# Patient Record
Sex: FEMALE | Race: WHITE | NOT HISPANIC OR LATINO | URBAN - METROPOLITAN AREA
[De-identification: names, ages, dates, MRNs, and addresses within clinical notes are randomized per-mention and may not be internally consistent; named-entity substitution may affect disease eponyms.]

---

## 2017-12-10 ENCOUNTER — EMERGENCY (EMERGENCY)
Facility: HOSPITAL | Age: 72
LOS: 1 days | Discharge: HOME | End: 2017-12-10
Attending: EMERGENCY MEDICINE | Admitting: EMERGENCY MEDICINE
Payer: MEDICARE

## 2017-12-10 VITALS
DIASTOLIC BLOOD PRESSURE: 51 MMHG | TEMPERATURE: 99 F | OXYGEN SATURATION: 100 % | RESPIRATION RATE: 14 BRPM | HEART RATE: 55 BPM | SYSTOLIC BLOOD PRESSURE: 109 MMHG

## 2017-12-10 VITALS
OXYGEN SATURATION: 100 % | RESPIRATION RATE: 16 BRPM | TEMPERATURE: 98 F | HEART RATE: 70 BPM | DIASTOLIC BLOOD PRESSURE: 77 MMHG | SYSTOLIC BLOOD PRESSURE: 128 MMHG

## 2017-12-10 LAB
HCT VFR BLD CALC: 36.4 % — SIGNIFICANT CHANGE UP (ref 34.5–45)
HGB BLD-MCNC: 12.3 G/DL — SIGNIFICANT CHANGE UP (ref 11.5–15.5)
MCHC RBC-ENTMCNC: 29.9 PG — SIGNIFICANT CHANGE UP (ref 27–34)
MCHC RBC-ENTMCNC: 33.8 % — SIGNIFICANT CHANGE UP (ref 32–36)
MCV RBC AUTO: 88.3 FL — SIGNIFICANT CHANGE UP (ref 80–100)
NRBC # FLD: 0 — SIGNIFICANT CHANGE UP
PLATELET # BLD AUTO: 166 K/UL — SIGNIFICANT CHANGE UP (ref 150–400)
PMV BLD: 11.3 FL — SIGNIFICANT CHANGE UP (ref 7–13)
RBC # BLD: 4.12 M/UL — SIGNIFICANT CHANGE UP (ref 3.8–5.2)
RBC # FLD: 14 % — SIGNIFICANT CHANGE UP (ref 10.3–14.5)
WBC # BLD: 8.5 K/UL — SIGNIFICANT CHANGE UP (ref 3.8–10.5)
WBC # FLD AUTO: 8.5 K/UL — SIGNIFICANT CHANGE UP (ref 3.8–10.5)

## 2017-12-10 PROCEDURE — 12002 RPR S/N/AX/GEN/TRNK2.6-7.5CM: CPT

## 2017-12-10 PROCEDURE — 70450 CT HEAD/BRAIN W/O DYE: CPT | Mod: 26

## 2017-12-10 PROCEDURE — 99284 EMERGENCY DEPT VISIT MOD MDM: CPT | Mod: 25,GC

## 2017-12-10 RX ORDER — ACETAMINOPHEN 500 MG
650 TABLET ORAL ONCE
Qty: 0 | Refills: 0 | Status: COMPLETED | OUTPATIENT
Start: 2017-12-10 | End: 2017-12-10

## 2017-12-10 RX ORDER — TETANUS TOXOID, REDUCED DIPHTHERIA TOXOID AND ACELLULAR PERTUSSIS VACCINE, ADSORBED 5; 2.5; 8; 8; 2.5 [IU]/.5ML; [IU]/.5ML; UG/.5ML; UG/.5ML; UG/.5ML
0.5 SUSPENSION INTRAMUSCULAR ONCE
Qty: 0 | Refills: 0 | Status: COMPLETED | OUTPATIENT
Start: 2017-12-10 | End: 2017-12-10

## 2017-12-10 RX ADMIN — TETANUS TOXOID, REDUCED DIPHTHERIA TOXOID AND ACELLULAR PERTUSSIS VACCINE, ADSORBED 0.5 MILLILITER(S): 5; 2.5; 8; 8; 2.5 SUSPENSION INTRAMUSCULAR at 05:34

## 2017-12-10 RX ADMIN — Medication 650 MILLIGRAM(S): at 04:45

## 2017-12-10 NOTE — ED PROVIDER NOTE - OBJECTIVE STATEMENT
71 YOF pmh HTN presents to ED s/p head injury pt woke up after her house alarm went off 71 YOF pmh HTN presents to ED s/p head injury pt woke up after her house alarm went off pt jumped out of bed and hit her head on the side table causing a laceration, no LOC, no headache, no vision changes. Pt is on aspirin daily 81mg. Pt states she continued to bleed after the laceration was able to sleep but woke up in a puddle of blood that soaked through her clothes. Pt applied pressure to head bleeding did not stop. Pt denies any lightheadedness or SOB. Pt denies any confusion.

## 2017-12-10 NOTE — ED PROVIDER NOTE - PMH
Acid Reflux    Dyslipidemia    HTN (Hypertension)    Lumbar Spinal Stenosis    Obesity    Ovarian Cyst

## 2017-12-10 NOTE — ED PROVIDER NOTE - PROGRESS NOTE DETAILS
Laceration repaired, no longer bleeding. Pt feels better, denies any headache. CTH is negative, Hgb normal will d/c home.

## 2017-12-10 NOTE — ED ADULT TRIAGE NOTE - CHIEF COMPLAINT QUOTE
pt got out of bed and fell, hit her head on the night stand around 1am. pt has a laceration to the left side of her head. dried blood noted, no active bleeding at this time. pt states she got startled when the alarm went off. (-)LOC. denies any pain, states "its throbbing". pt on takes 1 baby aspirin daily. PMH- HTN, HLD, GERD. pt got out of bed and fell, hit her head on the night stand around 1am. pt has a laceration to the left side of her head. pt states wound was continuously bleeding. dried blood noted, no active bleeding at this time. pt states she got startled when the alarm went off. (-)LOC. denies any pain, states "its throbbing". pt on takes 1 baby aspirin daily. PMH- HTN, HLD, GERD.

## 2017-12-10 NOTE — ED PROVIDER NOTE - MEDICAL DECISION MAKING DETAILS
Scalp laceration with head injury. Pt on aspirin, will get CTH for ICH. Cbc to check hemoglobin due to about of blood described by patient. Pt is not symptomatic low concern for massive hemorrhage. Will repair laceration, no hematoma under the bleed, laceration was superficial linear well alined.

## 2017-12-10 NOTE — ED ADULT NURSE NOTE - OBJECTIVE STATEMENT
Received on stretcher in 26 with  at bedside. Awake, A&Ox3, ambulatory without assistance, presents with blood in hair and open lac on left side of head approx. 3 cm in length, respirations even and unlabored on room air. 72 YO female h/o HTN c/o fall. States she awoke in the middle of night to home alarm sounding, fell into table and hit her head. Denies dizziness/lightheadedness before fall, LOC, HA, changes in vision. Lac is not actively bleeding. Comfort and safety measures provided. Call bell within reach.

## 2017-12-10 NOTE — ED ADULT NURSE NOTE - CHIEF COMPLAINT QUOTE
pt got out of bed and fell, hit her head on the night stand around 1am. pt has a laceration to the left side of her head. pt states wound was continuously bleeding. dried blood noted, no active bleeding at this time. pt states she got startled when the alarm went off. (-)LOC. denies any pain, states "its throbbing". pt on takes 1 baby aspirin daily. PMH- HTN, HLD, GERD.

## 2017-12-10 NOTE — ED PROVIDER NOTE - NS ED ROS FT
CONSTITUTIONAL: No fevers, no chills  Eyes: no visual changes  Ears: no ear drainage, no ear pain  Nose: no nasal congestion  Mouth/Throat: no sore throat  Cardiovascular: No Chest pain  Respiratory: No SOB  Gastrointestinal: No n/v/d, no abd pain  Genitourinary: no dysuria, no hematuria  SKIN: no rashes, +laceration to scalp.   NEURO: no headache  PSYCHIATRIC: no known mental health issues.

## 2018-01-19 ENCOUNTER — APPOINTMENT (OUTPATIENT)
Dept: ORTHOPEDIC SURGERY | Facility: CLINIC | Age: 73
End: 2018-01-19

## 2018-02-16 ENCOUNTER — APPOINTMENT (OUTPATIENT)
Dept: ORTHOPEDIC SURGERY | Facility: CLINIC | Age: 73
End: 2018-02-16
Payer: MEDICARE

## 2018-02-16 DIAGNOSIS — M48.061 SPINAL STENOSIS, LUMBAR REGION WITHOUT NEUROGENIC CLAUDICATION: ICD-10-CM

## 2018-02-16 PROCEDURE — 72170 X-RAY EXAM OF PELVIS: CPT | Mod: 59

## 2018-02-16 PROCEDURE — 72110 X-RAY EXAM L-2 SPINE 4/>VWS: CPT

## 2018-02-16 PROCEDURE — 99204 OFFICE O/P NEW MOD 45 MIN: CPT

## 2018-02-16 NOTE — PHYSICAL EXAM
[Poor Appearance] : well-appearing [Acute Distress] : not in acute distress [Obese] : not obese [Abl Mood] : in a normal mood [Abl Affect] : with normal affect [Poor Coordination] : normal coordination [Disorientation] : oriented x 3 [SLR] : negative straight leg raise [Plantar Reflex Right Only] : absent on the right [Plantar Reflex Left Only] : absent on the left [DTR Reflexes Clonus Of Right Ankle (___ Beats)] : absent on the right [DTR Reflexes Clonus Of Left Ankle (___ Beats)] : absent on the left [FreeTextEntry2] : The pt is awake, alert and oriented to self, place and time, is comfortable and in no acute distress. Inspection of neck, back and lower extremities bilaterally reveals no rashes or ecchymotic lesions.  There is no obvious abnormal spinal curvature in the sagittal and coronal planes. There is no tenderness over the cervical, thoracic or lumbar spine, or the paraspinal or upper and lower extremities musculature. There is no sacroiliac tenderness. No greater trochanteric tenderness bilaterally. No atrophy or abnormal movements noted in the upper or lower extremities. There is no swelling noted in the upper or lower extremities bilaterally. No cervical lymphadenopathy noted anteriorly. No joint laxity noted in the upper and lower extremity joints bilaterally.\par  Hip range of motion is degrees internal rotation 30° external rotation without pain. Full range of motion of the shoulders bilaterally with no significant pain\par Negative straight leg raise to 45° in the sitting position bilaterally. There is no groin pain with hip internal rotation and a negative RJ test bilaterally.  [de-identified] : ext 30 degrees with pain, flexion to her mid tibia with less pain [de-identified] : seen with her  [de-identified] : AP pelvis demonstrates mild pubic symphysis degeneration. No obvious acute fractures. Mild sacroiliac degeneration right more than left.\par \par 4 views lumbar spine demonstrate 16° right-sided scoliosis from L1-L4. Asymmetric degeneration along the concavity 4 and 45. On the lateral projection severe degeneration seen at L4-5 L5-S1 L3-4 and L2-3. Diffuse osteopenia noted. No obvious acute fractures. Superior endplate  compression is noted at L2 suspicious of a acute compression fracture. No dynamic instability between flexion and extension

## 2018-02-16 NOTE — DISCUSSION/SUMMARY
[Medication Risks Reviewed] : Medication risks reviewed [de-identified] : The patient has significant osteopenia on x-rays today. I recommended that she proceed with evaluation of osteoporosis and a referral to endocrinologist was provided for evaluation and management of osteoporosis if any. Also recommended that we obtain an MRI lumbar spine to better assess the likely acute compression deformity of L2 vertebral body. I also recommended use of a lumbar corset which was prescribed for her today. I will see her back after the MRI has been performed to discuss further treatment options. Prescribe her tramadol for symptomatic relief. Recommended use of Tylenol as well\par \par [place]  (Presript[place] Monitoring Program) registry reviewed for this patient

## 2018-02-16 NOTE — CONSULT LETTER
[Dear  ___] : Dear  [unfilled], [I had the pleasure of evaluating your patient, [unfilled].] : I had the pleasure of evaluating your patient, [unfilled]. [FreeTextEntry2] : Kavin Meli [FreeTextEntry1] : Thank you for this referral. I have enclosed my note for your review. Please feel free to contact my office if you have additional questions regarding this patient.\par \par Regards,\par Erich Campoverde MD, FACS, FAAOS\par \par  of Orthopaedic Surgery\par [employer] [facility] [employer]\par Spinal Reconstruction Surgery\par Minimally Invasive Spinal Surgery\par [facility]

## 2018-02-16 NOTE — HISTORY OF PRESENT ILLNESS
[Pain] : pain [Worsening] : worsening [___ wks] : [unfilled] week(s) ago [6] : currently ~his/her~ pain is 6 out of 10 [Bending] : worsened by bending [Lifting] : worsened by lifting [Prolonged Sitting] : worsened by prolonged sitting [Walking] : worsened by walking [NSAIDs] : relieved by nonsteroidal anti-inflammatory drugs [All Other ROS Normal] : All other review of systems are negative except as noted [All Hx] : past medical, family, and social [All] : medication and allergy [FreeTextEntry1] : Severe low back pain and right buttock pain for a week [FreeTextEntry2] : The patient today presents for evaluation of severe low back and right buttock pain for a week. She was moving a sewing machine downstairs weeks ago when she started noticing severe low back pain into right buttock. [de-identified] : laying down

## 2018-02-17 ENCOUNTER — OUTPATIENT (OUTPATIENT)
Dept: OUTPATIENT SERVICES | Facility: HOSPITAL | Age: 73
LOS: 1 days | End: 2018-02-17
Payer: MEDICARE

## 2018-02-17 ENCOUNTER — APPOINTMENT (OUTPATIENT)
Dept: MRI IMAGING | Facility: CLINIC | Age: 73
End: 2018-02-17
Payer: MEDICARE

## 2018-02-17 DIAGNOSIS — M80.00XA AGE-RELATED OSTEOPOROSIS WITH CURRENT PATHOLOGICAL FRACTURE, UNSPECIFIED SITE, INITIAL ENCOUNTER FOR FRACTURE: ICD-10-CM

## 2018-02-17 PROCEDURE — 72148 MRI LUMBAR SPINE W/O DYE: CPT

## 2018-02-17 PROCEDURE — 72148 MRI LUMBAR SPINE W/O DYE: CPT | Mod: 26

## 2018-03-05 ENCOUNTER — APPOINTMENT (OUTPATIENT)
Dept: ORTHOPEDIC SURGERY | Facility: CLINIC | Age: 73
End: 2018-03-05
Payer: MEDICARE

## 2018-03-05 VITALS
BODY MASS INDEX: 25.4 KG/M2 | SYSTOLIC BLOOD PRESSURE: 128 MMHG | HEIGHT: 58 IN | HEART RATE: 70 BPM | WEIGHT: 121 LBS | DIASTOLIC BLOOD PRESSURE: 77 MMHG

## 2018-03-05 PROCEDURE — 72100 X-RAY EXAM L-S SPINE 2/3 VWS: CPT

## 2018-03-05 PROCEDURE — 99214 OFFICE O/P EST MOD 30 MIN: CPT

## 2018-04-18 ENCOUNTER — APPOINTMENT (OUTPATIENT)
Dept: ORTHOPEDIC SURGERY | Facility: CLINIC | Age: 73
End: 2018-04-18
Payer: MEDICARE

## 2018-04-18 VITALS
BODY MASS INDEX: 25.4 KG/M2 | HEIGHT: 58 IN | DIASTOLIC BLOOD PRESSURE: 78 MMHG | WEIGHT: 121 LBS | HEART RATE: 73 BPM | SYSTOLIC BLOOD PRESSURE: 123 MMHG

## 2018-04-18 DIAGNOSIS — M84.48XA PATHOLOGICAL FRACTURE, OTHER SITE, INITIAL ENCOUNTER FOR FRACTURE: ICD-10-CM

## 2018-04-18 PROCEDURE — 72100 X-RAY EXAM L-S SPINE 2/3 VWS: CPT

## 2018-04-18 PROCEDURE — 99214 OFFICE O/P EST MOD 30 MIN: CPT

## 2018-05-23 ENCOUNTER — APPOINTMENT (OUTPATIENT)
Dept: ORTHOPEDIC SURGERY | Facility: CLINIC | Age: 73
End: 2018-05-23
Payer: MEDICARE

## 2018-05-23 VITALS
DIASTOLIC BLOOD PRESSURE: 76 MMHG | BODY MASS INDEX: 25.4 KG/M2 | WEIGHT: 121 LBS | SYSTOLIC BLOOD PRESSURE: 125 MMHG | HEART RATE: 67 BPM | HEIGHT: 58 IN

## 2018-05-23 PROCEDURE — 72100 X-RAY EXAM L-S SPINE 2/3 VWS: CPT

## 2018-05-23 PROCEDURE — 99214 OFFICE O/P EST MOD 30 MIN: CPT

## 2018-05-24 ENCOUNTER — APPOINTMENT (OUTPATIENT)
Dept: OTOLARYNGOLOGY | Facility: CLINIC | Age: 73
End: 2018-05-24
Payer: MEDICARE

## 2018-05-24 VITALS
WEIGHT: 121 LBS | HEART RATE: 62 BPM | DIASTOLIC BLOOD PRESSURE: 63 MMHG | BODY MASS INDEX: 25.4 KG/M2 | HEIGHT: 58 IN | SYSTOLIC BLOOD PRESSURE: 107 MMHG

## 2018-05-24 DIAGNOSIS — J34.89 OTHER SPECIFIED DISORDERS OF NOSE AND NASAL SINUSES: ICD-10-CM

## 2018-05-24 DIAGNOSIS — R09.81 NASAL CONGESTION: ICD-10-CM

## 2018-05-24 PROCEDURE — 99204 OFFICE O/P NEW MOD 45 MIN: CPT | Mod: 25

## 2018-05-24 PROCEDURE — 31231 NASAL ENDOSCOPY DX: CPT

## 2018-05-25 ENCOUNTER — RX RENEWAL (OUTPATIENT)
Age: 73
End: 2018-05-25

## 2018-06-10 ENCOUNTER — TRANSCRIPTION ENCOUNTER (OUTPATIENT)
Age: 73
End: 2018-06-10

## 2018-07-03 ENCOUNTER — RECORD ABSTRACTING (OUTPATIENT)
Age: 73
End: 2018-07-03

## 2018-07-03 DIAGNOSIS — J84.112 IDIOPATHIC PULMONARY FIBROSIS: ICD-10-CM

## 2018-07-03 DIAGNOSIS — I73.00 RAYNAUD'S SYNDROME W/OUT GANGRENE: ICD-10-CM

## 2018-07-07 ENCOUNTER — RECORD ABSTRACTING (OUTPATIENT)
Age: 73
End: 2018-07-07

## 2018-07-23 ENCOUNTER — APPOINTMENT (OUTPATIENT)
Dept: PULMONOLOGY | Facility: CLINIC | Age: 73
End: 2018-07-23
Payer: MEDICARE

## 2018-07-23 VITALS
OXYGEN SATURATION: 100 % | HEIGHT: 58 IN | SYSTOLIC BLOOD PRESSURE: 95 MMHG | DIASTOLIC BLOOD PRESSURE: 61 MMHG | WEIGHT: 121 LBS | HEART RATE: 70 BPM | BODY MASS INDEX: 25.4 KG/M2 | TEMPERATURE: 98.5 F | RESPIRATION RATE: 14 BRPM

## 2018-07-23 DIAGNOSIS — Z87.09 PERSONAL HISTORY OF OTHER DISEASES OF THE RESPIRATORY SYSTEM: ICD-10-CM

## 2018-07-23 PROCEDURE — 94060 EVALUATION OF WHEEZING: CPT

## 2018-07-23 PROCEDURE — 99213 OFFICE O/P EST LOW 20 MIN: CPT | Mod: 25

## 2018-07-23 PROCEDURE — 95012 NITRIC OXIDE EXP GAS DETER: CPT

## 2018-07-23 RX ORDER — MUPIROCIN 20 MG/G
2 OINTMENT TOPICAL TWICE DAILY
Qty: 1 | Refills: 5 | Status: DISCONTINUED | COMMUNITY
Start: 2018-05-25 | End: 2018-07-23

## 2018-07-23 RX ORDER — METHYLPREDNISOLONE 4 MG/1
4 TABLET ORAL
Qty: 21 | Refills: 0 | Status: DISCONTINUED | COMMUNITY
Start: 2017-10-05 | End: 2018-07-23

## 2018-07-23 RX ORDER — MOXIFLOXACIN HYDROCHLORIDE TABLETS, 400 MG 400 MG/1
400 TABLET, FILM COATED ORAL
Qty: 7 | Refills: 0 | Status: DISCONTINUED | COMMUNITY
Start: 2017-10-05 | End: 2018-07-23

## 2018-07-23 RX ORDER — DENOSUMAB 60 MG/ML
60 INJECTION SUBCUTANEOUS
Refills: 0 | Status: ACTIVE | COMMUNITY
Start: 2018-07-23

## 2018-07-23 RX ORDER — FLUTICASONE PROPIONATE 50 UG/1
50 SPRAY, METERED NASAL
Qty: 16 | Refills: 0 | Status: DISCONTINUED | COMMUNITY
Start: 2017-09-18 | End: 2018-07-23

## 2018-07-23 RX ORDER — TRAMADOL HYDROCHLORIDE 50 MG/1
50 TABLET, COATED ORAL
Qty: 50 | Refills: 0 | Status: DISCONTINUED | COMMUNITY
Start: 2018-02-16 | End: 2018-07-23

## 2018-07-23 RX ORDER — PROMETHAZINEHYDROCHLORIDE AND PHENYLEPHRINE HYDROCHLORIDE 6.25; 5 MG/5ML; MG/5ML
6.25-5 SYRUP ORAL
Qty: 300 | Refills: 0 | Status: DISCONTINUED | COMMUNITY
Start: 2017-08-29 | End: 2018-07-23

## 2018-10-29 ENCOUNTER — APPOINTMENT (OUTPATIENT)
Dept: PULMONOLOGY | Facility: CLINIC | Age: 73
End: 2018-10-29
Payer: MEDICARE

## 2018-10-29 VITALS
RESPIRATION RATE: 16 BRPM | BODY MASS INDEX: 25.61 KG/M2 | WEIGHT: 122 LBS | TEMPERATURE: 97.7 F | DIASTOLIC BLOOD PRESSURE: 74 MMHG | SYSTOLIC BLOOD PRESSURE: 128 MMHG | HEART RATE: 63 BPM | OXYGEN SATURATION: 100 % | HEIGHT: 58 IN

## 2018-10-29 DIAGNOSIS — M80.00XA AGE-RELATED OSTEOPOROSIS WITH CURRENT PATHOLOGICAL FRACTURE, UNSPECIFIED SITE, INITIAL ENCOUNTER FOR FRACTURE: ICD-10-CM

## 2018-10-29 PROCEDURE — 99213 OFFICE O/P EST LOW 20 MIN: CPT | Mod: 25

## 2018-10-29 PROCEDURE — 94060 EVALUATION OF WHEEZING: CPT

## 2018-10-29 RX ORDER — MOMETASONE 50 UG/1
50 SPRAY, METERED NASAL DAILY
Qty: 1 | Refills: 0 | Status: DISCONTINUED | COMMUNITY
Start: 2018-07-23 | End: 2018-10-29

## 2018-10-29 RX ORDER — PROMETHAZINEHYDROCHLORIDE AND PHENYLEPHRINE HYDROCHLORIDE 6.25; 5 MG/5ML; MG/5ML
6.25-5 SYRUP ORAL
Qty: 300 | Refills: 0 | Status: DISCONTINUED | COMMUNITY
Start: 2018-07-23 | End: 2018-10-29

## 2018-10-29 RX ORDER — BUDESONIDE AND FORMOTEROL FUMARATE DIHYDRATE 160; 4.5 UG/1; UG/1
160-4.5 AEROSOL RESPIRATORY (INHALATION)
Qty: 10 | Refills: 0 | Status: DISCONTINUED | COMMUNITY
Start: 2018-01-22 | End: 2018-10-29

## 2019-05-17 ENCOUNTER — APPOINTMENT (OUTPATIENT)
Dept: PULMONOLOGY | Facility: CLINIC | Age: 74
End: 2019-05-17
Payer: MEDICARE

## 2019-05-17 VITALS — DIASTOLIC BLOOD PRESSURE: 73 MMHG | OXYGEN SATURATION: 100 % | SYSTOLIC BLOOD PRESSURE: 120 MMHG | HEART RATE: 71 BPM

## 2019-05-17 PROCEDURE — 95012 NITRIC OXIDE EXP GAS DETER: CPT

## 2019-05-17 PROCEDURE — 94060 EVALUATION OF WHEEZING: CPT

## 2019-05-17 PROCEDURE — 99213 OFFICE O/P EST LOW 20 MIN: CPT | Mod: 25

## 2019-05-17 NOTE — PHYSICAL EXAM
[General Appearance - Well Developed] : well developed [Well Groomed] : well groomed [Normal Appearance] : normal appearance [General Appearance - In No Acute Distress] : no acute distress [General Appearance - Well Nourished] : well nourished [No Deformities] : no deformities [Normal Conjunctiva] : the conjunctiva exhibited no abnormalities [Eyelids - No Xanthelasma] : the eyelids demonstrated no xanthelasmas [Normal Oropharynx] : normal oropharynx [Neck Appearance] : the appearance of the neck was normal [Neck Cervical Mass (___cm)] : no neck mass was observed [Jugular Venous Distention Increased] : there was no jugular-venous distention [Thyroid Diffuse Enlargement] : the thyroid was not enlarged [Thyroid Nodule] : there were no palpable thyroid nodules [Heart Rate And Rhythm] : heart rate and rhythm were normal [Heart Sounds] : normal S1 and S2 [Murmurs] : no murmurs present [Respiration, Rhythm And Depth] : normal respiratory rhythm and effort [Exaggerated Use Of Accessory Muscles For Inspiration] : no accessory muscle use [Auscultation Breath Sounds / Voice Sounds] : lungs were clear to auscultation bilaterally [Abdomen Soft] : soft [Abdomen Tenderness] : non-tender [Abdomen Mass (___ Cm)] : no abdominal mass palpated [Abnormal Walk] : normal gait [Gait - Sufficient For Exercise Testing] : the gait was sufficient for exercise testing [Nail Clubbing] : no clubbing of the fingernails [Cyanosis, Localized] : no localized cyanosis [Petechial Hemorrhages (___cm)] : no petechial hemorrhages [Skin Color & Pigmentation] : normal skin color and pigmentation [] : no rash [No Venous Stasis] : no venous stasis [Skin Lesions] : no skin lesions [No Skin Ulcers] : no skin ulcer [No Xanthoma] : no  xanthoma was observed [Deep Tendon Reflexes (DTR)] : deep tendon reflexes were 2+ and symmetric [Sensation] : the sensory exam was normal to light touch and pinprick [No Focal Deficits] : no focal deficits [Oriented To Time, Place, And Person] : oriented to person, place, and time [Affect] : the affect was normal [Impaired Insight] : insight and judgment were intact

## 2019-05-18 NOTE — HISTORY OF PRESENT ILLNESS
[FreeTextEntry1] : Here for f/u.  Doing well. Using inhalers prn. No respiratory symptoms at this time. \par Slight URI\par

## 2019-07-03 ENCOUNTER — APPOINTMENT (OUTPATIENT)
Dept: ORTHOPEDIC SURGERY | Facility: CLINIC | Age: 74
End: 2019-07-03
Payer: MEDICARE

## 2019-07-03 PROCEDURE — 99214 OFFICE O/P EST MOD 30 MIN: CPT | Mod: 25

## 2019-07-03 PROCEDURE — 72170 X-RAY EXAM OF PELVIS: CPT | Mod: 59

## 2019-07-03 PROCEDURE — 72110 X-RAY EXAM L-2 SPINE 4/>VWS: CPT

## 2019-07-03 PROCEDURE — 96372 THER/PROPH/DIAG INJ SC/IM: CPT

## 2019-07-03 NOTE — HISTORY OF PRESENT ILLNESS
[Rest] : relieved by rest [Worsening] : worsening [8] : a current pain level of 8/10 [___ wks] : [unfilled] week(s) ago [Walking] : worsened by walking [de-identified] : Patient is here today for evaluation on her left low back buttock hip down left leg pain going on for the past 2 weeks no known injury and not medically treated for this issue. [de-identified] : sitting to standing to walking sleeping [de-identified] : sitting

## 2019-07-03 NOTE — DISCUSSION/SUMMARY
[Medication Risks Reviewed] : Medication risks reviewed [de-identified] : The patient has acute episode of left leg pain. Recommended a course of physical therapy for her. Prescribed her a course of oral steroids and gabapentin. For her pain today offered her an injection of Toradol and she wanted to proceed. Under sterile conditions 30 mg Toradol administered intramuscularly by the LPN without incident. Recommended she follow up with the gastroenterologist Dr. Hunt regarding the radiographic findings of metallic densities within her stool.\par \par The patient was educated regarding their condition, treatment options as well as prescribed course of treatment. \par Risks and benefits as well as alternatives to the proposed treatment were also provided to the patient \par They were given the opportunity to have all their questions answered to their satisfaction.\par \par Vital signs were reviewed with the patient and the patient was instructed to followup with their primary care provider for further management.\par \par Healthy lifestyle recommendations were also made including a tobacco free lifestyle, proper diet, and weight control.

## 2019-07-03 NOTE — PHYSICAL EXAM
[Stooped] : stooped [Painful] : is painful [Limited] : is limited [LE] : Sensory: Intact in bilateral lower extremities [0] : left patella 0 [1+] : left ankle jerk 1+ [DP] : dorsalis pedis 2+ and symmetric bilaterally [PT] : posterior tibial 2+ and symmetric bilaterally [Poor Appearance] : well-appearing [Acute Distress] : not in acute distress [Obese] : not obese [Poor Coordination] : normal coordination [Abl Mood] : in a normal mood [Abl Affect] : with normal affect [Disorientation] : oriented x 3 [Plantar Reflex Right Only] : absent on the right [SLR] : negative straight leg raise [Plantar Reflex Left Only] : absent on the left [DTR Reflexes Clonus Of Right Ankle (___ Beats)] : absent on the right [DTR Reflexes Clonus Of Left Ankle (___ Beats)] : absent on the left [FreeTextEntry2] : The pt is awake, alert and oriented to self, place and time, is comfortable and in no acute distress. Inspection of neck, back and lower extremities bilaterally reveals no rashes or ecchymotic lesions.  There is no obvious abnormal spinal curvature in the sagittal and coronal planes. There is no tenderness over the cervical, thoracic or lumbar spine, or the paraspinal or upper and lower extremities musculature. There is no sacroiliac tenderness. No greater trochanteric tenderness bilaterally. No atrophy or abnormal movements noted in the upper or lower extremities. There is no swelling noted in the upper or lower extremities bilaterally. No cervical lymphadenopathy noted anteriorly. No joint laxity noted in the upper and lower extremity joints bilaterally.\par  Hip range of motion is degrees internal rotation 30° external rotation without pain. Full range of motion of the shoulders bilaterally with no significant pain\par Negative straight leg raise to 45° in the sitting position bilaterally. There is no groin pain with hip internal rotation and a negative RJ test bilaterally.  [de-identified] : seen with her  [de-identified] : ext 30 degrees with pain, flexion to her mid tibia with less pain [de-identified] : AP pelvis demonstrates mild pubic symphysis degeneration. No obvious acute fractures. Mild sacroiliac degeneration right more than left.\par \par 4 views lumbar spine demonstrate 16° right-sided scoliosis from L1-L4. Asymmetric degeneration along the concavity 4 and 45. On the lateral projection severe degeneration seen at L4-5 L5-S1 L3-4 and L2-3. Diffuse osteopenia noted. No obvious acute fractures. Superior endplate  compression is noted at L2 suspicious of a acute compression fracture. No dynamic instability between flexion and extension.\par \par Interval appearance of metallic densities in right lower abdomen not seen previously. These are fairly small and appeared to be within the stool seen inside the colon

## 2019-07-03 NOTE — REASON FOR VISIT
[Back Pain] : back pain [Follow-Up Visit] : a follow-up visit for [Spouse] : spouse [Radiculopathy] : radiculopathy

## 2019-07-17 ENCOUNTER — APPOINTMENT (OUTPATIENT)
Dept: ORTHOPEDIC SURGERY | Facility: CLINIC | Age: 74
End: 2019-07-17
Payer: MEDICARE

## 2019-07-17 VITALS
HEIGHT: 58 IN | SYSTOLIC BLOOD PRESSURE: 110 MMHG | WEIGHT: 121 LBS | BODY MASS INDEX: 25.4 KG/M2 | HEART RATE: 65 BPM | DIASTOLIC BLOOD PRESSURE: 75 MMHG

## 2019-07-17 DIAGNOSIS — M80.00XS AGE-RELATED OSTEOPOROSIS WITH CURRENT PATHOLOGICAL FRACTURE, UNSPECIFIED SITE, SEQUELA: ICD-10-CM

## 2019-07-17 PROCEDURE — 99214 OFFICE O/P EST MOD 30 MIN: CPT

## 2019-07-17 NOTE — PHYSICAL EXAM
[Stooped] : stooped [Limited] : is limited [Painful] : is painful [LE] : Sensory: Intact in bilateral lower extremities [0] : left patella 0 [1+] : left ankle jerk 1+ [DP] : dorsalis pedis 2+ and symmetric bilaterally [PT] : posterior tibial 2+ and symmetric bilaterally [Poor Appearance] : well-appearing [Acute Distress] : not in acute distress [Obese] : not obese [Abl Mood] : in a normal mood [Abl Affect] : with normal affect [Poor Coordination] : normal coordination [Disorientation] : oriented x 3 [Antalgic] : antalgic [SLR] : negative straight leg raise [Plantar Reflex Right Only] : absent on the right [Plantar Reflex Left Only] : absent on the left [DTR Reflexes Clonus Of Right Ankle (___ Beats)] : absent on the right [DTR Reflexes Clonus Of Left Ankle (___ Beats)] : absent on the left [FreeTextEntry2] : The pt is awake, alert and oriented to self, place and time, is comfortable and in no acute distress. Inspection of neck, back and lower extremities bilaterally reveals no rashes or ecchymotic lesions.  There is no obvious abnormal spinal curvature in the sagittal and coronal planes. There is no tenderness over the cervical, thoracic or lumbar spine, or the paraspinal or upper and lower extremities musculature. There is no sacroiliac tenderness. No greater trochanteric tenderness bilaterally. No atrophy or abnormal movements noted in the upper or lower extremities. There is no swelling noted in the upper or lower extremities bilaterally. No cervical lymphadenopathy noted anteriorly. No joint laxity noted in the upper and lower extremity joints bilaterally.\par  Hip range of motion is degrees internal rotation 30° external rotation without pain. Full range of motion of the shoulders bilaterally with no significant pain\par Negative straight leg raise to 45° in the sitting position bilaterally. There is no groin pain with hip internal rotation and a negative RJ test bilaterally.  [de-identified] : ext 30 degrees with pain, flexion to her mid tibia with less pain

## 2019-07-17 NOTE — HISTORY OF PRESENT ILLNESS
[Stable] : stable [2] : a current pain level of 2/10 [Daily] : ~He/She~ states the symptoms seem to be occuring daily [Walking] : worsened by walking [de-identified] : Patient is here today for re evaluation on her low back left buttock into left hip area. Patient took the Medrol dose pack which did initially took the acute pain away but still limping due to the left buttock pain.  Patient thinks that the gabapentin is keeping her up at night.\par Overall 40-50% better.  [de-identified] : lying down [de-identified] : water therapy

## 2019-07-17 NOTE — DISCUSSION/SUMMARY
[Medication Risks Reviewed] : Medication risks reviewed [de-identified] : Increased gabapentin to 200 mg qhs. Start diclofenac twice a day. Start PT, check MRI lumbar spine Because of continued left buttock pain and antalgic gait. She is symptomatic lumbar radiculopathy. Recommended the above treatment plans in followup in 2 weeks as previously scheduled. She understands that if her symptoms persist lumbar epidural steroid injections can be considered. I recommended use of a lumbar corset on an as-needed basis as well as use of a cane.\par \par The patient was educated regarding their condition, treatment options as well as prescribed course of treatment. \par Risks and benefits as well as alternatives to the proposed treatment were also provided to the patient \par They were given the opportunity to have all their questions answered to their satisfaction.\par \par Vital signs were reviewed with the patient and the patient was instructed to followup with their primary care provider for further management.\par \par Healthy lifestyle recommendations were also made including a tobacco free lifestyle, proper diet, and weight control.

## 2019-07-20 ENCOUNTER — APPOINTMENT (OUTPATIENT)
Dept: MRI IMAGING | Facility: IMAGING CENTER | Age: 74
End: 2019-07-20

## 2019-07-29 ENCOUNTER — OUTPATIENT (OUTPATIENT)
Dept: OUTPATIENT SERVICES | Facility: HOSPITAL | Age: 74
LOS: 1 days | End: 2019-07-29
Payer: MEDICARE

## 2019-07-29 ENCOUNTER — APPOINTMENT (OUTPATIENT)
Dept: MRI IMAGING | Facility: IMAGING CENTER | Age: 74
End: 2019-07-29
Payer: MEDICARE

## 2019-07-29 DIAGNOSIS — M54.16 RADICULOPATHY, LUMBAR REGION: ICD-10-CM

## 2019-07-29 DIAGNOSIS — M41.50 OTHER SECONDARY SCOLIOSIS, SITE UNSPECIFIED: ICD-10-CM

## 2019-07-29 DIAGNOSIS — M80.00XS AGE-RELATED OSTEOPOROSIS WITH CURRENT PATHOLOGICAL FRACTURE, UNSPECIFIED SITE, SEQUELA: ICD-10-CM

## 2019-07-29 PROCEDURE — 72148 MRI LUMBAR SPINE W/O DYE: CPT | Mod: 26

## 2019-07-29 PROCEDURE — 72148 MRI LUMBAR SPINE W/O DYE: CPT

## 2019-08-05 ENCOUNTER — APPOINTMENT (OUTPATIENT)
Dept: ORTHOPEDIC SURGERY | Facility: CLINIC | Age: 74
End: 2019-08-05
Payer: MEDICARE

## 2019-08-05 ENCOUNTER — RESULT REVIEW (OUTPATIENT)
Age: 74
End: 2019-08-05

## 2019-08-05 PROCEDURE — 99214 OFFICE O/P EST MOD 30 MIN: CPT

## 2019-08-05 PROCEDURE — 72100 X-RAY EXAM L-S SPINE 2/3 VWS: CPT

## 2019-09-23 ENCOUNTER — APPOINTMENT (OUTPATIENT)
Dept: ORTHOPEDIC SURGERY | Facility: CLINIC | Age: 74
End: 2019-09-23
Payer: MEDICARE

## 2019-09-23 VITALS — DIASTOLIC BLOOD PRESSURE: 78 MMHG | HEIGHT: 58 IN | HEART RATE: 69 BPM | SYSTOLIC BLOOD PRESSURE: 125 MMHG

## 2019-09-23 DIAGNOSIS — M43.10 SPONDYLOLISTHESIS, SITE UNSPECIFIED: ICD-10-CM

## 2019-09-23 DIAGNOSIS — M41.50 OTHER SECONDARY SCOLIOSIS, SITE UNSPECIFIED: ICD-10-CM

## 2019-09-23 DIAGNOSIS — M54.5 LOW BACK PAIN: ICD-10-CM

## 2019-09-23 DIAGNOSIS — M54.16 RADICULOPATHY, LUMBAR REGION: ICD-10-CM

## 2019-09-23 PROCEDURE — 99214 OFFICE O/P EST MOD 30 MIN: CPT

## 2019-09-23 NOTE — DISCUSSION/SUMMARY
[Medication Risks Reviewed] : Medication risks reviewed [de-identified] : The patient ambulates with an antalgic gait and does have knee osteoarthritis. At this time she will think about an epidural steroid injection and will contact the office if she would like to proceed. She has found gabapentin and baclofen methyl full her condition and reports chronic left leg is not resolved with gabapentin especially at night. She understands that a left-sided L2 and L3 transforaminal epidural injection may be considered given the progressive stenosis at the L1-2 level. She has moderate stenosis at L2-3 and L3-4 levels with severe stenosis L1 to and surgical decompression may also be an option the future. She understands that she may need to consider additional levels of injection based on her response to the L2 and L3 epidural steroid injection.She will call to schedule injection\par \par The patient was educated regarding their condition, treatment options as well as prescribed course of treatment. \par Risks and benefits as well as alternatives to the proposed treatment were also provided to the patient \par They were given the opportunity to have all their questions answered to their satisfaction.\par \par Vital signs were reviewed with the patient and the patient was instructed to followup with their primary care provider for further management.\par \par Healthy lifestyle recommendations were also made including a tobacco free lifestyle, proper diet, and weight control.

## 2019-09-23 NOTE — PHYSICAL EXAM
[Stooped] : stooped [Antalgic] : antalgic [Limited] : is limited [Painful] : is painful [LE] : Sensory: Intact in bilateral lower extremities [0] : left patella 0 [1+] : left ankle jerk 1+ [DP] : dorsalis pedis 2+ and symmetric bilaterally [PT] : posterior tibial 2+ and symmetric bilaterally [Poor Appearance] : well-appearing [Acute Distress] : not in acute distress [Obese] : not obese [Abl Mood] : in a normal mood [Abl Affect] : with normal affect [Poor Coordination] : normal coordination [Disorientation] : oriented x 3 [SLR] : negative straight leg raise [Plantar Reflex Right Only] : absent on the right [Plantar Reflex Left Only] : absent on the left [DTR Reflexes Clonus Of Left Ankle (___ Beats)] : absent on the left [DTR Reflexes Clonus Of Right Ankle (___ Beats)] : absent on the right [de-identified] : ext 30 degrees with pain, flexion to her mid tibia with less pain [FreeTextEntry2] : The pt is awake, alert and oriented to self, place and time, is comfortable and in no acute distress. Inspection of neck, back and lower extremities bilaterally reveals no rashes or ecchymotic lesions.  There is no obvious abnormal spinal curvature in the sagittal and coronal planes. There is no tenderness over the cervical, thoracic or lumbar spine, or the paraspinal or upper and lower extremities musculature. There is no sacroiliac tenderness. No greater trochanteric tenderness bilaterally. No atrophy or abnormal movements noted in the upper or lower extremities. There is no swelling noted in the upper or lower extremities bilaterally. No cervical lymphadenopathy noted anteriorly. No joint laxity noted in the upper and lower extremity joints bilaterally.\par  Hip range of motion is degrees internal rotation 30° external rotation without pain. Full range of motion of the shoulders bilaterally with no significant pain\par Negative straight leg raise to 45° in the sitting position bilaterally. There is no groin pain with hip internal rotation and a negative RJ test bilaterally.

## 2019-09-23 NOTE — REASON FOR VISIT
[Follow-Up Visit] : a follow-up visit for [Back Pain] : back pain [Spinal Stenosis] : spinal stenosis [Radiculopathy] : radiculopathy

## 2019-09-23 NOTE — HISTORY OF PRESENT ILLNESS
[Stable] : stable [Bending] : worsened by bending [6] : a current pain level of 6/10 [Lifting] : worsened by lifting [Prolonged Sitting] : worsened by prolonged sitting [Prolonged Standing] : worsened by prolonged standing [Walking] : worsened by walking [Rest] : relieved by rest [de-identified] : Patient is here today to discuss whether or not to get lumbar epidural injection. Patient states just came back from vacation and states left leg symptoms only at this time.\par Diclofenac and gabapentin prn.

## 2019-11-17 ENCOUNTER — FORM ENCOUNTER (OUTPATIENT)
Age: 74
End: 2019-11-17

## 2019-11-18 ENCOUNTER — APPOINTMENT (OUTPATIENT)
Dept: PULMONOLOGY | Facility: CLINIC | Age: 74
End: 2019-11-18
Payer: MEDICARE

## 2019-11-18 VITALS
WEIGHT: 121 LBS | HEIGHT: 58 IN | TEMPERATURE: 98.8 F | OXYGEN SATURATION: 98 % | HEART RATE: 76 BPM | SYSTOLIC BLOOD PRESSURE: 123 MMHG | DIASTOLIC BLOOD PRESSURE: 70 MMHG | BODY MASS INDEX: 25.4 KG/M2 | RESPIRATION RATE: 16 BRPM

## 2019-11-18 PROCEDURE — 99213 OFFICE O/P EST LOW 20 MIN: CPT | Mod: 25

## 2019-11-18 PROCEDURE — 94060 EVALUATION OF WHEEZING: CPT

## 2019-11-18 NOTE — PHYSICAL EXAM
[General Appearance - Well Developed] : well developed [Well Groomed] : well groomed [Normal Appearance] : normal appearance [General Appearance - Well Nourished] : well nourished [No Deformities] : no deformities [General Appearance - In No Acute Distress] : no acute distress [Eyelids - No Xanthelasma] : the eyelids demonstrated no xanthelasmas [Normal Conjunctiva] : the conjunctiva exhibited no abnormalities [Normal Oropharynx] : normal oropharynx [Neck Cervical Mass (___cm)] : no neck mass was observed [Neck Appearance] : the appearance of the neck was normal [Thyroid Diffuse Enlargement] : the thyroid was not enlarged [Jugular Venous Distention Increased] : there was no jugular-venous distention [Thyroid Nodule] : there were no palpable thyroid nodules [Heart Rate And Rhythm] : heart rate and rhythm were normal [Heart Sounds] : normal S1 and S2 [Murmurs] : no murmurs present [Respiration, Rhythm And Depth] : normal respiratory rhythm and effort [Exaggerated Use Of Accessory Muscles For Inspiration] : no accessory muscle use [Auscultation Breath Sounds / Voice Sounds] : lungs were clear to auscultation bilaterally [Abdomen Soft] : soft [Abdomen Tenderness] : non-tender [Abdomen Mass (___ Cm)] : no abdominal mass palpated [Abnormal Walk] : normal gait [Gait - Sufficient For Exercise Testing] : the gait was sufficient for exercise testing [Nail Clubbing] : no clubbing of the fingernails [Cyanosis, Localized] : no localized cyanosis [Petechial Hemorrhages (___cm)] : no petechial hemorrhages [Skin Color & Pigmentation] : normal skin color and pigmentation [] : no rash [No Venous Stasis] : no venous stasis [Skin Lesions] : no skin lesions [No Skin Ulcers] : no skin ulcer [No Xanthoma] : no  xanthoma was observed [Deep Tendon Reflexes (DTR)] : deep tendon reflexes were 2+ and symmetric [No Focal Deficits] : no focal deficits [Sensation] : the sensory exam was normal to light touch and pinprick [Oriented To Time, Place, And Person] : oriented to person, place, and time [Impaired Insight] : insight and judgment were intact [Affect] : the affect was normal

## 2019-11-18 NOTE — ASSESSMENT
[FreeTextEntry1] : Stable from asthma perspective\par Continue PRN  inhalers\par F/u CT re lung cancer in 2020

## 2019-11-28 ENCOUNTER — TRANSCRIPTION ENCOUNTER (OUTPATIENT)
Age: 74
End: 2019-11-28

## 2020-05-18 ENCOUNTER — APPOINTMENT (OUTPATIENT)
Dept: PULMONOLOGY | Facility: CLINIC | Age: 75
End: 2020-05-18
Payer: MEDICARE

## 2020-05-18 PROCEDURE — 99213 OFFICE O/P EST LOW 20 MIN: CPT | Mod: 95

## 2020-05-18 NOTE — HISTORY OF PRESENT ILLNESS
[Never] : never [TextBox_4] : Some increased SOB walking with mask\par abnormal CT in April\par no cough\par

## 2020-05-18 NOTE — REASON FOR VISIT
[Follow-Up] : a follow-up visit [Abnormal CXR/ Chest CT] : an abnormal CXR/ chest CT [Asthma] : asthma [Medical Office: ([facility])___] : at the medical office located in  [Home] : at home, [unfilled] , at the time of the visit. [Patient] : the patient [Self] : self

## 2020-06-29 ENCOUNTER — APPOINTMENT (OUTPATIENT)
Dept: MAMMOGRAPHY | Facility: IMAGING CENTER | Age: 75
End: 2020-06-29

## 2020-07-21 ENCOUNTER — APPOINTMENT (OUTPATIENT)
Dept: PULMONOLOGY | Facility: CLINIC | Age: 75
End: 2020-07-21
Payer: MEDICARE

## 2020-07-21 VITALS — TEMPERATURE: 98.1 F | RESPIRATION RATE: 16 BRPM | DIASTOLIC BLOOD PRESSURE: 71 MMHG | SYSTOLIC BLOOD PRESSURE: 109 MMHG

## 2020-07-21 VITALS — OXYGEN SATURATION: 97 % | HEART RATE: 78 BPM

## 2020-07-21 DIAGNOSIS — Z20.828 CONTACT WITH AND (SUSPECTED) EXPOSURE TO OTHER VIRAL COMMUNICABLE DISEASES: ICD-10-CM

## 2020-07-21 PROCEDURE — 99213 OFFICE O/P EST LOW 20 MIN: CPT | Mod: CS

## 2020-07-21 NOTE — PHYSICAL EXAM
[No Acute Distress] : no acute distress [Normal Oropharynx] : normal oropharynx [Normal Appearance] : normal appearance [No Neck Mass] : no neck mass [Normal Rate/Rhythm] : normal rate/rhythm [Normal S1, S2] : normal s1, s2 [No Murmurs] : no murmurs [Clear to Auscultation Bilaterally] : clear to auscultation bilaterally [No Resp Distress] : no resp distress [No Abnormalities] : no abnormalities [Benign] : benign [No Clubbing] : no clubbing [Normal Gait] : normal gait [No Cyanosis] : no cyanosis [No Edema] : no edema [FROM] : FROM [Normal Color/ Pigmentation] : normal color/ pigmentation [No Focal Deficits] : no focal deficits [Normal Affect] : normal affect [Oriented x3] : oriented x3

## 2020-07-21 NOTE — ASSESSMENT
[FreeTextEntry1] : Findings on follow-up chest CT appear nonmalignant in nature.  Previously noted 6 mm nodule in right upper lobe no longer evident.  Has findings on CT suggestive of mucous plugging recommend returning for PFTs.

## 2020-07-21 NOTE — HISTORY OF PRESENT ILLNESS
[Never] : never [TextBox_4] : Follow-up for lung cancer.  Went for interval follow-up chest CT.\par No interval respiratory complaints maintaining COVID precautions

## 2020-07-21 NOTE — REASON FOR VISIT
[Follow-Up] : a follow-up visit [Lung Cancer] : lung cancer [Abnormal CXR/ Chest CT] : an abnormal CXR/ chest CT [Asthma] : asthma

## 2020-07-30 ENCOUNTER — APPOINTMENT (OUTPATIENT)
Dept: DISASTER EMERGENCY | Facility: CLINIC | Age: 75
End: 2020-07-30

## 2020-07-30 LAB — SARS-COV-2 N GENE NPH QL NAA+PROBE: NOT DETECTED

## 2020-08-04 ENCOUNTER — APPOINTMENT (OUTPATIENT)
Dept: PULMONOLOGY | Facility: CLINIC | Age: 75
End: 2020-08-04
Payer: MEDICARE

## 2020-08-04 ENCOUNTER — NON-APPOINTMENT (OUTPATIENT)
Age: 75
End: 2020-08-04

## 2020-08-04 VITALS
DIASTOLIC BLOOD PRESSURE: 53 MMHG | BODY MASS INDEX: 27.08 KG/M2 | HEART RATE: 74 BPM | SYSTOLIC BLOOD PRESSURE: 87 MMHG | HEIGHT: 58 IN | WEIGHT: 129 LBS | OXYGEN SATURATION: 100 % | RESPIRATION RATE: 12 BRPM

## 2020-08-04 LAB
ALBUMIN SERPL ELPH-MCNC: 4.3 G/DL
ALP BLD-CCNC: 96 U/L
ALT SERPL-CCNC: 18 U/L
ANION GAP SERPL CALC-SCNC: 11 MMOL/L
APTT BLD: 33.6 SEC
AST SERPL-CCNC: 20 U/L
BASOPHILS # BLD AUTO: 0.04 K/UL
BASOPHILS NFR BLD AUTO: 0.5 %
BILIRUB SERPL-MCNC: 0.9 MG/DL
BUN SERPL-MCNC: 24 MG/DL
CALCIUM SERPL-MCNC: 9.2 MG/DL
CHLORIDE SERPL-SCNC: 104 MMOL/L
CO2 SERPL-SCNC: 26 MMOL/L
CREAT SERPL-MCNC: 0.74 MG/DL
EOSINOPHIL # BLD AUTO: 0.17 K/UL
EOSINOPHIL NFR BLD AUTO: 2.3 %
GLUCOSE SERPL-MCNC: 78 MG/DL
HCT VFR BLD CALC: 39.8 %
HGB BLD-MCNC: 12.8 G/DL
IMM GRANULOCYTES NFR BLD AUTO: 0.4 %
INR PPP: 1 RATIO
LYMPHOCYTES # BLD AUTO: 2.18 K/UL
LYMPHOCYTES NFR BLD AUTO: 29 %
MAN DIFF?: NORMAL
MCHC RBC-ENTMCNC: 29.2 PG
MCHC RBC-ENTMCNC: 32.2 GM/DL
MCV RBC AUTO: 90.9 FL
MONOCYTES # BLD AUTO: 0.58 K/UL
MONOCYTES NFR BLD AUTO: 7.7 %
NEUTROPHILS # BLD AUTO: 4.53 K/UL
NEUTROPHILS NFR BLD AUTO: 60.1 %
PLATELET # BLD AUTO: 168 K/UL
POTASSIUM SERPL-SCNC: 4.8 MMOL/L
PROT SERPL-MCNC: 6.8 G/DL
PT BLD: 11.8 SEC
RBC # BLD: 4.38 M/UL
RBC # FLD: 13.2 %
SODIUM SERPL-SCNC: 141 MMOL/L
WBC # FLD AUTO: 7.53 K/UL

## 2020-08-04 PROCEDURE — 99214 OFFICE O/P EST MOD 30 MIN: CPT | Mod: 25

## 2020-08-04 PROCEDURE — 94729 DIFFUSING CAPACITY: CPT

## 2020-08-04 PROCEDURE — 94060 EVALUATION OF WHEEZING: CPT

## 2020-08-04 PROCEDURE — 93000 ELECTROCARDIOGRAM COMPLETE: CPT

## 2020-08-04 PROCEDURE — 36415 COLL VENOUS BLD VENIPUNCTURE: CPT

## 2020-08-04 PROCEDURE — 94727 GAS DIL/WSHOT DETER LNG VOL: CPT

## 2020-08-05 NOTE — HISTORY OF PRESENT ILLNESS
[Never] : never [TextBox_4] : Follow-up for lung cancer. Went for interval follow-up chest CT.\par No interval respiratory complaints maintaining COVID precautions. \par

## 2020-08-05 NOTE — ASSESSMENT
[FreeTextEntry1] : History of lung cancer status post resection.  New finding in right lower lobe possible endobronchial lesion versus mucoid impaction.  MRI did not demonstrate any conclusive findings.  Will discuss with surgeon regarding observation versus bronchoscopy

## 2020-08-05 NOTE — PHYSICAL EXAM
[No Acute Distress] : no acute distress [Normal Oropharynx] : normal oropharynx [Normal Rate/Rhythm] : normal rate/rhythm [Normal S1, S2] : normal s1, s2 [Normal Appearance] : normal appearance [No Neck Mass] : no neck mass [No Murmurs] : no murmurs [No Resp Distress] : no resp distress [No Abnormalities] : no abnormalities [Clear to Auscultation Bilaterally] : clear to auscultation bilaterally [Benign] : benign [No Cyanosis] : no cyanosis [No Clubbing] : no clubbing [Normal Gait] : normal gait [FROM] : FROM [Normal Color/ Pigmentation] : normal color/ pigmentation [No Edema] : no edema [Normal Affect] : normal affect [Oriented x3] : oriented x3 [No Focal Deficits] : no focal deficits

## 2020-08-12 ENCOUNTER — APPOINTMENT (OUTPATIENT)
Dept: GASTROENTEROLOGY | Facility: CLINIC | Age: 75
End: 2020-08-12
Payer: MEDICARE

## 2020-08-12 VITALS
RESPIRATION RATE: 16 BRPM | BODY MASS INDEX: 28.34 KG/M2 | OXYGEN SATURATION: 98 % | TEMPERATURE: 96.6 F | SYSTOLIC BLOOD PRESSURE: 110 MMHG | DIASTOLIC BLOOD PRESSURE: 60 MMHG | WEIGHT: 135 LBS | HEART RATE: 70 BPM | HEIGHT: 58 IN

## 2020-08-12 PROCEDURE — 99204 OFFICE O/P NEW MOD 45 MIN: CPT

## 2020-08-12 NOTE — PHYSICAL EXAM
[General Appearance - Alert] : alert [General Appearance - In No Acute Distress] : in no acute distress [Sclera] : the sclera and conjunctiva were normal [PERRL With Normal Accommodation] : pupils were equal in size, round, and reactive to light [Both Tympanic Membranes Were Examined] : both tympanic membranes were normal [Outer Ear] : the ears and nose were normal in appearance [] : no respiratory distress [Respiration, Rhythm And Depth] : normal respiratory rhythm and effort [Apical Impulse] : the apical impulse was normal [Heart Rate And Rhythm] : heart rate was normal and rhythm regular [Bowel Sounds] : normal bowel sounds [Abdomen Soft] : soft [Abdomen Tenderness] : non-tender [Cervical Lymph Nodes Enlarged Posterior Bilaterally] : posterior cervical [Cervical Lymph Nodes Enlarged Anterior Bilaterally] : anterior cervical [Abnormal Walk] : normal gait [Skin Color & Pigmentation] : normal skin color and pigmentation [Musculoskeletal - Swelling] : no joint swelling seen

## 2020-08-12 NOTE — ASSESSMENT
[FreeTextEntry1] : 74 year old female with Johnson's and LGD. \par \par I discussed the proposed EGD with ablation therapy with the patient and daughter (Ela.  I discussed the risks (bleeding, infection, perforation, pancreatitis, and anesthesia risks), benefits, and alternatives  with the patient. The patient agrees to proceed. \par \par Thank you for involving me in their care.\par \par Fercho Castro MD\par Director of Endoscopy\par [facility]\par [facility]\par Phone: [phone]\par Fax [phone]\par

## 2020-08-12 NOTE — HISTORY OF PRESENT ILLNESS
[de-identified] : 74 year old female with a history of early lung cancer diagnosed years ago (2014)  s/p resection undergoing w/u for a recently fund small nodule. No heart problems. Patients diagnosed with Johnson's 2 years ago recently found to have BE with LGD. Patient referred for ablation therapy. \par \par Patient with GERD controlled with omeprazole 20 mg PO daily. \par \par PMH: lung cancer, GERD, high cholesterol\par PSH: lung surgery, knee surgery\par \par Allg: NKDA\par \par Family History: Father with CAD, mother with ovarian cancer. No esophageal cancer history.

## 2020-08-12 NOTE — CONSULT LETTER
[Courtesy Letter:] : I had the pleasure of seeing your patient, [unfilled], in my office today. [Dear  ___] : Dear  [unfilled], [Consult Closing:] : Thank you very much for allowing me to participate in the care of this patient.  If you have any questions, please do not hesitate to contact me. [Please see my note below.] : Please see my note below. [Sincerely,] : Sincerely, [FreeTextEntry3] : --\par Fercho Castro MD, DIANA\par Director of Endoscopy\par [facility]\par Associate Professor of Medicine\par [facility] [employer] [facility]\par  [FreeTextEntry2] : Deuce Hunt MD\par [address]\par [address] [place] [address]\par

## 2020-08-16 RX ORDER — ZOSTER VACCINE RECOMBINANT, ADJUVANTED 50 MCG/0.5
50 KIT INTRAMUSCULAR
Qty: 1 | Refills: 1 | Status: COMPLETED | COMMUNITY
Start: 2018-10-29 | End: 2020-08-16

## 2020-08-16 RX ORDER — AMLODIPINE BESYLATE AND BENAZEPRIL HYDROCHLORIDE 10; 20 MG/1; MG/1
10-20 CAPSULE ORAL
Refills: 0 | Status: COMPLETED | COMMUNITY
End: 2020-08-16

## 2020-08-16 RX ORDER — DICLOFENAC SODIUM 50 MG/1
50 TABLET, DELAYED RELEASE ORAL
Qty: 30 | Refills: 2 | Status: COMPLETED | COMMUNITY
Start: 2019-07-17 | End: 2020-08-16

## 2020-08-16 RX ORDER — GABAPENTIN 100 MG/1
100 CAPSULE ORAL
Qty: 30 | Refills: 2 | Status: COMPLETED | COMMUNITY
Start: 2019-07-03 | End: 2020-08-16

## 2020-08-16 RX ORDER — FLUTICASONE PROPIONATE 50 UG/1
50 SPRAY, METERED NASAL
Qty: 1 | Refills: 3 | Status: COMPLETED | COMMUNITY
Start: 2019-05-17 | End: 2020-08-16

## 2020-08-17 RX ORDER — OMEPRAZOLE 40 MG/1
40 CAPSULE, DELAYED RELEASE ORAL
Refills: 0 | Status: DISCONTINUED | COMMUNITY
End: 2020-08-17

## 2020-08-17 RX ORDER — OMEPRAZOLE 40 MG/1
40 CAPSULE, DELAYED RELEASE ORAL
Qty: 30 | Refills: 3 | Status: DISCONTINUED | COMMUNITY
Start: 2018-01-19 | End: 2020-08-17

## 2020-08-24 ENCOUNTER — APPOINTMENT (OUTPATIENT)
Dept: DISASTER EMERGENCY | Facility: CLINIC | Age: 75
End: 2020-08-24

## 2020-08-24 LAB — SARS-COV-2 N GENE NPH QL NAA+PROBE: NOT DETECTED

## 2020-08-27 ENCOUNTER — RESULT REVIEW (OUTPATIENT)
Age: 75
End: 2020-08-27

## 2020-08-27 ENCOUNTER — OUTPATIENT (OUTPATIENT)
Dept: OUTPATIENT SERVICES | Facility: HOSPITAL | Age: 75
LOS: 1 days | End: 2020-08-27
Payer: MEDICARE

## 2020-08-27 VITALS
WEIGHT: 130.07 LBS | DIASTOLIC BLOOD PRESSURE: 62 MMHG | HEIGHT: 60 IN | HEART RATE: 61 BPM | OXYGEN SATURATION: 99 % | SYSTOLIC BLOOD PRESSURE: 111 MMHG

## 2020-08-27 VITALS
HEART RATE: 57 BPM | SYSTOLIC BLOOD PRESSURE: 130 MMHG | RESPIRATION RATE: 14 BRPM | OXYGEN SATURATION: 100 % | DIASTOLIC BLOOD PRESSURE: 56 MMHG

## 2020-08-27 DIAGNOSIS — K91.89 OTHER POSTPROCEDURAL COMPLICATIONS AND DISORDERS OF DIGESTIVE SYSTEM: ICD-10-CM

## 2020-08-27 LAB
GRAM STN FLD: SIGNIFICANT CHANGE UP
NIGHT BLUE STAIN TISS: SIGNIFICANT CHANGE UP
SPECIMEN SOURCE: SIGNIFICANT CHANGE UP
SPECIMEN SOURCE: SIGNIFICANT CHANGE UP

## 2020-08-27 PROCEDURE — 87116 MYCOBACTERIA CULTURE: CPT

## 2020-08-27 PROCEDURE — 88305 TISSUE EXAM BY PATHOLOGIST: CPT | Mod: 26

## 2020-08-27 PROCEDURE — 87015 SPECIMEN INFECT AGNT CONCNTJ: CPT

## 2020-08-27 PROCEDURE — 31624 DX BRONCHOSCOPE/LAVAGE: CPT | Mod: 59

## 2020-08-27 PROCEDURE — 88305 TISSUE EXAM BY PATHOLOGIST: CPT

## 2020-08-27 PROCEDURE — 88112 CYTOPATH CELL ENHANCE TECH: CPT

## 2020-08-27 PROCEDURE — 88112 CYTOPATH CELL ENHANCE TECH: CPT | Mod: 26

## 2020-08-27 PROCEDURE — 87070 CULTURE OTHR SPECIMN AEROBIC: CPT

## 2020-08-27 PROCEDURE — 87102 FUNGUS ISOLATION CULTURE: CPT

## 2020-08-27 PROCEDURE — 87206 SMEAR FLUORESCENT/ACID STAI: CPT

## 2020-08-27 PROCEDURE — 31624 DX BRONCHOSCOPE/LAVAGE: CPT

## 2020-08-27 RX ORDER — SODIUM CHLORIDE 9 MG/ML
1000 INJECTION INTRAMUSCULAR; INTRAVENOUS; SUBCUTANEOUS
Refills: 0 | Status: DISCONTINUED | OUTPATIENT
Start: 2020-08-27 | End: 2020-09-11

## 2020-08-27 RX ORDER — DENOSUMAB 60 MG/ML
0 INJECTION SUBCUTANEOUS
Qty: 0 | Refills: 0 | DISCHARGE

## 2020-08-27 RX ADMIN — SODIUM CHLORIDE 30 MILLILITER(S): 9 INJECTION INTRAMUSCULAR; INTRAVENOUS; SUBCUTANEOUS at 08:21

## 2020-08-27 NOTE — ASU PREOP CHECKLIST - ALLERGY BAND ON
Radiology Pre-Procedure Note     Patient: Pretty Huber Date: 2018   : 1993 Attending: Anil Bryan MD   24 year old female      Chief Complaint:  Gtube malfunction      Pre-Op Diagnosis:  Gtube malfunction    Patient Active Problem List    Diagnosis Date Noted   • Stereotypic movement disorder, with self-injurious behavior, associated with known neurodevelopmental disorder, severe 2017     Priority: Low   • Severe intellectual disability 2017     Priority: Low   • Bronchomalacia, congenital      Priority: Low     with hypoxic encephalopathy     • Coarctation of aorta 2015     Priority: Low     repaired     • Von Willebrand disease (CMS/HCC) 2013     Priority: Low     Past Medical History:   Diagnosis Date   • Anxiety    • Bronchomalacia, congenital     with hypoxic encephalopathy   • Coarctation of aorta     repaired   • Deafness    • Developmental delay    • Gastric ulcer    • Von Willebrand disease (CMS/HCC)       Past Surgical History:   Procedure Laterality Date   • AORTA SURGERY  2006    repair of coarctation with placement of Dacron interposition graft   • ESOPHAGOGASTRODUODENOSCOPY  2016    with peg tube change   • EXPLORATORY OF ABDOMEN     • EYE SURGERY Right    • TYMPANOSTOMY TUBE PLACEMENT        Social History   Substance Use Topics   • Smoking status: Passive Smoke Exposure - Never Smoker   • Smokeless tobacco: Never Used   • Alcohol use No      Family History   Problem Relation Age of Onset   • Bleeding Disorder Brother    • Anxiety disorder Father      previously on fluoxetine   • Depression Father    • Heart disease Father         (Not in a hospital admission)  Current Outpatient Prescriptions   Medication   • risperiDONE (RISPERDAL) 1 MG/ML oral solution   • esomeprazole (NEXIUM) 40 MG capsule   • medroxyPROGESTERone (DEPO-PROVERA) 150 MG/ML injectable suspension   • FLUoxetine (PROZAC) 20 MG/5ML solution   • polyethylene glycol  (MIRALAX) powder   • labetalol (NORMODYNE) 100 MG tablet   • amLODIPine (NORVASC) 2.5 MG tablet   • ferrous sulfate 220 (44 Fe) MG/5ML liquid   • cholecalciferol (VITAMIN D3) 1000 UNITS tablet   • aminocaproic acid (AMICAR) 25 % solution   • Cholecalciferol 400 UNT/0.03ML Liquid   • loratadine (CLARITIN) 5 MG/5ML syrup   • guanFACINE (TENEX) 1 MG tablet   • chlorhexidine gluconate (PERIDEX) 0.12 % solution   • acetaminophen (TYLENOL) 500 MG tablet   • Oral Hygiene Products (TOOTHETTE SWABS UNTREATED) SWAB   • albuterol (VENTOLIN) (2.5 MG/3ML) 0.083% nebulizer solution     No current facility-administered medications for this encounter.      Current Medications Reviewed: Yes  ALLERGIES:   Allergen Reactions   • Augmentin [Amoclan] DIARRHEA     Severe     • Aspirin      Blood coagulation disorder with prolonged bleeding time.   • Nsaids      Blood coagulation disorder with prolonged bleeding time.      Current Allergies Reviewed:Yes    Review of Systems:  Unable to obtain due to clinical status    Pregnancy Status: not possible   Last menstrual period: No LMP recorded. Patient has had an injection.    Laboratory Results:  Lab Results   Component Value Date    SODIUM 145 08/09/2016    POTASSIUM 3.7 08/09/2016    BUN 6 (L) 08/09/2016    CREATININE 0.61 08/09/2016    WBC 4.1 (L) 07/07/2017    HCT 32.0 (L) 07/07/2017    HGB 9.6 (L) 07/07/2017     07/07/2017    INR 1.1 08/06/2016    BILIRUBIN 0.4 08/06/2016    GLUCOSE 96 08/09/2016       Lab Results Reviewed: Yes    PHYSICAL ASSESSMENT  Vital signs in last 24 hours:  Temp:  [98.3 °F (36.8 °C)] 98.3 °F (36.8 °C)  Resp:  [18] 18  BP: (118)/(62) 118/62    Cardiac:regular rate and rhythm  HEENT: unable to cooperate with assessment  Respiratory: unlabored, ORA, CTA  Neurological: alert, EM x4   Vascular:  warm and acyanotic    ASA score: ASA 4: Incapacitating disease that is a constant threat to life    History of Sedation: No complications    Sleep Apnea: No      Plan  for sedation was discussed with the patient: minimal sedation    Planned Procedure:  Gastrostomy tube exchange, possible placement    The procedure, risks, benefits, and alternatives were discussed with the patient's guardian, who gives consent to proceed: Yes    A/P (DPI):  Pretty Huber is a 24 year old developmentally delayed female with a gastrostomy tube malfunction, plan for a gastrostomy tube exchange, possible placement.        SABINA Harman                        Time: 3:58 PM     no known allergies

## 2020-08-27 NOTE — PRE PROCEDURE NOTE - PRE PROCEDURE EVALUATION
Attending Physician:                      Procedure: Bronchoscopy    Indication for Procedure: lung cancer f/u  ________________________________________________________  PAST MEDICAL & SURGICAL HISTORY:  Obesity  Ovarian Cyst  Acid Reflux  Lumbar Spinal Stenosis  Dyslipidemia  HTN (Hypertension)  Benign Cyst of Breast: left  S/P Arthroscopy of Right Knee    ALLERGIES:  No Known Allergies    HOME MEDICATIONS:  amlodipine-benazepril 5 mg-10 mg oral capsule: 1 cap(s) orally once a day  Aspirin Enteric Coated 81 mg oral delayed release tablet: 1 tab(s) orally once a day  Calcium 600+D oral tablet: 1 tab(s) orally 1 times a day  Lipitor 10 mg oral tablet: 1 tab(s) orally once a day  omeprazole 20 mg oral delayed release capsule: 1 cap(s) orally once a day  Prolia 60 mg/mL subcutaneous solution:   Vitamin B-12 1000 mcg oral tablet: 1 tab(s) orally once a day  Vitamin D3 1000 intl units (25 mcg) oral capsule:     AICD/PPM:   [ ] no    PERTINENT LAB DATA:    Reviewed-all normal                  PHYSICAL EXAMINATION:    Height (cm): 154.94  Weight (kg): 59  BMI (kg/m2): 24.6  BSA (m2): 1.57T(C): 36.5  HR: 61  BP: 111/62  RR: 14  SpO2: 100%    Constitutional: NAD  HEENT: PERRLA, EOMI,    Neck:  No JVD  Respiratory: CTAB/L  Cardiovascular: S1 and S2  Gastrointestinal: BS+, soft, NT/ND  Extremities: No peripheral edema  Neurological: A/O x 3, no focal deficits  Psychiatric: Normal mood, normal affect  Skin: No rashes    ASA Class: I [ ]  II 2  III [ ]  IV [ ]    COMMENTS:    The patient is a suitable candidate for the planned procedure unless box checked [ ]  No, explain:

## 2020-08-27 NOTE — PRE-ANESTHESIA EVALUATION ADULT - NSANTHOSAYNRD_GEN_A_CORE
No. NATALY screening performed.  STOP BANG Legend: 0-2 = LOW Risk; 3-4 = INTERMEDIATE Risk; 5-8 = HIGH Risk

## 2020-08-27 NOTE — PACU DISCHARGE NOTE - COMMENTS
upon discharge, pt fell on buttocks while changing back into street clothes. Pt. felt slightly dizzy at that time.  Vital signs were stable, pain is minimal, pt able to walk to bathroom unassisted. Pt denies numbness or weakness. I think imaging not indicated at this time given minimal sx's. Pt instructed to call Dr. Valdovinos's office or return to ER if pt develops numbness or weakness or if pain increases. Tylenol for pain.

## 2020-08-27 NOTE — ASU DISCHARGE PLAN (ADULT/PEDIATRIC) - CARE PROVIDER_API CALL
Jean Valdovinos  CRITICAL CARE MEDICINE  [address] [facility] [address] [facility] [address]  [place] [address]  Phone: [phone]  Fax: [phone]  Follow Up Time:

## 2020-08-28 LAB — NON-GYNECOLOGICAL CYTOLOGY STUDY: SIGNIFICANT CHANGE UP

## 2020-08-29 LAB
CULTURE RESULTS: SIGNIFICANT CHANGE UP
SPECIMEN SOURCE: SIGNIFICANT CHANGE UP

## 2020-09-25 ENCOUNTER — APPOINTMENT (OUTPATIENT)
Dept: PULMONOLOGY | Facility: CLINIC | Age: 75
End: 2020-09-25
Payer: MEDICARE

## 2020-09-25 ENCOUNTER — MED ADMIN CHARGE (OUTPATIENT)
Age: 75
End: 2020-09-25

## 2020-09-25 VITALS
DIASTOLIC BLOOD PRESSURE: 80 MMHG | OXYGEN SATURATION: 100 % | TEMPERATURE: 97.3 F | HEART RATE: 70 BPM | SYSTOLIC BLOOD PRESSURE: 115 MMHG

## 2020-09-25 DIAGNOSIS — Z23 ENCOUNTER FOR IMMUNIZATION: ICD-10-CM

## 2020-09-25 PROCEDURE — G0008: CPT

## 2020-09-25 PROCEDURE — 90662 IIV NO PRSV INCREASED AG IM: CPT

## 2020-09-25 PROCEDURE — 99213 OFFICE O/P EST LOW 20 MIN: CPT | Mod: 25

## 2020-09-26 LAB
CULTURE RESULTS: SIGNIFICANT CHANGE UP
SPECIMEN SOURCE: SIGNIFICANT CHANGE UP

## 2020-09-29 NOTE — HISTORY OF PRESENT ILLNESS
[Never] : never [TextBox_4] : Follow-up for lung cancer. Went for interval follow-up chest CT. and Bronchoscopy \par No interval respiratory complaints maintaining COVID precautions. \par

## 2020-09-29 NOTE — ASSESSMENT
[FreeTextEntry1] : History of lung cancer status post resection.  New finding in right lower lobe possible endobronchial lesion versus mucoid impaction.  MRI did not demonstrate any conclusive findings.  bronchoscopy neg for Malignant cells. \par f/u imaging per Thoracic surgery\par \par Appears to be in in stable condition for upcoming endoscopy from pulmonary perspective

## 2020-10-01 PROBLEM — Z23 ENCOUNTER FOR IMMUNIZATION: Status: ACTIVE | Noted: 2020-09-25

## 2020-10-14 DIAGNOSIS — Z01.818 ENCOUNTER FOR OTHER PREPROCEDURAL EXAMINATION: ICD-10-CM

## 2020-10-16 ENCOUNTER — APPOINTMENT (OUTPATIENT)
Dept: DISASTER EMERGENCY | Facility: CLINIC | Age: 75
End: 2020-10-16

## 2020-10-17 LAB
CULTURE RESULTS: SIGNIFICANT CHANGE UP
SARS-COV-2 N GENE NPH QL NAA+PROBE: NOT DETECTED
SPECIMEN SOURCE: SIGNIFICANT CHANGE UP

## 2020-10-18 RX ORDER — SODIUM CHLORIDE 9 MG/ML
1000 INJECTION INTRAMUSCULAR; INTRAVENOUS; SUBCUTANEOUS
Refills: 0 | Status: DISCONTINUED | OUTPATIENT
Start: 2020-10-19 | End: 2020-11-02

## 2020-10-18 NOTE — PRE PROCEDURE NOTE - PRE PROCEDURE EVALUATION
Attending Physician:    Gloria                        Procedure: EGD    Indication for Procedure: BE ablation  ________________________________________________________  PAST MEDICAL & SURGICAL HISTORY:  Obesity    Ovarian Cyst    Acid Reflux    Lumbar Spinal Stenosis    Dyslipidemia    HTN (Hypertension)    Benign Cyst of Breast  left    S/P Arthroscopy of Right Knee      ALLERGIES:  No Known Allergies    HOME MEDICATIONS:  amlodipine-benazepril 5 mg-10 mg oral capsule: 1 cap(s) orally once a day  Aspirin Enteric Coated 81 mg oral delayed release tablet: 1 tab(s) orally once a day  Calcium 600+D oral tablet: 1 tab(s) orally 1 times a day  Lipitor 10 mg oral tablet: 1 tab(s) orally once a day  omeprazole 20 mg oral delayed release capsule: 1 cap(s) orally once a day  Vitamin B-12 1000 mcg oral tablet: 1 tab(s) orally once a day  Vitamin D3 1000 intl units (25 mcg) oral capsule:     AICD/PPM: [ ] yes   [ ] no    PERTINENT LAB DATA:                      PHYSICAL EXAMINATION:    T(C): --  HR: --  BP: --  RR: --  SpO2: --    Constitutional: NAD  HEENT: PERRLA, EOMI,    Neck:  No JVD  Respiratory: CTAB/L  Cardiovascular: S1 and S2  Gastrointestinal: BS+, soft, NT/ND  Extremities: No peripheral edema  Neurological: A/O x 3, no focal deficits  Psychiatric: Normal mood, normal affect  Skin: No rashes    ASA Class: I [ ]  II [ ]  III [ ]  IV [ ]    COMMENTS:    The patient is a suitable candidate for the planned procedure unless box checked [ ]  No, explain:

## 2020-10-19 ENCOUNTER — APPOINTMENT (OUTPATIENT)
Dept: GASTROENTEROLOGY | Facility: HOSPITAL | Age: 75
End: 2020-10-19
Payer: MEDICARE

## 2020-10-19 ENCOUNTER — RESULT REVIEW (OUTPATIENT)
Age: 75
End: 2020-10-19

## 2020-10-19 ENCOUNTER — OUTPATIENT (OUTPATIENT)
Dept: OUTPATIENT SERVICES | Facility: HOSPITAL | Age: 75
LOS: 1 days | End: 2020-10-19
Payer: MEDICARE

## 2020-10-19 VITALS
HEART RATE: 55 BPM | SYSTOLIC BLOOD PRESSURE: 11 MMHG | DIASTOLIC BLOOD PRESSURE: 70 MMHG | RESPIRATION RATE: 15 BRPM | OXYGEN SATURATION: 99 %

## 2020-10-19 VITALS
OXYGEN SATURATION: 100 % | RESPIRATION RATE: 19 BRPM | HEIGHT: 61 IN | TEMPERATURE: 97 F | SYSTOLIC BLOOD PRESSURE: 130 MMHG | HEART RATE: 64 BPM | WEIGHT: 132.06 LBS | DIASTOLIC BLOOD PRESSURE: 61 MMHG

## 2020-10-19 DIAGNOSIS — K22.710 BARRETT'S ESOPHAGUS WITH LOW GRADE DYSPLASIA: ICD-10-CM

## 2020-10-19 PROCEDURE — 88305 TISSUE EXAM BY PATHOLOGIST: CPT | Mod: 26

## 2020-10-19 PROCEDURE — 88305 TISSUE EXAM BY PATHOLOGIST: CPT

## 2020-10-19 PROCEDURE — 88312 SPECIAL STAINS GROUP 1: CPT

## 2020-10-19 PROCEDURE — 43239 EGD BIOPSY SINGLE/MULTIPLE: CPT

## 2020-10-19 PROCEDURE — 88312 SPECIAL STAINS GROUP 1: CPT | Mod: 26

## 2020-10-19 RX ADMIN — SODIUM CHLORIDE 30 MILLILITER(S): 9 INJECTION INTRAMUSCULAR; INTRAVENOUS; SUBCUTANEOUS at 16:36

## 2020-10-19 NOTE — ASU PATIENT PROFILE, ADULT - PMH
Acid Reflux    Dyslipidemia    HTN (Hypertension)    Lumbar Spinal Stenosis    Lung nodules    Obesity    Ovarian Cyst

## 2020-10-27 PROBLEM — R91.8 OTHER NONSPECIFIC ABNORMAL FINDING OF LUNG FIELD: Chronic | Status: ACTIVE | Noted: 2020-10-19

## 2020-11-20 ENCOUNTER — APPOINTMENT (OUTPATIENT)
Dept: DISASTER EMERGENCY | Facility: CLINIC | Age: 75
End: 2020-11-20

## 2020-11-21 LAB — SARS-COV-2 N GENE NPH QL NAA+PROBE: NOT DETECTED

## 2020-11-21 RX ORDER — SODIUM CHLORIDE 9 MG/ML
1000 INJECTION INTRAMUSCULAR; INTRAVENOUS; SUBCUTANEOUS
Refills: 0 | Status: DISCONTINUED | OUTPATIENT
Start: 2020-11-23 | End: 2020-12-08

## 2020-11-21 NOTE — PRE PROCEDURE NOTE - PRE PROCEDURE EVALUATION
Attending Physician:   Gloria                         Procedure: EGD    Indication for Procedure: Barretts  ________________________________________________________  PAST MEDICAL & SURGICAL HISTORY:  Lung nodules    Obesity    Ovarian Cyst    Acid Reflux    Lumbar Spinal Stenosis    Dyslipidemia    HTN (Hypertension)    Benign Cyst of Breast  left    S/P Arthroscopy of Right Knee      ALLERGIES:  No Known Allergies    HOME MEDICATIONS:  amlodipine-benazepril 5 mg-10 mg oral capsule: 1 cap(s) orally once a day  Aspirin Enteric Coated 81 mg oral delayed release tablet: 1 tab(s) orally once a day  Calcium 600+D oral tablet: 1 tab(s) orally 1 times a day  Lipitor 10 mg oral tablet: 1 tab(s) orally once a day  omeprazole 20 mg oral delayed release capsule: 1 cap(s) orally once a day  Vitamin B-12 1000 mcg oral tablet: 1 tab(s) orally once a day  Vitamin D3 1000 intl units (25 mcg) oral capsule:     AICD/PPM: [ ] yes   [ ] no    PERTINENT LAB DATA:                      PHYSICAL EXAMINATION:    T(C): --  HR: --  BP: --  RR: --  SpO2: --    Constitutional: NAD  HEENT: PERRLA, EOMI,    Neck:  No JVD  Respiratory: CTAB/L  Cardiovascular: S1 and S2  Gastrointestinal: BS+, soft, NT/ND  Extremities: No peripheral edema  Neurological: A/O x 3, no focal deficits  Psychiatric: Normal mood, normal affect  Skin: No rashes    ASA Class: I [ ]  II [ ]  III [ ]  IV [ ]    COMMENTS:    The patient is a suitable candidate for the planned procedure unless box checked [ ]  No, explain:

## 2020-11-23 ENCOUNTER — OUTPATIENT (OUTPATIENT)
Dept: OUTPATIENT SERVICES | Facility: HOSPITAL | Age: 75
LOS: 1 days | End: 2020-11-23
Payer: MEDICARE

## 2020-11-23 ENCOUNTER — APPOINTMENT (OUTPATIENT)
Dept: GASTROENTEROLOGY | Facility: HOSPITAL | Age: 75
End: 2020-11-23

## 2020-11-23 VITALS
SYSTOLIC BLOOD PRESSURE: 125 MMHG | TEMPERATURE: 98 F | RESPIRATION RATE: 16 BRPM | HEART RATE: 68 BPM | DIASTOLIC BLOOD PRESSURE: 65 MMHG | OXYGEN SATURATION: 99 % | WEIGHT: 130.07 LBS | HEIGHT: 60 IN

## 2020-11-23 VITALS
OXYGEN SATURATION: 99 % | HEART RATE: 64 BPM | RESPIRATION RATE: 16 BRPM | SYSTOLIC BLOOD PRESSURE: 140 MMHG | DIASTOLIC BLOOD PRESSURE: 69 MMHG

## 2020-11-23 DIAGNOSIS — K22.710 BARRETT'S ESOPHAGUS WITH LOW GRADE DYSPLASIA: ICD-10-CM

## 2020-11-23 PROCEDURE — C1886: CPT

## 2020-11-23 PROCEDURE — 43270 EGD LESION ABLATION: CPT | Mod: GC

## 2020-11-23 PROCEDURE — 43270 EGD LESION ABLATION: CPT

## 2020-11-23 RX ORDER — SUCRALFATE 1 G
1 TABLET ORAL ONCE
Refills: 0 | Status: DISCONTINUED | OUTPATIENT
Start: 2020-11-23 | End: 2020-12-08

## 2020-11-23 RX ORDER — DIPHENHYDRAMINE HYDROCHLORIDE AND LIDOCAINE HYDROCHLORIDE AND ALUMINUM HYDROXIDE AND MAGNESIUM HYDRO
5 KIT ONCE
Refills: 0 | Status: DISCONTINUED | OUTPATIENT
Start: 2020-11-23 | End: 2020-12-08

## 2020-11-23 RX ADMIN — SODIUM CHLORIDE 75 MILLILITER(S): 9 INJECTION INTRAMUSCULAR; INTRAVENOUS; SUBCUTANEOUS at 13:47

## 2020-11-23 NOTE — ASU PREOP CHECKLIST - SITE MARKED BY SURGEON
Nutrition Assessment    Type and Reason for Visit: Initial, Positive Nutrition Screen(poor oral intake, unplanned weight loss)    Nutrition Recommendations:   Continue current diet. ONS initiated, Ensure Enlive TID, continue. Consider a multivitamin, folic acid, and thiamin supplementation given alcohol abuse history. Nutrition Assessment:   Pt. severely malnourished AEB poor oral intake prior to admit for months, 11.4% unplanned weight loss in the last 4 months, and severe muscle mass loss. At risk for further nutritional compromise r/t history of COPD and alcoholism and need for nutrition support. Will provide Ensure Enlive TID. Discussed cardiac, low sodium diet. Encouraged oral intake. Will recommend consider multivitamin, folic acid, and thiamin supplementation given alcohol abuse history. Malnutrition Assessment:  · Malnutrition Status: Meets the criteria for severe malnutrition  · Context: Chronic illness  · Findings of the 6 clinical characteristics of malnutrition (Minimum of 2 out of 6 clinical characteristics is required to make the diagnosis of moderate or severe Protein Calorie Malnutrition based on AND/ASPEN Guidelines):  1. Energy Intake-Greater than 75% of estimated energy requirement, Greater than or equal to 1 month    2. Weight Loss-(11.4% loss), (4 months)  3.  Muscle Loss-Severe muscle mass loss, Thigh (quadriceps), Clavicles (pectoralis and deltoids)    Nutrition Risk Level: High    Nutrient Needs:  · Estimated Daily Total Kcal: 8836-9587 kcals (25-30 kcals/kg/day based on 62 kg)   · Estimated Daily Protein (g): 74 grams or more (1.2 grams/kg/day based on 62 kg)     Nutrition Diagnosis:   · Problem: Severe malnutrition, In context of chronic illness  · Etiology: related to Insufficient energy/nutrient consumption, Catabolic illness(COPD)     Signs and symptoms:  as evidenced by Diet history of poor intake, Weight loss, Severe muscle loss    Objective n/a

## 2020-11-24 ENCOUNTER — NON-APPOINTMENT (OUTPATIENT)
Age: 75
End: 2020-11-24

## 2020-12-16 PROBLEM — Z87.09 HISTORY OF UPPER RESPIRATORY INFECTION: Status: RESOLVED | Noted: 2018-07-23 | Resolved: 2020-12-16

## 2020-12-24 ENCOUNTER — APPOINTMENT (OUTPATIENT)
Dept: DISASTER EMERGENCY | Facility: CLINIC | Age: 75
End: 2020-12-24

## 2020-12-25 LAB — SARS-COV-2 N GENE NPH QL NAA+PROBE: NOT DETECTED

## 2021-01-12 ENCOUNTER — NON-APPOINTMENT (OUTPATIENT)
Age: 76
End: 2021-01-12

## 2021-02-03 ENCOUNTER — TRANSCRIPTION ENCOUNTER (OUTPATIENT)
Age: 76
End: 2021-02-03

## 2021-02-05 ENCOUNTER — APPOINTMENT (OUTPATIENT)
Dept: PULMONOLOGY | Facility: CLINIC | Age: 76
End: 2021-02-05
Payer: MEDICARE

## 2021-02-05 VITALS
BODY MASS INDEX: 26.03 KG/M2 | TEMPERATURE: 98.2 F | DIASTOLIC BLOOD PRESSURE: 64 MMHG | WEIGHT: 124 LBS | HEART RATE: 74 BPM | HEIGHT: 58 IN | SYSTOLIC BLOOD PRESSURE: 109 MMHG | OXYGEN SATURATION: 99 %

## 2021-02-05 PROCEDURE — 99213 OFFICE O/P EST LOW 20 MIN: CPT

## 2021-02-06 NOTE — REASON FOR VISIT
[Follow-Up] : a follow-up visit [Abnormal CXR/ Chest CT] : an abnormal CXR/ chest CT [Lung Cancer] : lung cancer [Asthma] : asthma

## 2021-02-06 NOTE — ASSESSMENT
[FreeTextEntry1] : Clinically stable findings on follow-up chest CT inconsistent with neoplasm.  Recommend inhaler trial

## 2021-02-06 NOTE — HISTORY OF PRESENT ILLNESS
[TextBox_4] : Lung cancer status post resection follow-up chest CT showing pulmonary nodules consistent with mucoid impaction.  No respiratory complaints no cough no shortness of breath

## 2021-02-14 DIAGNOSIS — C22.1 INTRAHEPATIC BILE DUCT CARCINOMA: ICD-10-CM

## 2021-02-16 ENCOUNTER — NON-APPOINTMENT (OUTPATIENT)
Age: 76
End: 2021-02-16

## 2021-02-16 ENCOUNTER — APPOINTMENT (OUTPATIENT)
Dept: DISASTER EMERGENCY | Facility: CLINIC | Age: 76
End: 2021-02-16

## 2021-02-17 LAB — SARS-COV-2 N GENE NPH QL NAA+PROBE: NOT DETECTED

## 2021-02-19 ENCOUNTER — OUTPATIENT (OUTPATIENT)
Dept: OUTPATIENT SERVICES | Facility: HOSPITAL | Age: 76
LOS: 1 days | Discharge: HOME | End: 2021-02-19
Payer: MEDICARE

## 2021-02-19 ENCOUNTER — APPOINTMENT (OUTPATIENT)
Dept: GASTROENTEROLOGY | Facility: HOSPITAL | Age: 76
End: 2021-02-19

## 2021-02-19 ENCOUNTER — APPOINTMENT (OUTPATIENT)
Dept: DISASTER EMERGENCY | Facility: CLINIC | Age: 76
End: 2021-02-19

## 2021-02-19 VITALS
HEART RATE: 58 BPM | RESPIRATION RATE: 19 BRPM | HEIGHT: 61 IN | SYSTOLIC BLOOD PRESSURE: 130 MMHG | TEMPERATURE: 99 F | DIASTOLIC BLOOD PRESSURE: 71 MMHG | OXYGEN SATURATION: 100 % | WEIGHT: 125 LBS

## 2021-02-19 VITALS
HEART RATE: 68 BPM | SYSTOLIC BLOOD PRESSURE: 131 MMHG | OXYGEN SATURATION: 98 % | DIASTOLIC BLOOD PRESSURE: 66 MMHG | RESPIRATION RATE: 15 BRPM

## 2021-02-19 DIAGNOSIS — K22.70 BARRETT'S ESOPHAGUS WITHOUT DYSPLASIA: ICD-10-CM

## 2021-02-19 PROCEDURE — 43270 EGD LESION ABLATION: CPT | Mod: GC

## 2021-02-19 RX ORDER — SODIUM CHLORIDE 9 MG/ML
500 INJECTION INTRAMUSCULAR; INTRAVENOUS; SUBCUTANEOUS
Refills: 0 | Status: COMPLETED | OUTPATIENT
Start: 2021-02-19 | End: 2021-02-19

## 2021-02-19 RX ADMIN — SODIUM CHLORIDE 30 MILLILITER(S): 9 INJECTION INTRAMUSCULAR; INTRAVENOUS; SUBCUTANEOUS at 10:29

## 2021-02-24 ENCOUNTER — NON-APPOINTMENT (OUTPATIENT)
Age: 76
End: 2021-02-24

## 2021-04-02 ENCOUNTER — NON-APPOINTMENT (OUTPATIENT)
Age: 76
End: 2021-04-02

## 2021-04-28 ENCOUNTER — APPOINTMENT (OUTPATIENT)
Dept: ORTHOPEDIC SURGERY | Facility: CLINIC | Age: 76
End: 2021-04-28
Payer: MEDICARE

## 2021-04-28 VITALS — DIASTOLIC BLOOD PRESSURE: 83 MMHG | HEART RATE: 78 BPM | SYSTOLIC BLOOD PRESSURE: 129 MMHG | TEMPERATURE: 98.4 F

## 2021-04-28 DIAGNOSIS — S30.0XXA CONTUSION OF LOWER BACK AND PELVIS, INITIAL ENCOUNTER: ICD-10-CM

## 2021-04-28 PROCEDURE — 72170 X-RAY EXAM OF PELVIS: CPT | Mod: 59

## 2021-04-28 PROCEDURE — 99214 OFFICE O/P EST MOD 30 MIN: CPT

## 2021-04-28 PROCEDURE — 72100 X-RAY EXAM L-S SPINE 2/3 VWS: CPT

## 2021-04-28 NOTE — HISTORY OF PRESENT ILLNESS
[5] : a current pain level of 5/10 [Intermit.] : ~He/She~ states the symptoms seem to be intermittent [Prolonged Sitting] : worsened by prolonged sitting [Walking] : worsened by walking [Rest] : relieved by rest [Stable] : stable [___ wks] : [unfilled] week(s) ago [de-identified] : Patient is here today for followup visit of low back pain. 1 week ago patient states that she fainted and blacked out. Saw her PCP, was cleared. Doesnt know how she fell but was seen by  afterward. Had bruising over left buttock.\par \par Her left side lower back hurts more than her right side. She states that she is walks with limping. She also has pressure when she has to use the bathroom.  [Prolonged Standing] : not worsened by prolonged standing [Sitting] : not worsened by sitting [Standing] : not worsened by standing [Weight Bearing] : not worsened by weight bearing [Acetaminophen] : not relieved by acetaminophen [Acupuncture] : not relieved by acupuncture [Chiropractic] : not relieved by chiropractic manipulation [Exercise Regimen] : not relieved by exercise regimen [Heat] : not relieved by heat [Ice] : not relieved by ice [NSAIDs] : not relieved by nonsteroidal anti-inflammatory drugs [Opioid Analgesics] : not relieved by opioid analgesics [Physical Therapy] : not relieved by physical therapy [Recumbency] : not relieved by recumbency [Ataxia] : no ataxia [Incontinence] : no incontinence [Loss of Dexterity] : good dexterity [Urinary Ret.] : no urinary retention [de-identified] : sitting to standing [de-identified] : advil as needed

## 2021-04-28 NOTE — PHYSICAL EXAM
[Stooped] : stooped [Antalgic] : antalgic [Limited] : is limited [Painful] : is painful [LE] : Sensory: Intact in bilateral lower extremities [0] : left patella 0 [1+] : left ankle jerk 1+ [DP] : dorsalis pedis 2+ and symmetric bilaterally [PT] : posterior tibial 2+ and symmetric bilaterally [de-identified] : AP and lateral of his lumbar spine obtained today were compared with x-rays obtained August 5, 2019.  Previously noted right-sided lumbar curve with apex at the L2-3 level is again seen.  Degenerative changes seen throughout.  Diffuse osteopenia.  On the lateral projection significant degeneration from L2-S1.  Chronic superior plate compression of L2 is again seen.  No obvious new fractures noted.\par \par AP pelvis demonstrates normal appearance of the hips bilaterally.  No obvious fractures.  Mild sacroiliac degeneration bilaterally. [Poor Appearance] : well-appearing [Acute Distress] : not in acute distress [Obese] : not obese [Abl Mood] : in a normal mood [Abl Affect] : with normal affect [Poor Coordination] : normal coordination [Disorientation] : oriented x 3 [SLR] : negative straight leg raise [Plantar Reflex Right Only] : absent on the right [Plantar Reflex Left Only] : absent on the left [DTR Reflexes Clonus Of Right Ankle (___ Beats)] : absent on the right [DTR Reflexes Clonus Of Left Ankle (___ Beats)] : absent on the left [FreeTextEntry2] : The pt is awake, alert and oriented to self, place and time, is comfortable and in no acute distress. Inspection of neck, back and lower extremities bilaterally reveals no rashes or ecchymotic lesions.  There is no obvious abnormal spinal curvature in the sagittal and coronal planes. There is no tenderness over the cervical, thoracic or lumbar spine, or the paraspinal or upper and lower extremities musculature. There is no sacroiliac tenderness. No greater trochanteric tenderness bilaterally. No atrophy or abnormal movements noted in the upper or lower extremities. There is no swelling noted in the upper or lower extremities bilaterally. No cervical lymphadenopathy noted anteriorly. No joint laxity noted in the upper and lower extremity joints bilaterally.\par  Hip range of motion is degrees internal rotation 30° external rotation without pain. Full range of motion of the shoulders bilaterally with no significant pain\par Negative straight leg raise to 45° in the sitting position bilaterally. There is no groin pain with hip internal rotation and a negative RJ test bilaterally.  [de-identified] : seen with her  [de-identified] : ext 20 degrees with pain, flexion to her mid tibia with less pain

## 2021-04-28 NOTE — DISCUSSION/SUMMARY
[Medication Risks Reviewed] : Medication risks reviewed [de-identified] : The patient is here for evaluation of left-sided buttock pain after a fall and a syncopal episode.  She has been seen by her primary care physicians and cleared from the incident.  I did recommend caution with ambulation and avoidance of falls.  X-rays today do not show any obvious fractures and chronic degenerative changes are noted.  I suspect an acute contusion of the left gluteus muscles related to her fall.  The  reported a hematoma which has since resolved and I do not see evidence of it today.  Recommended heating pads and topical ointments as tolerated by the patient including Voltaren gel.  She has found Advil helpful as well and may continue use of over-the-counter Advil as needed.\par \par I will see her back on as-needed basis in 2 weeks.  If her symptoms persist or worsen additional imaging studies may be considered.\par \par The patient was educated regarding their condition, treatment options as well as prescribed course of treatment. \par Risks and benefits as well as alternatives to the proposed treatment were also provided to the patient \par They were given the opportunity to have all their questions answered to their satisfaction.\par \par Vital signs were reviewed with the patient and the patient was instructed to followup with their primary care provider for further management.\par \par Healthy lifestyle recommendations were also made including a tobacco free lifestyle, proper diet, and weight control.

## 2021-05-11 ENCOUNTER — APPOINTMENT (OUTPATIENT)
Dept: PULMONOLOGY | Facility: CLINIC | Age: 76
End: 2021-05-11
Payer: MEDICARE

## 2021-05-11 VITALS
OXYGEN SATURATION: 99 % | DIASTOLIC BLOOD PRESSURE: 74 MMHG | TEMPERATURE: 97.3 F | HEART RATE: 81 BPM | SYSTOLIC BLOOD PRESSURE: 111 MMHG

## 2021-05-11 PROCEDURE — 99214 OFFICE O/P EST MOD 30 MIN: CPT

## 2021-05-21 ENCOUNTER — LABORATORY RESULT (OUTPATIENT)
Age: 76
End: 2021-05-21

## 2021-05-21 ENCOUNTER — APPOINTMENT (OUTPATIENT)
Dept: DISASTER EMERGENCY | Facility: CLINIC | Age: 76
End: 2021-05-21

## 2021-05-23 NOTE — PRE PROCEDURE NOTE - PRE PROCEDURE EVALUATION
Attending Physician:   Gloria                         Procedure: EGD    Indication for Procedure: Barretts  ________________________________________________________  PAST MEDICAL & SURGICAL HISTORY:  HTN (Hypertension)    Dyslipidemia    Lumbar Spinal Stenosis    Acid Reflux    Ovarian Cyst    Obesity    Lung nodules    S/P Arthroscopy of Right Knee    Benign Cyst of Breast  left      ALLERGIES:  No Known Allergies    HOME MEDICATIONS:  amlodipine-benazepril 5 mg-10 mg oral capsule: 1 cap(s) orally once a day  Aspirin Enteric Coated 81 mg oral delayed release tablet: 1 tab(s) orally once a day  Calcium 600+D oral tablet: 1 tab(s) orally 1 times a day  Lipitor 10 mg oral tablet: 1 tab(s) orally once a day  omeprazole 20 mg oral delayed release capsule: 1 cap(s) orally once a day  Vitamin B-12 1000 mcg oral tablet: 1 tab(s) orally once a day  Vitamin D3 1000 intl units (25 mcg) oral capsule:     AICD/PPM: [ ] yes   [ ] no    PERTINENT LAB DATA:                      PHYSICAL EXAMINATION:    T(C): --  HR: --  BP: --  RR: --  SpO2: --    Constitutional: NAD  HEENT: PERRLA, EOMI,    Neck:  No JVD  Respiratory: CTAB/L  Cardiovascular: S1 and S2  Gastrointestinal: BS+, soft, NT/ND  Extremities: No peripheral edema  Neurological: A/O x 3, no focal deficits  Psychiatric: Normal mood, normal affect  Skin: No rashes    ASA Class: I [ ]  II [ ]  III [ ]  IV [ ]    COMMENTS:    The patient is a suitable candidate for the planned procedure unless box checked [ ]  No, explain:

## 2021-05-24 ENCOUNTER — APPOINTMENT (OUTPATIENT)
Dept: GASTROENTEROLOGY | Facility: HOSPITAL | Age: 76
End: 2021-05-24

## 2021-05-24 ENCOUNTER — OUTPATIENT (OUTPATIENT)
Dept: OUTPATIENT SERVICES | Facility: HOSPITAL | Age: 76
LOS: 1 days | End: 2021-05-24
Payer: MEDICARE

## 2021-05-24 VITALS
WEIGHT: 121.92 LBS | TEMPERATURE: 98 F | HEIGHT: 59 IN | SYSTOLIC BLOOD PRESSURE: 120 MMHG | DIASTOLIC BLOOD PRESSURE: 88 MMHG | OXYGEN SATURATION: 99 % | HEART RATE: 55 BPM | RESPIRATION RATE: 13 BRPM

## 2021-05-24 VITALS
DIASTOLIC BLOOD PRESSURE: 63 MMHG | OXYGEN SATURATION: 100 % | SYSTOLIC BLOOD PRESSURE: 121 MMHG | RESPIRATION RATE: 17 BRPM | HEART RATE: 65 BPM

## 2021-05-24 DIAGNOSIS — K22.710 BARRETT'S ESOPHAGUS WITH LOW GRADE DYSPLASIA: ICD-10-CM

## 2021-05-24 PROCEDURE — 43270 EGD LESION ABLATION: CPT

## 2021-05-24 PROCEDURE — 43229 ESOPHAGOSCOPY LESION ABLATE: CPT | Mod: GC

## 2021-05-24 PROCEDURE — C1886: CPT

## 2021-05-24 RX ORDER — SUCRALFATE 1 G
1 TABLET ORAL ONCE
Refills: 0 | Status: COMPLETED | OUTPATIENT
Start: 2021-05-24 | End: 2021-05-24

## 2021-05-24 RX ORDER — DIPHENHYDRAMINE HYDROCHLORIDE AND LIDOCAINE HYDROCHLORIDE AND ALUMINUM HYDROXIDE AND MAGNESIUM HYDRO
5 KIT ONCE
Refills: 0 | Status: COMPLETED | OUTPATIENT
Start: 2021-05-24 | End: 2021-05-24

## 2021-05-24 RX ORDER — SODIUM CHLORIDE 9 MG/ML
500 INJECTION INTRAMUSCULAR; INTRAVENOUS; SUBCUTANEOUS
Refills: 0 | Status: COMPLETED | OUTPATIENT
Start: 2021-05-24 | End: 2021-05-24

## 2021-05-24 RX ORDER — ACETAMINOPHEN 500 MG
1000 TABLET ORAL ONCE
Refills: 0 | Status: COMPLETED | OUTPATIENT
Start: 2021-05-24 | End: 2021-05-24

## 2021-05-24 RX ADMIN — SODIUM CHLORIDE 30 MILLILITER(S): 9 INJECTION INTRAMUSCULAR; INTRAVENOUS; SUBCUTANEOUS at 12:14

## 2021-05-24 RX ADMIN — Medication 1 GRAM(S): at 12:15

## 2021-05-24 RX ADMIN — DIPHENHYDRAMINE HYDROCHLORIDE AND LIDOCAINE HYDROCHLORIDE AND ALUMINUM HYDROXIDE AND MAGNESIUM HYDRO 5 MILLILITER(S): KIT at 12:15

## 2021-05-24 RX ADMIN — Medication 400 MILLIGRAM(S): at 12:15

## 2021-05-24 NOTE — ASU PREOP CHECKLIST - HEART RATE (BEATS/MIN)
13-Mar-2019 21:06 13-Mar-2019 22:16 14-Mar-2019 08:30 14-Mar-2019 08:37 14-Mar-2019 13:51 15-Mar-2019 09:42 15-Mar-2019 12:00 55

## 2021-05-25 ENCOUNTER — NON-APPOINTMENT (OUTPATIENT)
Age: 76
End: 2021-05-25

## 2021-08-20 ENCOUNTER — RX RENEWAL (OUTPATIENT)
Age: 76
End: 2021-08-20

## 2021-08-24 ENCOUNTER — TRANSCRIPTION ENCOUNTER (OUTPATIENT)
Age: 76
End: 2021-08-24

## 2021-09-08 DIAGNOSIS — Z20.822 CONTACT WITH AND (SUSPECTED) EXPOSURE TO COVID-19: ICD-10-CM

## 2021-09-16 NOTE — ED ADULT NURSE NOTE - FALL HARM RISK TYPE OF ASSESSMENT
An intravascular ultrasound (IVUS) was performed in the left anterior descending artery using an ([facility] Ivus) ultrasound catheter.  Daily Assessment

## 2021-09-17 ENCOUNTER — APPOINTMENT (OUTPATIENT)
Dept: DISASTER EMERGENCY | Facility: CLINIC | Age: 76
End: 2021-09-17

## 2021-09-18 LAB — SARS-COV-2 N GENE NPH QL NAA+PROBE: NOT DETECTED

## 2021-09-20 ENCOUNTER — NON-APPOINTMENT (OUTPATIENT)
Age: 76
End: 2021-09-20

## 2021-09-20 ENCOUNTER — OUTPATIENT (OUTPATIENT)
Dept: OUTPATIENT SERVICES | Facility: HOSPITAL | Age: 76
LOS: 1 days | Discharge: HOME | End: 2021-09-20
Payer: MEDICARE

## 2021-09-20 ENCOUNTER — APPOINTMENT (OUTPATIENT)
Dept: GASTROENTEROLOGY | Facility: HOSPITAL | Age: 76
End: 2021-09-20

## 2021-09-20 VITALS
RESPIRATION RATE: 16 BRPM | HEIGHT: 60 IN | TEMPERATURE: 98 F | SYSTOLIC BLOOD PRESSURE: 133 MMHG | HEART RATE: 61 BPM | DIASTOLIC BLOOD PRESSURE: 73 MMHG | WEIGHT: 128.97 LBS

## 2021-09-20 VITALS
RESPIRATION RATE: 18 BRPM | SYSTOLIC BLOOD PRESSURE: 128 MMHG | DIASTOLIC BLOOD PRESSURE: 60 MMHG | OXYGEN SATURATION: 100 %

## 2021-09-20 DIAGNOSIS — K22.710 BARRETT'S ESOPHAGUS WITH LOW GRADE DYSPLASIA: ICD-10-CM

## 2021-09-20 PROCEDURE — 43229 ESOPHAGOSCOPY LESION ABLATE: CPT | Mod: GC

## 2021-09-20 NOTE — ASU PATIENT PROFILE, ADULT - ACCEPTABLE
I have reviewed discharge instructions with the patient. The patient verbalized understanding. Patient left ED via Discharge Method: ambulatory to Home with self. Opportunity for questions and clarification provided. Patient given 4 scripts. To continue your aftercare when you leave the hospital, you may receive an automated call from our care team to check in on how you are doing. This is a free service and part of our promise to provide the best care and service to meet your aftercare needs.  If you have questions, or wish to unsubscribe from this service please call [phone]. Thank you for Choosing our [facility] Emergency Department. 0

## 2021-09-20 NOTE — PACU DISCHARGE NOTE - NSCLINEINSERTRD_GEN_ALL_CORE
EMERGENCY DEPARTMENT HISTORY AND PHYSICAL EXAM      Date: 8/19/2021  Patient Name: Edward Boss III    History of Presenting Illness     Chief Complaint   Patient presents with    Seizure       History Provided By: Patient    HPI: Edward Boss III, 28 y.o. male with a past medical history of seizure on Keppra 1000 mg twice daily and asthma presents to the ED for seizures. Per report, the patient has had 5 seizures despite compliance with his Keppra. He had a 6th seizure in EMS and was described as tonic clonic. He reports headache for the past 4 days and reports falling down and hitting his head. He also reports some congestion and sore throat. Denies any fevers or chills. Reports some chest discomfort and cough for the past few days. Denies abdominal ambrosio pain. Has been nauseous and threw up yesterday. He is tolerating p.o. intake otherwise. No constipation. No other complaints. There are no other complaints, changes, or physical findings at this time. PCP: None    Current Facility-Administered Medications   Medication Dose Route Frequency Provider Last Rate Last Admin    LORazepam (ATIVAN) injection 2 mg  2 mg IntraVENous ONCE PRN Roger Muhammad MD         Current Outpatient Medications   Medication Sig Dispense Refill    levETIRAcetam 1,000 mg tablet Take 2 Tablets by mouth two (2) times a day. 120 Tablet 0    lacosamide (Vimpat) 50 mg tab tablet Take 1 Tablet by mouth two (2) times a day. Max Daily Amount: 100 mg. 60 Tablet 0    gabapentin (NEURONTIN) 800 mg tablet Take 800 mg by mouth three (3) times daily.  buprenorphine-naloxone (SUBOXONE) 8-2 mg film sublingaul film 1 Film by SubLINGual route daily.  ketorolac (TORADOL) 10 mg tablet Take 1 Tab by mouth every six (6) hours as needed for Pain. 30 Tab 0    HYDROcodone-acetaminophen (NORCO) 5-325 mg per tablet Take 1 Tab by mouth every six (6) hours as needed for Pain. Max Daily Amount: 4 Tabs.  10 Tab 0    ibuprofen (MOTRIN) 600 mg tablet Take 1 Tab by mouth every six (6) hours as needed for Pain. 20 Tab 0       Past History   I reviewed the past medical hx, surgical hx, family hx, social hx, and allergy information and are listed here:    Past Medical History:  Past Medical History:   Diagnosis Date    Asthma     Kidney stones     Seizures ([address]        Past Surgical History:  Past Surgical History:   Procedure Laterality Date    HX LITHOTRIPSY      x2       Family History:  No family history on file. Social History:  Social History     Tobacco Use    Smoking status: Current Every Day Smoker     Packs/day: 0.50    Smokeless tobacco: Never Used   Substance Use Topics    Alcohol use: No     Comment: occ    Drug use: Yes     Types: Marijuana, Cocaine, Heroin     Comment: used cocaine and heroin about 2 months ago (12/2017)       Allergies: Allergies   Allergen Reactions    Pcn [Penicillins] Hives       Review of Systems     Review of Systems   Constitutional: Negative for chills and fever. HENT: Positive for congestion, rhinorrhea and sore throat. Eyes: Negative. Respiratory: Positive for cough and shortness of breath. Negative for wheezing. Cardiovascular: Negative for chest pain and leg swelling. Gastrointestinal: Positive for nausea and vomiting. Negative for abdominal pain. Endocrine: Negative. Genitourinary: Negative for dysuria, flank pain and hematuria. Musculoskeletal: Negative for arthralgias and back pain. Skin: Negative for rash and wound. Allergic/Immunologic: Negative. Neurological: Positive for seizures and headaches. Negative for dizziness, weakness and light-headedness. Hematological: Negative. Psychiatric/Behavioral: Negative for agitation and confusion. Physical Exam and Vital Signs   Vital Signs - Reviewed the patient's vital signs.     Patient Vitals for the past 12 hrs:   Temp Pulse Resp BP SpO2   08/19/21 1846 97.6 °F (36.4 °C) 97 19 116/76 94 % Physical Exam:    GENERAL: awake, alert, cooperative, not in distress  HEENT:  * Pupils equal, EOMI  * Head atraumatic  CV:  * regular rhythm  * +2 pulses in extremities bilaterally  PULMONARY: Good air movement, no wheezes or crackles  ABDOMEN: soft, not distended, not tender, no guarding  : No suprapubic tenderness  EXTREMITIES/BACK: warm and perfused, no tenderness, no edema  SKIN: no rashes or signs of trauma  NEURO:  * Speech clear  * Moves U&LE to command      Medical Decision Making and ED Course   - I am the first and primary provider for this patient and am the primary provider of record. - I reviewed the vital signs, available nursing notes, past medical history, past surgical history, family history and social history. - Initial assessment performed. The patients presenting problems have been discussed, and the staff are in agreement with the care plan formulated and outlined with them. I have encouraged them to ask questions as they arise throughout their visit. - Records Reviewed: Nursing Notes      MDM:   Patient is a 28 y.o. male presenting for seizures. Vitals reveal no abnormalities and physical exam reveals no normalities. Based on the history, physical exam, risk factors, and vitals signs, I favor the following differential diagnoses: Seizure disorder, breakthrough seizure, recurrent occurrences, ICH, CVA, COVID-19 infection, pneumothorax, pulmonary complication, pneumonia. Will load with keppra.     Results     Labs:     Recent Results (from the past 12 hour(s))   CBC WITH AUTOMATED DIFF    Collection Time: 08/19/21  7:10 PM   Result Value Ref Range    WBC 6.8 4.1 - 11.1 K/uL    RBC 4.46 4.10 - 5.70 M/uL    HGB 14.3 12.1 - 17.0 g/dL    HCT 41.1 36.6 - 50.3 %    MCV 92.2 80.0 - 99.0 FL    MCH 32.1 26.0 - 34.0 PG    MCHC 34.8 30.0 - 36.5 g/dL    RDW 11.2 (L) 11.5 - 14.5 %    PLATELET 577 858 - 348 K/uL    MPV 9.6 8.9 - 12.9 FL    NRBC 0.0 0.0  WBC    ABSOLUTE NRBC 0.00 0.00 - 0.01 K/uL    NEUTROPHILS 64 32 - 75 %    LYMPHOCYTES 23 12 - 49 %    MONOCYTES 8 5 - 13 %    EOSINOPHILS 4 0 - 7 %    BASOPHILS 1 0 - 1 %    IMMATURE GRANULOCYTES 0 0 - 0.5 %    ABS. NEUTROPHILS 4.3 1.8 - 8.0 K/UL    ABS. LYMPHOCYTES 1.6 0.8 - 3.5 K/UL    ABS. MONOCYTES 0.6 0.0 - 1.0 K/UL    ABS. EOSINOPHILS 0.3 0.0 - 0.4 K/UL    ABS. BASOPHILS 0.1 0.0 - 0.1 K/UL    ABS. IMM. GRANS. 0.0 0.00 - 0.04 K/UL    DF AUTOMATED     METABOLIC PANEL, COMPREHENSIVE    Collection Time: 08/19/21  7:10 PM   Result Value Ref Range    Sodium 143 136 - 145 mmol/L    Potassium 3.7 3.5 - 5.1 mmol/L    Chloride 114 (H) 97 - 108 mmol/L    CO2 23 21 - 32 mmol/L    Anion gap 6 5 - 15 mmol/L    Glucose 85 65 - 100 mg/dL    BUN 20 6 - 20 mg/dL    Creatinine 1.15 0.70 - 1.30 mg/dL    BUN/Creatinine ratio 17 12 - 20      GFR est AA >60 >60 ml/min/1.73m2    GFR est non-AA >60 >60 ml/min/1.73m2    Calcium 8.6 8.5 - 10.1 mg/dL    Bilirubin, total 0.2 0.2 - 1.0 mg/dL    AST (SGOT) 33 15 - 37 U/L    ALT (SGPT) 52 12 - 78 U/L    Alk. phosphatase 63 45 - 117 U/L    Protein, total 7.2 6.4 - 8.2 g/dL    Albumin 3.6 3.5 - 5.0 g/dL    Globulin 3.6 2.0 - 4.0 g/dL    A-G Ratio 1.0 (L) 1.1 - 2.2         Radiologic Studies:  CT Results  (Last 48 hours)               08/19/21 1958  CT HEAD WO CONT Final result    Impression:      1. No acute intracranial abnormality. Narrative:      Technique: axial noncontrast images were obtained from the skull base through   the vertex. All CT scans at this facility are performed using dose reduction optimization   techniques as appropriate to a performed exam including the following: Automated   exposure control, adjustments to the MA and/or KV according to patient size or   use of iterative reconstruction technique       Comparison: 3/21/2020       Findings: There is no acute intracranial hemorrhage. There is no midline shift, mass   effect or herniation.   The ventricles are symmetric, and within normal limits   for size. There is no discrete extra-axial fluid collection. Cortical grey/white differentiation is preserved. The paranasal sinuses and mastoid air cells are clear. The osseous structures   are intact. The orbits are unremarkable. CXR Results  (Last 48 hours)    None          Medications ordered:  Medications   LORazepam (ATIVAN) injection 2 mg (has no administration in time range)   levETIRAcetam (KEPPRA) 1500 mg in saline (iso-osm) 100 ml IVPB (1,500 mg IntraVENous New Bag 8/19/21 1935)   levETIRAcetam (KEPPRA) 500 mg in saline (iso-osm) 100 mL IVPB (premix) (500 mg IntraVENous New Bag 8/19/21 2135)        ED Course     ED Course:     ED Course as of Aug 20 0005   Thu Aug 19, 2021   2049 CT head is negative without any signs of acute bleed. Will consult neurology. CT HEAD WO CONT [SS]   2050 Labs are largely within normal limits, no significant electrolyte abnormalities or SAUL. CBC is within no limits, no concern for infection or anemia. [SS]   2053 Discussed case with neurology and recommended going up to 2 g twice daily and add Vimpat 50mg BID. We will continue low to 2 g Keppra and observe in the ED.    [SS]      ED Course User Index  [SS] Toney Warren MD       Reassessment / Disposition Discussion:    7:51 PM  Patient had a G TC seizure that lasted for 1 minute and aborted spontaneously without Ativan. Patient had not been loaded with Keppra. Will load now. Will consult neurology. 12:07 AM  Discussed the case with neurology including prolonged return to baseline and the seizure in the ED. They recommended increasing the Keppra to 2 g twice daily and adding Vimpat 50 mg twice daily. Disposition     Disposition: Condition improved  We will sign out the patient to the oncoming provider as he has not returned to baseline on the present.   He will need to be observed for the next few hours until he is back to baseline and to make sure the patient does not have any additional seizures. DISCHARGE PLAN:  1. Current Discharge Medication List      CONTINUE these medications which have NOT CHANGED    Details   gabapentin (NEURONTIN) 800 mg tablet Take 800 mg by mouth three (3) times daily. buprenorphine-naloxone (SUBOXONE) 8-2 mg film sublingaul film 1 Film by SubLINGual route daily. ketorolac (TORADOL) 10 mg tablet Take 1 Tab by mouth every six (6) hours as needed for Pain. Qty: 30 Tab, Refills: 0      HYDROcodone-acetaminophen (NORCO) 5-325 mg per tablet Take 1 Tab by mouth every six (6) hours as needed for Pain. Max Daily Amount: 4 Tabs. Qty: 10 Tab, Refills: 0      levETIRAcetam (KEPPRA) 750 mg tablet Take 1 Tab by mouth two (2) times a day. Qty: 60 Tab, Refills: 0      ibuprofen (MOTRIN) 600 mg tablet Take 1 Tab by mouth every six (6) hours as needed for Pain. Qty: 20 Tab, Refills: 0           2. Follow-up Information    None       3. Return to ED if worse   4. Current Discharge Medication List      START taking these medications    Details   lacosamide (Vimpat) 50 mg tab tablet Take 1 Tablet by mouth two (2) times a day. Max Daily Amount: 100 mg. Qty: 60 Tablet, Refills: 0  Start date: 8/20/2021    Associated Diagnoses: Seizure ([address]         CONTINUE these medications which have CHANGED    Details   levETIRAcetam 1,000 mg tablet Take 2 Tablets by mouth two (2) times a day. Qty: 120 Tablet, Refills: 0  Start date: 8/20/2021               Diagnosis     Clinical Impression:   1. Seizure [facility])        Attestations:    Aj Ovalle MD    Please note that this dictation was completed with [employer], the computer voice recognition software. Quite often unanticipated grammatical, syntax, homophones, and other interpretive errors are inadvertently transcribed by the computer software. Please disregard these errors. Please excuse any errors that have escaped final proofreading. Thank you. No

## 2021-09-20 NOTE — ASU PATIENT PROFILE, ADULT - NSICDXPASTMEDICALHX_GEN_ALL_CORE_FT
PAST MEDICAL HISTORY:  Acid Reflux     Dyslipidemia     HTN (Hypertension)     Lumbar Spinal Stenosis     Lung nodules     Obesity     Ovarian Cyst

## 2021-09-21 ENCOUNTER — NON-APPOINTMENT (OUTPATIENT)
Age: 76
End: 2021-09-21

## 2021-10-08 ENCOUNTER — APPOINTMENT (OUTPATIENT)
Dept: DISASTER EMERGENCY | Facility: CLINIC | Age: 76
End: 2021-10-08

## 2021-10-08 LAB — SARS-COV-2 N GENE NPH QL NAA+PROBE: NOT DETECTED

## 2021-10-12 ENCOUNTER — APPOINTMENT (OUTPATIENT)
Dept: PULMONOLOGY | Facility: CLINIC | Age: 76
End: 2021-10-12
Payer: MEDICARE

## 2021-10-12 VITALS
SYSTOLIC BLOOD PRESSURE: 108 MMHG | HEART RATE: 73 BPM | OXYGEN SATURATION: 97 % | DIASTOLIC BLOOD PRESSURE: 66 MMHG | TEMPERATURE: 97.6 F

## 2021-10-12 LAB — POCT - HEMOGLOBIN (HGB), QUANTITATIVE, TRANSCUTANEOUS: 12.2

## 2021-10-12 PROCEDURE — ZZZZZ: CPT

## 2021-10-12 PROCEDURE — 94729 DIFFUSING CAPACITY: CPT

## 2021-10-12 PROCEDURE — 88738 HGB QUANT TRANSCUTANEOUS: CPT

## 2021-10-12 PROCEDURE — 94010 BREATHING CAPACITY TEST: CPT

## 2021-10-12 PROCEDURE — 94727 GAS DIL/WSHOT DETER LNG VOL: CPT

## 2021-10-12 PROCEDURE — 99214 OFFICE O/P EST MOD 30 MIN: CPT | Mod: 25

## 2021-10-14 NOTE — HISTORY OF PRESENT ILLNESS
[TextBox_4] : Follow-up for asthma abnormal chest CT history of lung cancer no new complaints not short of breath no wheezing cough or congestion

## 2021-10-14 NOTE — ASSESSMENT
[FreeTextEntry1] : PFT stable\par Most recent CT finding either nonmalignant or low-grade malignancy recommend interval follow-up

## 2021-12-27 ENCOUNTER — RX RENEWAL (OUTPATIENT)
Age: 76
End: 2021-12-27

## 2022-01-02 LAB — SARS-COV-2 N GENE NPH QL NAA+PROBE: NOT DETECTED

## 2022-01-03 ENCOUNTER — APPOINTMENT (OUTPATIENT)
Dept: GASTROENTEROLOGY | Facility: HOSPITAL | Age: 77
End: 2022-01-03

## 2022-01-03 ENCOUNTER — OUTPATIENT (OUTPATIENT)
Dept: OUTPATIENT SERVICES | Facility: HOSPITAL | Age: 77
LOS: 1 days | Discharge: HOME | End: 2022-01-03
Payer: MEDICARE

## 2022-01-03 ENCOUNTER — NON-APPOINTMENT (OUTPATIENT)
Age: 77
End: 2022-01-03

## 2022-01-03 VITALS
TEMPERATURE: 97 F | DIASTOLIC BLOOD PRESSURE: 87 MMHG | HEIGHT: 60 IN | OXYGEN SATURATION: 99 % | HEART RATE: 74 BPM | RESPIRATION RATE: 16 BRPM | SYSTOLIC BLOOD PRESSURE: 148 MMHG | WEIGHT: 121.03 LBS

## 2022-01-03 VITALS
HEART RATE: 87 BPM | SYSTOLIC BLOOD PRESSURE: 161 MMHG | RESPIRATION RATE: 16 BRPM | OXYGEN SATURATION: 100 % | DIASTOLIC BLOOD PRESSURE: 93 MMHG

## 2022-01-03 DIAGNOSIS — K22.710 BARRETT'S ESOPHAGUS WITH LOW GRADE DYSPLASIA: ICD-10-CM

## 2022-01-03 PROCEDURE — 43229 ESOPHAGOSCOPY LESION ABLATE: CPT | Mod: GC

## 2022-01-03 DEVICE — CATH C2 CRYOBALLOON FOCAL PEAR: Type: IMPLANTABLE DEVICE | Status: FUNCTIONAL

## 2022-01-13 ENCOUNTER — NON-APPOINTMENT (OUTPATIENT)
Age: 77
End: 2022-01-13

## 2022-02-03 ENCOUNTER — TRANSCRIPTION ENCOUNTER (OUTPATIENT)
Age: 77
End: 2022-02-03

## 2022-03-08 ENCOUNTER — APPOINTMENT (OUTPATIENT)
Dept: PULMONOLOGY | Facility: CLINIC | Age: 77
End: 2022-03-08
Payer: MEDICARE

## 2022-03-08 VITALS
OXYGEN SATURATION: 100 % | RESPIRATION RATE: 14 BRPM | BODY MASS INDEX: 25.61 KG/M2 | DIASTOLIC BLOOD PRESSURE: 73 MMHG | HEIGHT: 58 IN | SYSTOLIC BLOOD PRESSURE: 119 MMHG | WEIGHT: 122 LBS | HEART RATE: 66 BPM | TEMPERATURE: 98 F

## 2022-03-08 VITALS
OXYGEN SATURATION: 100 % | HEIGHT: 58 IN | DIASTOLIC BLOOD PRESSURE: 73 MMHG | BODY MASS INDEX: 25.61 KG/M2 | TEMPERATURE: 98 F | RESPIRATION RATE: 14 BRPM | WEIGHT: 122 LBS | SYSTOLIC BLOOD PRESSURE: 119 MMHG | HEART RATE: 66 BPM

## 2022-03-08 DIAGNOSIS — Z01.812 ENCOUNTER FOR PREPROCEDURAL LABORATORY EXAMINATION: ICD-10-CM

## 2022-03-08 LAB — SARS-COV-2 AG RESP QL IA.RAPID: NEGATIVE

## 2022-03-08 PROCEDURE — 95012 NITRIC OXIDE EXP GAS DETER: CPT

## 2022-03-08 PROCEDURE — 87811 SARS-COV-2 COVID19 W/OPTIC: CPT | Mod: QW

## 2022-03-08 PROCEDURE — 94010 BREATHING CAPACITY TEST: CPT

## 2022-03-08 PROCEDURE — ZZZZZ: CPT

## 2022-03-08 PROCEDURE — 94729 DIFFUSING CAPACITY: CPT

## 2022-03-08 PROCEDURE — 94727 GAS DIL/WSHOT DETER LNG VOL: CPT

## 2022-03-08 PROCEDURE — 99214 OFFICE O/P EST MOD 30 MIN: CPT | Mod: 25

## 2022-03-08 NOTE — ASSESSMENT
[FreeTextEntry1] : New right upper lobe nodule in patient with prior lung cancer resection.  The right lower lobe mucoid impaction is unchanged.  Recommend referral for PET scan and likely surgical opinion

## 2022-03-08 NOTE — HISTORY OF PRESENT ILLNESS
[TextBox_4] : Follow-up for lung nodule-lung cancer.  Went for follow-up CT new right upper lobe nodule noted here for PFTs and follow-up.  No clinical change

## 2022-03-08 NOTE — PROCEDURE
[FreeTextEntry1] : CT noted for right upper lobe nodule 1 cm.  Appears somewhat more prominent than on prior imaging.

## 2022-03-22 ENCOUNTER — NON-APPOINTMENT (OUTPATIENT)
Age: 77
End: 2022-03-22

## 2022-03-22 ENCOUNTER — APPOINTMENT (OUTPATIENT)
Dept: THORACIC SURGERY | Facility: CLINIC | Age: 77
End: 2022-03-22
Payer: MEDICARE

## 2022-03-22 VITALS
DIASTOLIC BLOOD PRESSURE: 81 MMHG | HEIGHT: 59 IN | HEART RATE: 74 BPM | WEIGHT: 120 LBS | BODY MASS INDEX: 24.19 KG/M2 | OXYGEN SATURATION: 99 % | SYSTOLIC BLOOD PRESSURE: 133 MMHG

## 2022-03-22 PROCEDURE — 99205 OFFICE O/P NEW HI 60 MIN: CPT

## 2022-03-22 RX ORDER — FAMOTIDINE 20 MG/1
20 TABLET, FILM COATED ORAL
Qty: 30 | Refills: 5 | Status: DISCONTINUED | COMMUNITY
Start: 2021-09-20 | End: 2022-03-22

## 2022-03-22 RX ORDER — OMEPRAZOLE 20 MG/1
20 CAPSULE, DELAYED RELEASE ORAL DAILY
Refills: 3 | Status: DISCONTINUED | COMMUNITY
Start: 2020-08-17 | End: 2022-03-22

## 2022-03-22 RX ORDER — SUCRALFATE 1 G/1
1 TABLET ORAL
Qty: 90 | Refills: 11 | Status: DISCONTINUED | COMMUNITY
Start: 2021-09-20 | End: 2022-03-22

## 2022-03-22 RX ORDER — ALBUTEROL SULFATE 90 UG/1
108 (90 BASE) INHALANT RESPIRATORY (INHALATION)
Qty: 1 | Refills: 3 | Status: DISCONTINUED | COMMUNITY
Start: 2019-05-17 | End: 2022-03-22

## 2022-03-22 RX ORDER — OMEPRAZOLE 40 MG/1
40 CAPSULE, DELAYED RELEASE ORAL
Qty: 30 | Refills: 11 | Status: DISCONTINUED | COMMUNITY
Start: 2021-09-20 | End: 2022-03-22

## 2022-03-29 NOTE — DATA REVIEWED
[FreeTextEntry1] : Independently reviewed the following:\par - CT Chest on 2/28/22\par - PET/CT on 3/11/22

## 2022-03-29 NOTE — HISTORY OF PRESENT ILLNESS
[FreeTextEntry1] : Ms. HOSEA GRAY, 76 year old female, former smoker (3-4PPDx 15 years; Quit in 1980's), w/ hx of History of Johnson's esophagus;  lung cancer, status post RUL resection and RLL superior segmentectomy 2/25/2014 at [facility] (Stage I lung cancer?), Has been undergoing active surveillance. Most recent imaging with New, right lung nodules. Referred to office by Dr. Valdovinos for further evaluation. \par \par Of note: July, 2020: New finding in right lower lobe possible endobronchial lesion versus mucoid impaction. MRI did not demonstrate any conclusive findings..8/2020: s/p BAL. Path negative \par \par PFTs on 3/8/22: FVC: 1.78 (84%); FEV1: 1.25 (78%); DLCO: 10.42 (59%) \par \par CT Chest on 2/28/22: ([facility])\par - Stable post op changes within the right hemithorax\par - Evolving, irregular, noncalcified semisolid RUL nodule: 9mm (Series 11, Image 134). Solid component measures 5mm. Solid component not present on CT from 9/4/15 and now solid component is more prominent with compared to CT from 8/24/21.  The overall size of the lesion has increased from 9/4/15. \par - Additional subcentimeter noncalcified solid and groundglass pulmonary nodules are redemonstrated and unchanged. \par - Tubular branching soft tissue is again demonstrate within the posterior RLL likely representing mucoid impaction, unchanged. \par - Stable, tubular branching soft tissue density is present within the periphery of the left lower lobe as well, unchanged, again, favor mucoid impaction. \par - Left adrenal myolipoma \par \par PET/CT on 3/11/22: ([facility])\par - Non - FDG avid 9 x 7 mm irregular nodule in RUL (image 33)\par - New 1.2 x 1 cm nodular opacity in the RLL at the postsurgical site (Series 4, image 26) SUV 1.3 \par - Tubular branching soft tissue in the RUL without hypermetabolic uptake, likely mucoid impaction; 4 mm Peripheral nodule in the LL (Image 48)\par - No evidence of FDG avid lymphadenopathy \par \par Patient presents to office for consultation. Today. patient denies worsening SOB, chest pain, cough, hemoptysis, fever, chills, night sweats, lightheadedness or dizziness. Endorses that she will be undergoing esophageal ablation in 2 weeks with Dr. Castro. \par

## 2022-03-29 NOTE — ASSESSMENT
[FreeTextEntry1] : Ms. HOSEA GRAY, 76 year old female, Never a smoker, w/ hx of History of Johnson's esophagus;  lung cancer, status post RUL resection and RLL superior segmentectomy 2/25/2014 at [facility] (Stage I lung cancer?), Has been undergoing active surveillance. Most recent imaging with New, right lung nodules. Referred to office by Dr. Valdovinos for further evaluation.\par \par I have independently reviewed the medical records and imaging at the time of this office consultation, and discussed the following interpretations and recommendations with the patient:\par - Imaging reviewed; RUL nodule seen on CT is suspicious; However, there is a discrepancy between CT, and PET/CT findings. Referred back to Dr. Valdovinos for further evaluation regarding follow up imaging.  \par \par Recommendations reviewed with patient during this office visit, and all questions answered; Patient instructed on the importance of follow up and verbalizes understanding.\par \par I personally performed the services described in the documentation, reviewed the documentation recorded by the scribe in my presence and it accurately and completely records my words and actions.\par \par WALE, Deisi Munguia ANP-C, am scribing for and the presence of BREANNE Ingram, the following sections HISTORY OF PRESENT ILLNESS, PAST MEDICAL/FAMILY/SOCIAL HISTORY; REVIEW OF SYSTEMS; VITAL SIGNS; PHYSICAL EXAM; DISPOSITION.\par \par

## 2022-04-01 NOTE — ASU PATIENT PROFILE, ADULT - FALL HARM RISK - UNIVERSAL INTERVENTIONS
Bed in lowest position, wheels locked, appropriate side rails in place/Call bell, personal items and telephone in reach/Instruct patient to call for assistance before getting out of bed or chair/Non-slip footwear when patient is out of bed/[place] to call system/Physically safe environment - no spills, clutter or unnecessary equipment/Purposeful Proactive Rounding/Room/bathroom lighting operational, light cord in reach

## 2022-04-03 RX ORDER — SODIUM CHLORIDE 9 MG/ML
500 INJECTION, SOLUTION INTRAVENOUS
Refills: 0 | Status: DISCONTINUED | OUTPATIENT
Start: 2022-04-04 | End: 2022-04-18

## 2022-04-04 ENCOUNTER — RESULT REVIEW (OUTPATIENT)
Age: 77
End: 2022-04-04

## 2022-04-04 ENCOUNTER — OUTPATIENT (OUTPATIENT)
Dept: OUTPATIENT SERVICES | Facility: HOSPITAL | Age: 77
LOS: 1 days | Discharge: HOME | End: 2022-04-04
Payer: MEDICARE

## 2022-04-04 ENCOUNTER — APPOINTMENT (OUTPATIENT)
Dept: GASTROENTEROLOGY | Facility: HOSPITAL | Age: 77
End: 2022-04-04

## 2022-04-04 VITALS
RESPIRATION RATE: 18 BRPM | OXYGEN SATURATION: 100 % | DIASTOLIC BLOOD PRESSURE: 58 MMHG | HEART RATE: 68 BPM | SYSTOLIC BLOOD PRESSURE: 112 MMHG

## 2022-04-04 VITALS
OXYGEN SATURATION: 98 % | HEIGHT: 57 IN | WEIGHT: 119.93 LBS | RESPIRATION RATE: 16 BRPM | DIASTOLIC BLOOD PRESSURE: 56 MMHG | SYSTOLIC BLOOD PRESSURE: 123 MMHG | HEART RATE: 66 BPM | TEMPERATURE: 97 F

## 2022-04-04 DIAGNOSIS — K22.710 BARRETT'S ESOPHAGUS WITH LOW GRADE DYSPLASIA: ICD-10-CM

## 2022-04-04 PROCEDURE — 43239 EGD BIOPSY SINGLE/MULTIPLE: CPT | Mod: GC

## 2022-04-04 PROCEDURE — 88305 TISSUE EXAM BY PATHOLOGIST: CPT | Mod: 26

## 2022-04-09 ENCOUNTER — NON-APPOINTMENT (OUTPATIENT)
Age: 77
End: 2022-04-09

## 2022-04-14 ENCOUNTER — NON-APPOINTMENT (OUTPATIENT)
Age: 77
End: 2022-04-14

## 2022-04-19 RX ORDER — OMEPRAZOLE 40 MG/1
40 CAPSULE, DELAYED RELEASE ORAL
Qty: 90 | Refills: 3 | Status: ACTIVE | COMMUNITY
Start: 2020-11-23 | End: 1900-01-01

## 2022-04-26 NOTE — ASU PATIENT PROFILE, ADULT - FALL HARM RISK - CONCLUSION
Retention Suture Text: Retention sutures were placed to support the closure and prevent dehiscence. Universal Safety Interventions

## 2022-05-03 ENCOUNTER — APPOINTMENT (OUTPATIENT)
Dept: PULMONOLOGY | Facility: CLINIC | Age: 77
End: 2022-05-03

## 2022-05-23 ENCOUNTER — NON-APPOINTMENT (OUTPATIENT)
Age: 77
End: 2022-05-23

## 2022-05-24 ENCOUNTER — APPOINTMENT (OUTPATIENT)
Dept: THORACIC SURGERY | Facility: CLINIC | Age: 77
End: 2022-05-24
Payer: MEDICARE

## 2022-05-24 VITALS
WEIGHT: 120 LBS | HEART RATE: 70 BPM | OXYGEN SATURATION: 98 % | DIASTOLIC BLOOD PRESSURE: 71 MMHG | SYSTOLIC BLOOD PRESSURE: 108 MMHG | HEIGHT: 58 IN | BODY MASS INDEX: 25.19 KG/M2 | RESPIRATION RATE: 17 BRPM

## 2022-05-24 PROCEDURE — 99214 OFFICE O/P EST MOD 30 MIN: CPT

## 2022-05-24 NOTE — ASSESSMENT
[FreeTextEntry1] : Ms. HOSEA GRAY, 76 year old female, former smoker (3-4PPDx 15 years; Quit in 1980's), w/ hx of History of Johnson's esophagus;  lung cancer, status post RUL resection and RLL superior segmentectomy 2/25/2014 at [facility] (Stage I lung cancer?), crest syndrome, Has been undergoing active surveillance. Most recent imaging with New, right lung nodules. Referred to office by Dr. Valdovinos for further evaluation.\par \par Of note: July, 2020: New finding in right lower lobe possible endobronchial lesion versus mucoid impaction. MRI did not demonstrate any conclusive findings..8/2020: s/p BAL. Path negative \par \par PFTs on 3/8/22: FVC: 1.78 (84%); FEV1: 1.25 (78%); DLCO: 10.42 (59%) \par \par CT Chest on 2/28/22: ([facility])\par - Stable post op changes within the right hemithorax\par - Evolving, irregular, noncalcified semisolid RUL nodule: 9mm (Series 11, Image 134). Solid component measures 5mm. Solid component not present on CT from 9/4/15 and now solid component is more prominent with compared to CT from 8/24/21.  The overall size of the lesion has increased from 9/4/15. \par - Additional subcentimeter noncalcified solid and groundglass pulmonary nodules are redemonstrated and unchanged. \par - Tubular branching soft tissue is again demonstrate within the posterior RLL likely representing mucoid impaction, unchanged. \par - Stable, tubular branching soft tissue density is present within the periphery of the left lower lobe as well, unchanged, again, favor mucoid impaction. \par - Left adrenal myolipoma \par \par PET/CT on 3/11/22: ([facility])\par - Non - FDG avid 9 x 7 mm irregular nodule in RUL (image 33)\par - New 1.2 x 1 cm nodular opacity in the RLL at the postsurgical site (Series 4, image 26) SUV 1.3 \par - Tubular branching soft tissue in the RUL without hypermetabolic uptake, likely mucoid impaction; 4 mm Peripheral nodule in the LL (Image 48)\par - No evidence of FDG avid lymphadenopathy \par \par Patient was given a course of ABX on 04/05/2022 for 7 days. \par \par CT chest on 05/23/2022:\par - Along the superior aspect of the right lower lobe suture is a 1.2 x 1.0 cm solid nodule series 10 image 123 which is unchanged from prior PET/CT but new from 2/28/2022. This was FDG avid.\par - Clustered nodules and tubular presumed airway impaction inferior to this nodule in the right lower lobe is unchanged.\par - 9 x 7 mm part solid nodule in the right upper lobe on series 6 image 135 is unchanged since 2/28/2022 but increased in size and density since 2/2/2021.\par - Other small solid and some solid nodules are stable, annotated series 10. Airway impaction and centrilobular nodules in the basilar left lower lobe are unchanged, series 10 image 241.\par \par I have reviewed the patient's medical records and diagnostic images at time of this office consultation and have made the following recommendation:\par 1. CT chest reviewed and explained to patient, RUL nodule is suspicious, I recommended a Flexible Bronchoscopy,  Right VATS, right thoracotomy, RUL wedge resection with IR marking, possible RLL wedge resection. Risks and benefits and alternatives explained to patient, all questions answered, patient agreed to proceed with surgery.\par 2. Cardiac clearance\par 3. Will obtain OP and path report from [facility] in 2014. \par \par I personally performed the services described in the documentation, reviewed the documentation recorded by the scribe in my presence and it accurately and completely records my words and actions.\par \par I, Rissa Patel, NP, am scribing for and the presence of BREANNE Ingram, the following sections HISTORY OF PRESENT ILLNESS, PAST MEDICAL/FAMILY/SOCIAL HISTORY; REVIEW OF SYSTEMS; VITAL SIGNS; PHYSICAL EXAM; DISPOSITION.

## 2022-05-24 NOTE — HISTORY OF PRESENT ILLNESS
[FreeTextEntry1] : Ms. HOSEA GRAY, 76 year old female, former smoker (3-4PPDx 15 years; Quit in 1980's), w/ hx of History of Johnson's esophagus;  lung cancer, status post RUL resection and RLL superior segmentectomy 2/25/2014 at [facility] (Stage I lung cancer?), crest syndrome, Has been undergoing active surveillance. Most recent imaging with New, right lung nodules. Referred to office by Dr. Valdovinos for further evaluation. \par \par Of note: July, 2020: New finding in right lower lobe possible endobronchial lesion versus mucoid impaction. MRI did not demonstrate any conclusive findings..8/2020: s/p BAL. Path negative \par \par PFTs on 3/8/22: FVC: 1.78 (84%); FEV1: 1.25 (78%); DLCO: 10.42 (59%) \par \par CT Chest on 2/28/22: ([facility])\par - Stable post op changes within the right hemithorax\par - Evolving, irregular, noncalcified semisolid RUL nodule: 9mm (Series 11, Image 134). Solid component measures 5mm. Solid component not present on CT from 9/4/15 and now solid component is more prominent with compared to CT from 8/24/21.  The overall size of the lesion has increased from 9/4/15. \par - Additional subcentimeter noncalcified solid and groundglass pulmonary nodules are redemonstrated and unchanged. \par - Tubular branching soft tissue is again demonstrate within the posterior RLL likely representing mucoid impaction, unchanged. \par - Stable, tubular branching soft tissue density is present within the periphery of the left lower lobe as well, unchanged, again, favor mucoid impaction. \par - Left adrenal myolipoma \par \par PET/CT on 3/11/22: ([facility])\par - Non - FDG avid 9 x 7 mm irregular nodule in RUL (image 33)\par - New 1.2 x 1 cm nodular opacity in the RLL at the postsurgical site (Series 4, image 26) SUV 1.3 \par - Tubular branching soft tissue in the RUL without hypermetabolic uptake, likely mucoid impaction; 4 mm Peripheral nodule in the LL (Image 48)\par - No evidence of FDG avid lymphadenopathy \par \par Patient was given a course of ABX on 04/05/2022 for 7 days. \par \par CT chest on 05/23/2022:\par - Along the superior aspect of the right lower lobe suture is a 1.2 x 1.0 cm solid nodule series 10 image 123 which is unchanged from prior PET/CT but new from 2/28/2022. This was FDG avid.\par - Clustered nodules and tubular presumed airway impaction inferior to this nodule in the right lower lobe is unchanged.\par - 9 x 7 mm part solid nodule in the right upper lobe on series 6 image 135 is unchanged since 2/28/2022 but increased in size and density since 2/2/2021.\par - Other small solid and some solid nodules are stable, annotated series 10. Airway impaction and centrilobular nodules in the basilar left lower lobe are unchanged, series 10 image 241.\par \par Patient is here today for a follow up. Patient c/o dry cough, denies shortness of breath, chest pain, fever, chills.

## 2022-05-31 ENCOUNTER — OUTPATIENT (OUTPATIENT)
Dept: OUTPATIENT SERVICES | Facility: HOSPITAL | Age: 77
LOS: 1 days | End: 2022-05-31
Payer: MEDICARE

## 2022-05-31 ENCOUNTER — APPOINTMENT (OUTPATIENT)
Dept: THORACIC SURGERY | Facility: CLINIC | Age: 77
End: 2022-05-31

## 2022-05-31 VITALS
WEIGHT: 126.99 LBS | DIASTOLIC BLOOD PRESSURE: 60 MMHG | HEART RATE: 78 BPM | OXYGEN SATURATION: 98 % | SYSTOLIC BLOOD PRESSURE: 102 MMHG | HEIGHT: 58 IN | TEMPERATURE: 98 F | RESPIRATION RATE: 16 BRPM

## 2022-05-31 DIAGNOSIS — Z01.818 ENCOUNTER FOR OTHER PREPROCEDURAL EXAMINATION: ICD-10-CM

## 2022-05-31 DIAGNOSIS — C34.90 MALIGNANT NEOPLASM OF UNSPECIFIED PART OF UNSPECIFIED BRONCHUS OR LUNG: ICD-10-CM

## 2022-05-31 DIAGNOSIS — Z90.722 ACQUIRED ABSENCE OF OVARIES, BILATERAL: Chronic | ICD-10-CM

## 2022-05-31 DIAGNOSIS — R91.8 OTHER NONSPECIFIC ABNORMAL FINDING OF LUNG FIELD: ICD-10-CM

## 2022-05-31 DIAGNOSIS — I10 ESSENTIAL (PRIMARY) HYPERTENSION: ICD-10-CM

## 2022-05-31 DIAGNOSIS — Z90.2 ACQUIRED ABSENCE OF LUNG [PART OF]: Chronic | ICD-10-CM

## 2022-05-31 DIAGNOSIS — Z98.890 OTHER SPECIFIED POSTPROCEDURAL STATES: Chronic | ICD-10-CM

## 2022-05-31 LAB
ANION GAP SERPL CALC-SCNC: 10 MMOL/L — SIGNIFICANT CHANGE UP (ref 7–14)
BLD GP AB SCN SERPL QL: NEGATIVE — SIGNIFICANT CHANGE UP
BUN SERPL-MCNC: 28 MG/DL — HIGH (ref 7–23)
CALCIUM SERPL-MCNC: 9.3 MG/DL — SIGNIFICANT CHANGE UP (ref 8.4–10.5)
CHLORIDE SERPL-SCNC: 103 MMOL/L — SIGNIFICANT CHANGE UP (ref 98–107)
CO2 SERPL-SCNC: 27 MMOL/L — SIGNIFICANT CHANGE UP (ref 22–31)
CREAT SERPL-MCNC: 0.82 MG/DL — SIGNIFICANT CHANGE UP (ref 0.5–1.3)
EGFR: 74 ML/MIN/1.73M2 — SIGNIFICANT CHANGE UP
GLUCOSE SERPL-MCNC: 74 MG/DL — SIGNIFICANT CHANGE UP (ref 70–99)
HCT VFR BLD CALC: 35.2 % — SIGNIFICANT CHANGE UP (ref 34.5–45)
HGB BLD-MCNC: 12.9 G/DL — SIGNIFICANT CHANGE UP (ref 11.5–15.5)
MCHC RBC-ENTMCNC: 34 PG — SIGNIFICANT CHANGE UP (ref 27–34)
MCHC RBC-ENTMCNC: 36.6 GM/DL — HIGH (ref 32–36)
MCV RBC AUTO: 92.9 FL — SIGNIFICANT CHANGE UP (ref 80–100)
NRBC # BLD: 0 /100 WBCS — SIGNIFICANT CHANGE UP
NRBC # FLD: 0 K/UL — SIGNIFICANT CHANGE UP
PLATELET # BLD AUTO: 166 K/UL — SIGNIFICANT CHANGE UP (ref 150–400)
POTASSIUM SERPL-MCNC: 4.3 MMOL/L — SIGNIFICANT CHANGE UP (ref 3.5–5.3)
POTASSIUM SERPL-SCNC: 4.3 MMOL/L — SIGNIFICANT CHANGE UP (ref 3.5–5.3)
RBC # BLD: 3.79 M/UL — LOW (ref 3.8–5.2)
RBC # FLD: 13.7 % — SIGNIFICANT CHANGE UP (ref 10.3–14.5)
RH IG SCN BLD-IMP: POSITIVE — SIGNIFICANT CHANGE UP
SODIUM SERPL-SCNC: 140 MMOL/L — SIGNIFICANT CHANGE UP (ref 135–145)
WBC # BLD: 6.96 K/UL — SIGNIFICANT CHANGE UP (ref 3.8–10.5)
WBC # FLD AUTO: 6.96 K/UL — SIGNIFICANT CHANGE UP (ref 3.8–10.5)

## 2022-05-31 PROCEDURE — 93010 ELECTROCARDIOGRAM REPORT: CPT

## 2022-05-31 RX ORDER — ASPIRIN/CALCIUM CARB/MAGNESIUM 324 MG
1 TABLET ORAL
Qty: 0 | Refills: 0 | DISCHARGE

## 2022-05-31 RX ORDER — OMEPRAZOLE 10 MG/1
1 CAPSULE, DELAYED RELEASE ORAL
Qty: 0 | Refills: 0 | DISCHARGE

## 2022-05-31 RX ORDER — SODIUM CHLORIDE 9 MG/ML
1000 INJECTION, SOLUTION INTRAVENOUS
Refills: 0 | Status: DISCONTINUED | OUTPATIENT
Start: 2022-06-17 | End: 2022-06-21

## 2022-05-31 NOTE — H&P PST ADULT - PROBLEM SELECTOR PLAN 1
Patient tentatively scheduled for flexible bronchoscopy , right video assisted thoracoscopy, right thoracotomy with epidural, right upper possible lower lobe wedge resection with interventional radiology marking 6/15/22    Pre-op instructions provided. Pt given verbal and written instructions with teach back on chlorhexidine shampoo and pepcid. Pt verbalized understanding with return demonstration.    Pt has a scheduled preop COVID test. Patient tentatively scheduled for flexible bronchoscopy , right video assisted thoracoscopy, right thoracotomy with epidural, right upper possible lower lobe wedge resection with interventional radiology marking 6/15/22    Pre-op instructions provided. Pt given verbal and written instructions with teach back on chlorhexidine shampoo and Pepcid. Pt verbalized understanding with return demonstration.    Pt has a scheduled preop COVID test.

## 2022-05-31 NOTE — H&P PST ADULT - NSICDXPASTMEDICALHX_GEN_ALL_CORE_FT
PAST MEDICAL HISTORY:  Acid Reflux     Johnson's esophagus     CREST syndrome     Dyslipidemia     HTN (Hypertension)     Lumbar Spinal Stenosis     Lung nodules     Obesity     Ovarian Cyst

## 2022-05-31 NOTE — H&P PST ADULT - NSICDXFAMILYHX_GEN_ALL_CORE_FT
FAMILY HISTORY:  Father  Still living? Unknown  FH: CAD (coronary artery disease), Age at diagnosis: Age Unknown    Mother  Still living? No  FHx: ovarian cancer, Age at diagnosis: Age Unknown

## 2022-05-31 NOTE — H&P PST ADULT - HISTORY OF PRESENT ILLNESS
76 year old CT chest noted  maligant neoplasm of lung for flexible bronchoscopy , right video assisted thoracoscopy, right thoracotomy with epidural, right upper possible lower lobe wedge resection with interventional radiology marking 6/15/22   76 year old former smoker , had abnormal routine CT chest. now presents to PST with pre op dx of malignant neoplasm of lung. Patient is scheduled for flexible bronchoscopy , right video assisted thoracoscopy, right thoracotomy with epidural, right upper possible lower lobe wedge resection with interventional radiology marking 6/15/22   76 year old former smoker , PMH negro esophagus, HTN, HLD,  lung cancer stage 1 s/p RUL resection in 2014 ([facility]) .  had abnormal routine CT of the chest. now presents to PST with pre op dx of malignant neoplasm of lung. Patient is scheduled for flexible bronchoscopy , right video assisted thoracoscopy, right thoracotomy with epidural, right upper possible lower lobe wedge resection with interventional radiology marking on 6/15/22   76 year old former smoker , PMH negro esophagus, HTN, HLD,  lung cancer stage 1 s/p RUL resection in 2014 ([facility]) .  had abnormal routine CT of the chest. now presents to PST with pre op dx of malignant neoplasm of lung. Patient is scheduled for flexible bronchoscopy , right video assisted thoracoscopy, right thoracotomy with epidural, right upper possible lower lobe wedge resection with interventional radiology marking on 6/15/22

## 2022-05-31 NOTE — H&P PST ADULT - GASTROINTESTINAL DETAILS
soft/nontender/bowel sounds normal/no rebound tenderness/no guarding/no rigidity/no organomegaly soft/nontender/no distention/bowel sounds normal

## 2022-05-31 NOTE — H&P PST ADULT - NSICDXPASTSURGICALHX_GEN_ALL_CORE_FT
PAST SURGICAL HISTORY:  Benign Cyst of Breast left    H/O bilateral oophorectomy     S/P Arthroscopy of Right Knee      PAST SURGICAL HISTORY:  Benign Cyst of Breast left    H/O bilateral oophorectomy     History of lung surgery RUL resection in [facility] 2014    S/P Arthroscopy of Right Knee

## 2022-06-07 ENCOUNTER — APPOINTMENT (OUTPATIENT)
Dept: INTERVENTIONAL RADIOLOGY/VASCULAR | Facility: CLINIC | Age: 77
End: 2022-06-07
Payer: MEDICARE

## 2022-06-07 VITALS — HEIGHT: 58 IN | WEIGHT: 120 LBS | BODY MASS INDEX: 25.19 KG/M2

## 2022-06-07 PROCEDURE — 99203 OFFICE O/P NEW LOW 30 MIN: CPT | Mod: 95

## 2022-06-07 RX ORDER — FAMOTIDINE 20 MG/1
20 TABLET, FILM COATED ORAL DAILY
Qty: 90 | Refills: 0 | Status: COMPLETED | COMMUNITY
Start: 2021-09-20 | End: 2022-06-07

## 2022-06-07 RX ORDER — FAMOTIDINE 20 MG/1
20 TABLET, FILM COATED ORAL
Refills: 0 | Status: ACTIVE | COMMUNITY

## 2022-06-07 RX ORDER — ATORVASTATIN CALCIUM 10 MG/1
10 TABLET, FILM COATED ORAL
Refills: 0 | Status: ACTIVE | COMMUNITY

## 2022-06-07 RX ORDER — FAMOTIDINE 40 MG/1
40 TABLET, FILM COATED ORAL
Qty: 30 | Refills: 5 | Status: COMPLETED | COMMUNITY
Start: 2020-11-23 | End: 2022-06-07

## 2022-06-10 PROBLEM — M34.1 CR(E)ST SYNDROME: Chronic | Status: ACTIVE | Noted: 2022-05-31

## 2022-06-10 PROBLEM — K22.70 BARRETT'S ESOPHAGUS WITHOUT DYSPLASIA: Chronic | Status: ACTIVE | Noted: 2022-05-31

## 2022-06-13 LAB — SARS-COV-2 N GENE NPH QL NAA+PROBE: NOT DETECTED

## 2022-06-16 ENCOUNTER — TRANSCRIPTION ENCOUNTER (OUTPATIENT)
Age: 77
End: 2022-06-16

## 2022-06-16 NOTE — ASU PATIENT PROFILE, ADULT - FALL HARM RISK - UNIVERSAL INTERVENTIONS
Bed in lowest position, wheels locked, appropriate side rails in place/Call bell, personal items and telephone in reach/Instruct patient to call for assistance before getting out of bed or chair/Non-slip footwear when patient is out of bed/[place] to call system/Physically safe environment - no spills, clutter or unnecessary equipment/Purposeful Proactive Rounding/Room/bathroom lighting operational, light cord in reach

## 2022-06-17 ENCOUNTER — INPATIENT (INPATIENT)
Facility: HOSPITAL | Age: 77
LOS: 3 days | Discharge: HOME | End: 2022-06-21
Attending: THORACIC SURGERY (CARDIOTHORACIC VASCULAR SURGERY) | Admitting: THORACIC SURGERY (CARDIOTHORACIC VASCULAR SURGERY)
Payer: MEDICARE

## 2022-06-17 ENCOUNTER — TRANSCRIPTION ENCOUNTER (OUTPATIENT)
Age: 77
End: 2022-06-17

## 2022-06-17 ENCOUNTER — RESULT REVIEW (OUTPATIENT)
Age: 77
End: 2022-06-17

## 2022-06-17 ENCOUNTER — APPOINTMENT (OUTPATIENT)
Dept: THORACIC SURGERY | Facility: HOSPITAL | Age: 77
End: 2022-06-17

## 2022-06-17 VITALS
HEIGHT: 58 IN | HEART RATE: 63 BPM | TEMPERATURE: 98 F | WEIGHT: 126.99 LBS | OXYGEN SATURATION: 100 % | RESPIRATION RATE: 16 BRPM | DIASTOLIC BLOOD PRESSURE: 48 MMHG | SYSTOLIC BLOOD PRESSURE: 103 MMHG

## 2022-06-17 DIAGNOSIS — Z90.722 ACQUIRED ABSENCE OF OVARIES, BILATERAL: Chronic | ICD-10-CM

## 2022-06-17 DIAGNOSIS — R91.8 OTHER NONSPECIFIC ABNORMAL FINDING OF LUNG FIELD: ICD-10-CM

## 2022-06-17 DIAGNOSIS — Z98.890 OTHER SPECIFIED POSTPROCEDURAL STATES: Chronic | ICD-10-CM

## 2022-06-17 PROCEDURE — 88341 IMHCHEM/IMCYTCHM EA ADD ANTB: CPT | Mod: 26

## 2022-06-17 PROCEDURE — 88312 SPECIAL STAINS GROUP 1: CPT | Mod: 26

## 2022-06-17 PROCEDURE — 32667 THORACOSCOPY W/W RESECT ADDL: CPT

## 2022-06-17 PROCEDURE — 71045 X-RAY EXAM CHEST 1 VIEW: CPT | Mod: 26

## 2022-06-17 PROCEDURE — 32674 THORACOSCOPY LYMPH NODE EXC: CPT

## 2022-06-17 PROCEDURE — 88307 TISSUE EXAM BY PATHOLOGIST: CPT | Mod: 26

## 2022-06-17 PROCEDURE — 88305 TISSUE EXAM BY PATHOLOGIST: CPT | Mod: 26

## 2022-06-17 PROCEDURE — 32999 UNLISTED PX LUNGS & PLEURA: CPT | Mod: 26

## 2022-06-17 PROCEDURE — 32666 THORACOSCOPY W/WEDGE RESECT: CPT | Mod: RT

## 2022-06-17 PROCEDURE — 77012 CT SCAN FOR NEEDLE BIOPSY: CPT | Mod: 26

## 2022-06-17 PROCEDURE — 99233 SBSQ HOSP IP/OBS HIGH 50: CPT

## 2022-06-17 PROCEDURE — 88342 IMHCHEM/IMCYTCHM 1ST ANTB: CPT | Mod: 26

## 2022-06-17 DEVICE — STAPLER ETHICON GST ECHELON 45MM GOLD RELOAD: Type: IMPLANTABLE DEVICE | Status: FUNCTIONAL

## 2022-06-17 DEVICE — STAPLER ETHICON ECHELON ENDOPATH GRIP SURFACE 45MM BLACK RELOAD: Type: IMPLANTABLE DEVICE | Status: FUNCTIONAL

## 2022-06-17 DEVICE — CHEST DRAIN THORACIC ARGYLE PVC 20FR STRAIGHT: Type: IMPLANTABLE DEVICE | Status: FUNCTIONAL

## 2022-06-17 DEVICE — LIGATING CLIPS WECK HORIZON LARGE (ORANGE) 24: Type: IMPLANTABLE DEVICE | Status: FUNCTIONAL

## 2022-06-17 DEVICE — PROGEL PLEURAL AIR LEAK 4ML: Type: IMPLANTABLE DEVICE | Status: FUNCTIONAL

## 2022-06-17 DEVICE — LIGATING CLIPS WECK HORIZON SMALL-WIDE (RED) 24: Type: IMPLANTABLE DEVICE | Status: FUNCTIONAL

## 2022-06-17 DEVICE — BONE WAX 2.5GM: Type: IMPLANTABLE DEVICE | Status: FUNCTIONAL

## 2022-06-17 DEVICE — STAPLER ETHICON GST ECHELON 45MM GREEN RELOAD: Type: IMPLANTABLE DEVICE | Status: FUNCTIONAL

## 2022-06-17 DEVICE — LIGATING CLIPS WECK HORIZON MEDIUM (BLUE) 24: Type: IMPLANTABLE DEVICE | Status: FUNCTIONAL

## 2022-06-17 RX ORDER — DIPHENHYDRAMINE HCL 50 MG
12.5 CAPSULE ORAL EVERY 4 HOURS
Refills: 0 | Status: DISCONTINUED | OUTPATIENT
Start: 2022-06-17 | End: 2022-06-20

## 2022-06-17 RX ORDER — ACETAMINOPHEN 500 MG
975 TABLET ORAL ONCE
Refills: 0 | Status: COMPLETED | OUTPATIENT
Start: 2022-06-17 | End: 2022-06-17

## 2022-06-17 RX ORDER — ONDANSETRON 8 MG/1
4 TABLET, FILM COATED ORAL EVERY 6 HOURS
Refills: 0 | Status: DISCONTINUED | OUTPATIENT
Start: 2022-06-17 | End: 2022-06-21

## 2022-06-17 RX ORDER — HYDROMORPHONE HYDROCHLORIDE 2 MG/ML
250 INJECTION INTRAMUSCULAR; INTRAVENOUS; SUBCUTANEOUS
Refills: 0 | Status: DISCONTINUED | OUTPATIENT
Start: 2022-06-17 | End: 2022-06-20

## 2022-06-17 RX ORDER — ATORVASTATIN CALCIUM 80 MG/1
10 TABLET, FILM COATED ORAL AT BEDTIME
Refills: 0 | Status: DISCONTINUED | OUTPATIENT
Start: 2022-06-17 | End: 2022-06-21

## 2022-06-17 RX ORDER — GABAPENTIN 400 MG/1
100 CAPSULE ORAL ONCE
Refills: 0 | Status: COMPLETED | OUTPATIENT
Start: 2022-06-17 | End: 2022-06-17

## 2022-06-17 RX ORDER — POLYETHYLENE GLYCOL 3350 17 G/17G
17 POWDER, FOR SOLUTION ORAL DAILY
Refills: 0 | Status: DISCONTINUED | OUTPATIENT
Start: 2022-06-17 | End: 2022-06-21

## 2022-06-17 RX ORDER — DORNASE ALFA 1 MG/ML
2.5 SOLUTION RESPIRATORY (INHALATION) DAILY
Refills: 0 | Status: DISCONTINUED | OUTPATIENT
Start: 2022-06-17 | End: 2022-06-21

## 2022-06-17 RX ORDER — ACETAMINOPHEN 500 MG
1000 TABLET ORAL ONCE
Refills: 0 | Status: COMPLETED | OUTPATIENT
Start: 2022-06-17 | End: 2022-06-19

## 2022-06-17 RX ORDER — HYDROMORPHONE HYDROCHLORIDE 2 MG/ML
0.5 INJECTION INTRAMUSCULAR; INTRAVENOUS; SUBCUTANEOUS
Refills: 0 | Status: DISCONTINUED | OUTPATIENT
Start: 2022-06-17 | End: 2022-06-18

## 2022-06-17 RX ORDER — PANTOPRAZOLE SODIUM 20 MG/1
40 TABLET, DELAYED RELEASE ORAL
Refills: 0 | Status: DISCONTINUED | OUTPATIENT
Start: 2022-06-17 | End: 2022-06-21

## 2022-06-17 RX ORDER — HEPARIN SODIUM 5000 [USP'U]/ML
5000 INJECTION INTRAVENOUS; SUBCUTANEOUS EVERY 12 HOURS
Refills: 0 | Status: DISCONTINUED | OUTPATIENT
Start: 2022-06-17 | End: 2022-06-21

## 2022-06-17 RX ORDER — IPRATROPIUM/ALBUTEROL SULFATE 18-103MCG
3 AEROSOL WITH ADAPTER (GRAM) INHALATION EVERY 6 HOURS
Refills: 0 | Status: DISCONTINUED | OUTPATIENT
Start: 2022-06-17 | End: 2022-06-21

## 2022-06-17 RX ORDER — NALOXONE HYDROCHLORIDE 4 MG/.1ML
0.1 SPRAY NASAL
Refills: 0 | Status: DISCONTINUED | OUTPATIENT
Start: 2022-06-17 | End: 2022-06-21

## 2022-06-17 RX ORDER — SODIUM CHLORIDE 9 MG/ML
250 INJECTION, SOLUTION INTRAVENOUS ONCE
Refills: 0 | Status: COMPLETED | OUTPATIENT
Start: 2022-06-17 | End: 2022-06-17

## 2022-06-17 RX ADMIN — ATORVASTATIN CALCIUM 10 MILLIGRAM(S): 80 TABLET, FILM COATED ORAL at 22:16

## 2022-06-17 RX ADMIN — SODIUM CHLORIDE 1000 MILLILITER(S): 9 INJECTION, SOLUTION INTRAVENOUS at 22:53

## 2022-06-17 RX ADMIN — Medication 975 MILLIGRAM(S): at 10:30

## 2022-06-17 RX ADMIN — Medication 3 MILLILITER(S): at 22:45

## 2022-06-17 RX ADMIN — GABAPENTIN 100 MILLIGRAM(S): 400 CAPSULE ORAL at 10:31

## 2022-06-17 RX ADMIN — Medication 975 MILLIGRAM(S): at 11:00

## 2022-06-17 RX ADMIN — HEPARIN SODIUM 5000 UNIT(S): 5000 INJECTION INTRAVENOUS; SUBCUTANEOUS at 22:17

## 2022-06-17 RX ADMIN — HYDROMORPHONE HYDROCHLORIDE 250 MILLILITER(S): 2 INJECTION INTRAMUSCULAR; INTRAVENOUS; SUBCUTANEOUS at 18:38

## 2022-06-17 NOTE — PATIENT PROFILE ADULT - FUNCTIONAL ASSESSMENT - DAILY ACTIVITY 2.
4 = No assist / stand by assistance [FreeTextEntry1] : EXTENDED NEUROBEHAVIORAL STATUS TESTING\par \par [This is a separate procedure note for the Neurobehavioral Status Examination performed during this encounter.]\par \par Ms. WAGGONER was awake and alert, well groomed, and in no acute distress. She had fluent speech and no paraphasic errors. There was no anomia in conversation. Comprehension was decreased. She was not engaged in the discussion, deferring to her daughter to recount her history. She appeared irritable, but not anxious or depressed.\par \par Recent memory: Ms. WAGGONER cannot name the president (the last president who she can remember is "Conradenhower"). She cannot say what she did yesterday (she states that she argued with her daughter, but that happened earlier in the week according to her daughter).\par \par MoCA (Version 7.1) score out of 30: 12\par \par Memory Index Score (MIS) out of 15: 2\par \par Visuospatial/Executive\par           Trails: (-1) Connects 3 to both 4 and C, and stops line drawing at D\par           Cube: (-1) 2-dimensional figure\par           Clock: (-1) Hands are drawn peripherally\par \par Naming: Intact\par \par Memory (Registration): 1/5 on both trials (incorrectly states "Velvet" as "Bulb" twice despite being corrected)\par \par Attention:\par           Digit span 5 Forward: Intact\par           Digit span 3 Reverse: (-1) "2 7 4"\par           Letter A test: (-1) 2 errors\par           Serial 7 subtraction: (-1)  93 --> 64 --> 56 --> 43 --> 36\par \par Language:\par           Phrase repetition: (-1) Makes paraphasic errors with the second sentence only\par           Word fluency (F): 4 words\par \par Abstraction:\par           Train/Bicycle: "Both riding things"\par           Watch/Ruler: (-1) "They can tell time or they can measure"\par \par Delayed recall score out of 5: (-5) 0 words\par           Additional words with category cue: 1 word (recalls 1 word incorrectly with category cue)\par           Additional words with multiple choice cue: 0 words (states "No" or "I don’t' remember" when presented with multiple choices for the remaining 4 words)\par \par Orientation: (-4) Does not know date or month. States day is Wednesday instead of Friday. States [facility] is [place] instead of [place] or [facility].\par \par \par Additional Neurobehavioral status tests:\par \par Animal naming fluency: 6 words (repeats 2 words; pauses for 28 seconds between the 5th and 6th word)\par \par Praxis:\par \par Ideomotor limb\par Transitive:\par           Brush teeth: Intact b/l\par           Comb hair: Intact b/l\par Intransitive:\par           Wave goodbye: Intact b/l\par           Motion "come here": Intact b/l\par Meaningless gestures: Intact to 4/4\par \par Right/Left Orientation:\par           "Show me your right hand": Correct\par           "Show me your left hand": Correct\par           "With your left hand, point to your left ear": Correct\par           "With your right hand, point to your left shoulder": Correct\par           "With your left hand, point to my left ear": Incorrect (points to my right ear) \par           " With your right hand, point to my left shoulder": Correct\par \par INTERPRETATION:\par \par I have carefully reviewed the above neurobehavioral status testing results. The cognitive domains are listed below, as well as my interpretation of whether or not there is an impairment or if performance was within normal limits. This differs from standard neuropsychological testing, since the raw scores alone on different validated batteries of tests may not detect or may overestimate subtle deficits.\par \par Cognitive domains:\par           Attention:Decreased\par           Working Memory: Decreased\par           Executive Function: Decreased\par           Language: Phonemic and Semantic Fluency Decreased\par           Memory: Decreased\par           Visuospatial Function: Decreased\par           Praxis: Slightly Decreased\par           Behavior/Mood: Normal behavior, Irritable mood\par           Other comments: Patient has history of multiple transient ischemic attacks\par \par 60 minutes were spent administering and interpreting the extended neurobehavioral status testing, as well as preparing this report.\par \par Testing start time: 10:30 am\par Testing end time: 11:00 am\par Report time: 30 minutes\par \par

## 2022-06-17 NOTE — BRIEF OPERATIVE NOTE - OPERATION/FINDINGS
extensive adhesions, RUL/RLL nodule Pt is now s/p FB, Right uniportal VATS, extensive DAMARI, RUL wedge, RLL wedge x 2, MLND. RUL wedge positive for adeno. RLL wedge was granuloma. Case was uncomplicated.

## 2022-06-17 NOTE — BRIEF OPERATIVE NOTE - NSICDXBRIEFPROCEDURE_GEN_ALL_CORE_FT
PROCEDURES:  VATS, with flexible bronchoscopy 17-Jun-2022 16:34:55 FB, Redo RVATS, Pneumonolysis, RLL/RUL wedge Alivia Dangelo

## 2022-06-17 NOTE — PROGRESS NOTE ADULT - SUBJECTIVE AND OBJECTIVE BOX
HOSEA GRAY                     [id number]    HPI:  76 year old former smoker , PMH negro esophagus, HTN, HLD,  lung cancer stage 1 s/p RUL resection in 2014 ([facility]) .  had abnormal routine CT of the chest. now presents to [facility] with pre op dx of malignant neoplasm of lung. Patient is scheduled for flexible bronchoscopy , right video assisted thoracoscopy, right thoracotomy with epidural, right upper possible lower lobe wedge resection with interventional radiology marking on 6/15/22   (31 May 2022 09:56)      Procedure:  FB, Redo RVATS, Pneumonolysis, RLL/RUL wedge    Issues:   lung cancer  negro esophagus  HTN,   HLD,   post op pain      PAST MEDICAL & SURGICAL HISTORY:  HTN (Hypertension)      Dyslipidemia      Lumbar Spinal Stenosis      Acid Reflux      Ovarian Cyst      Obesity      Lung nodules      Negro&#x27;s esophagus      CREST syndrome      S/P Arthroscopy of Right Knee      Benign Cyst of Breast  left      H/O bilateral oophorectomy      History of lung surgery  RUL resection in [facility] 2014                VITAL SIGNS:  Vital Signs Last 24 Hrs  T(C): 36.1 (17 Jun 2022 09:57), Max: 36.6 (17 Jun 2022 07:49)  T(F): 96.9 (17 Jun 2022 09:57), Max: 97.9 (17 Jun 2022 07:49)  HR: 55 (17 Jun 2022 11:25) (53 - 63)  BP: 99/49 (17 Jun 2022 11:25) (98/49 - 103/48)  BP(mean): 61 (17 Jun 2022 11:25) (61 - 61)  RR: 16 (17 Jun 2022 11:25) (14 - 16)  SpO2: 100% (17 Jun 2022 11:25) (100% - 100%)    I/Os:   I&O's Detail      CAPILLARY BLOOD GLUCOSE          =======================MEDICATIONS===================  MEDICATIONS  (STANDING):  atorvastatin 10 milliGRAM(s) Oral at bedtime  hydromorphone (10 MICROgram(s)/mL) + bupivacaine 0.0625% in 0.9% Sodium Chloride PCEA 250 milliLiter(s) Epidural PCA Continuous  lactated ringers. 1000 milliLiter(s) (30 mL/Hr) IV Continuous <Continuous>  pantoprazole    Tablet 40 milliGRAM(s) Oral before breakfast  polyethylene glycol 3350 17 Gram(s) Oral daily    MEDICATIONS  (PRN):  diphenhydrAMINE Injectable 12.5 milliGRAM(s) IV Push every 4 hours PRN Pruritus  HYDROmorphone  Injectable 0.5 milliGRAM(s) IV Push every 3 hours PRN Severe Pain (7 - 10)  hydromorphone (10 MICROgram(s)/mL) + bupivacaine 0.0625% in 0.9% Sodium Chloride PCEA Rescue Clinician  Bolus 5 milliLiter(s) Epidural every 15 minutes PRN for Pain Score greater than 6  naloxone Injectable 0.1 milliGRAM(s) IV Push every 3 minutes PRN For ANY of the following changes in patient status:  A. RR LESS THAN 10 breaths per minute, B. Oxygen saturation LESS THAN 90%, C. Sedation score of 6  ondansetron Injectable 4 milliGRAM(s) IV Push every 6 hours PRN Nausea    PHYSICAL EXAM============================  General:                         Awake, alert, not in any distress  Neuro:                            Moving all extremities to commands.   Respiratory:	Air entry fair and  bilateral conducted sounds                                           Effort even and unlabored.  CV:		Regular rate and rhythm. Normal S1/S2                                          Distal pulses present.  Abdomen:	                     Soft, non-distended. Bowel sounds present   Skin:		No rash.  Extremities:	Warm, no cyanosis or edema.  Palpable pulses      A/P:  =============================NEUROLOGY============================  Pain control with PCA /  Tylenol IV     ==============================RESPIRATORY========================  Pt is on  3  L nasal canula   Comfortable, not in any distress.  Using incentive spirometry   Monitor chest tube output  Chest tube to water seal	  Continue bronchodilators, pulmonary toilet    ============================CARDIOVASCULAR======================  Continue hemodynamic monitoring.  Not on any pressors  HTN: restart home meds when inducated  =====================RENAL===================  Continue LR 30CC/hr    Monitor I/Os and electrolytes    ====================GASTROINTESTINAL===================  On clears, tolerating  Continue GI prophylaxis with  Protonix  Continue Zofran / Reglan for nausea - PRN	    ========================HEMATOLOGIC/ONCOLOGIC====================  Monitor chest tube output. No signs of active bleeding.   Follow CBC in AM    ============================INFECTIOUS DISEASE========================  Monitor for fever / leukocytosis.  All surgical incision / chest tube  sites look clean      Pt is on GI & DVT prophylaxis  OOB & ambulate       Pertinent clinical, laboratory, radiographic, hemodynamic, echocardiographic, respiratory data, microbiologic data and chart were reviewed and analyzed frequently throughout the course of the day and night  Patient seen, examined and plan discussed with CT Surgery / CTICU team during rounds.    Pt's status discussed with family at bedside, updated status        Ha Monsivais DO FACEP

## 2022-06-17 NOTE — PATIENT PROFILE ADULT - FALL HARM RISK - UNIVERSAL INTERVENTIONS
Bed in lowest position, wheels locked, appropriate side rails in place/Call bell, personal items and telephone in reach/Instruct patient to call for assistance before getting out of bed or chair/Non-slip footwear when patient is out of bed/[place] to call system/Physically safe environment - no spills, clutter or unnecessary equipment/Purposeful Proactive Rounding/Room/bathroom lighting operational, light cord in reach

## 2022-06-18 LAB
ANION GAP SERPL CALC-SCNC: 12 MMOL/L — SIGNIFICANT CHANGE UP (ref 7–14)
APTT BLD: 32 SEC — SIGNIFICANT CHANGE UP (ref 27–36.3)
BUN SERPL-MCNC: 18 MG/DL — SIGNIFICANT CHANGE UP (ref 7–23)
CALCIUM SERPL-MCNC: 8 MG/DL — LOW (ref 8.4–10.5)
CHLORIDE SERPL-SCNC: 105 MMOL/L — SIGNIFICANT CHANGE UP (ref 98–107)
CO2 SERPL-SCNC: 21 MMOL/L — LOW (ref 22–31)
CREAT SERPL-MCNC: 0.73 MG/DL — SIGNIFICANT CHANGE UP (ref 0.5–1.3)
EGFR: 85 ML/MIN/1.73M2 — SIGNIFICANT CHANGE UP
GLUCOSE SERPL-MCNC: 142 MG/DL — HIGH (ref 70–99)
HCT VFR BLD CALC: 35.1 % — SIGNIFICANT CHANGE UP (ref 34.5–45)
HGB BLD-MCNC: 12.1 G/DL — SIGNIFICANT CHANGE UP (ref 11.5–15.5)
INR BLD: 1.07 RATIO — SIGNIFICANT CHANGE UP (ref 0.88–1.16)
MAGNESIUM SERPL-MCNC: 1.6 MG/DL — SIGNIFICANT CHANGE UP (ref 1.6–2.6)
MCHC RBC-ENTMCNC: 31.1 PG — SIGNIFICANT CHANGE UP (ref 27–34)
MCHC RBC-ENTMCNC: 34.5 GM/DL — SIGNIFICANT CHANGE UP (ref 32–36)
MCV RBC AUTO: 90.2 FL — SIGNIFICANT CHANGE UP (ref 80–100)
NRBC # BLD: 0 /100 WBCS — SIGNIFICANT CHANGE UP
NRBC # FLD: 0 K/UL — SIGNIFICANT CHANGE UP
PHOSPHATE SERPL-MCNC: 4.1 MG/DL — SIGNIFICANT CHANGE UP (ref 2.5–4.5)
PLATELET # BLD AUTO: 181 K/UL — SIGNIFICANT CHANGE UP (ref 150–400)
POTASSIUM SERPL-MCNC: 4 MMOL/L — SIGNIFICANT CHANGE UP (ref 3.5–5.3)
POTASSIUM SERPL-SCNC: 4 MMOL/L — SIGNIFICANT CHANGE UP (ref 3.5–5.3)
PROTHROM AB SERPL-ACNC: 12.4 SEC — SIGNIFICANT CHANGE UP (ref 10.5–13.4)
RBC # BLD: 3.89 M/UL — SIGNIFICANT CHANGE UP (ref 3.8–5.2)
RBC # FLD: 13.7 % — SIGNIFICANT CHANGE UP (ref 10.3–14.5)
SODIUM SERPL-SCNC: 138 MMOL/L — SIGNIFICANT CHANGE UP (ref 135–145)
WBC # BLD: 9.98 K/UL — SIGNIFICANT CHANGE UP (ref 3.8–10.5)
WBC # FLD AUTO: 9.98 K/UL — SIGNIFICANT CHANGE UP (ref 3.8–10.5)

## 2022-06-18 PROCEDURE — 99233 SBSQ HOSP IP/OBS HIGH 50: CPT

## 2022-06-18 PROCEDURE — 71045 X-RAY EXAM CHEST 1 VIEW: CPT | Mod: 26

## 2022-06-18 RX ORDER — PHENYLEPHRINE HYDROCHLORIDE 10 MG/ML
0.4 INJECTION INTRAVENOUS
Qty: 40 | Refills: 0 | Status: DISCONTINUED | OUTPATIENT
Start: 2022-06-18 | End: 2022-06-21

## 2022-06-18 RX ORDER — HYDROMORPHONE HYDROCHLORIDE 2 MG/ML
0.25 INJECTION INTRAMUSCULAR; INTRAVENOUS; SUBCUTANEOUS
Refills: 0 | Status: DISCONTINUED | OUTPATIENT
Start: 2022-06-18 | End: 2022-06-21

## 2022-06-18 RX ORDER — MAGNESIUM SULFATE 500 MG/ML
2 VIAL (ML) INJECTION ONCE
Refills: 0 | Status: COMPLETED | OUTPATIENT
Start: 2022-06-18 | End: 2022-06-18

## 2022-06-18 RX ORDER — SODIUM CHLORIDE 9 MG/ML
250 INJECTION, SOLUTION INTRAVENOUS ONCE
Refills: 0 | Status: COMPLETED | OUTPATIENT
Start: 2022-06-18 | End: 2022-06-18

## 2022-06-18 RX ADMIN — SODIUM CHLORIDE 30 MILLILITER(S): 9 INJECTION, SOLUTION INTRAVENOUS at 21:13

## 2022-06-18 RX ADMIN — Medication 25 GRAM(S): at 06:33

## 2022-06-18 RX ADMIN — Medication 3 MILLILITER(S): at 22:42

## 2022-06-18 RX ADMIN — SODIUM CHLORIDE 1000 MILLILITER(S): 9 INJECTION, SOLUTION INTRAVENOUS at 04:00

## 2022-06-18 RX ADMIN — DORNASE ALFA 2.5 MILLIGRAM(S): 1 SOLUTION RESPIRATORY (INHALATION) at 09:22

## 2022-06-18 RX ADMIN — POLYETHYLENE GLYCOL 3350 17 GRAM(S): 17 POWDER, FOR SOLUTION ORAL at 16:36

## 2022-06-18 RX ADMIN — HEPARIN SODIUM 5000 UNIT(S): 5000 INJECTION INTRAVENOUS; SUBCUTANEOUS at 06:18

## 2022-06-18 RX ADMIN — HYDROMORPHONE HYDROCHLORIDE 250 MILLILITER(S): 2 INJECTION INTRAMUSCULAR; INTRAVENOUS; SUBCUTANEOUS at 19:17

## 2022-06-18 RX ADMIN — ONDANSETRON 4 MILLIGRAM(S): 8 TABLET, FILM COATED ORAL at 16:36

## 2022-06-18 RX ADMIN — Medication 3 MILLILITER(S): at 17:03

## 2022-06-18 RX ADMIN — PANTOPRAZOLE SODIUM 40 MILLIGRAM(S): 20 TABLET, DELAYED RELEASE ORAL at 16:35

## 2022-06-18 RX ADMIN — ATORVASTATIN CALCIUM 10 MILLIGRAM(S): 80 TABLET, FILM COATED ORAL at 21:14

## 2022-06-18 RX ADMIN — Medication 3 MILLILITER(S): at 04:03

## 2022-06-18 RX ADMIN — PHENYLEPHRINE HYDROCHLORIDE 8.64 MICROGRAM(S)/KG/MIN: 10 INJECTION INTRAVENOUS at 21:14

## 2022-06-18 RX ADMIN — Medication 3 MILLILITER(S): at 09:17

## 2022-06-18 RX ADMIN — HEPARIN SODIUM 5000 UNIT(S): 5000 INJECTION INTRAVENOUS; SUBCUTANEOUS at 16:53

## 2022-06-18 NOTE — PROGRESS NOTE ADULT - SUBJECTIVE AND OBJECTIVE BOX
HOSEA GRAY      76y   Female   [id number]         No Known Allergies             Daily     Daily Drug Dosing Weight  Height (cm): 147.3 (17 Jun 2022 07:49)  Weight (kg): 57.6 (17 Jun 2022 07:49)  BMI (kg/m2): 26.5 (17 Jun 2022 07:49)  BSA (m2): 1.5 (17 Jun 2022 07:49)    HPI:  76 year old former smoker , PMH negro esophagus, HTN, HLD,  lung cancer stage 1 s/p RUL resection in 2014 ([facility]) .  had abnormal routine CT of the chest. now presents to [facility] with pre op dx of malignant neoplasm of lung. Patient is scheduled for flexible bronchoscopy , right video assisted thoracoscopy, right thoracotomy with epidural, right upper possible lower lobe wedge resection with interventional radiology marking on 6/15/22   (31 May 2022 09:56)    Procedure: FB, Redo RVATS, Pneumonolysis, RLL/RUL wedge                       Issues:              Hypotension without shock requiring pressors  Lung nodule              Postop pain              Chest tube in place  Negro esophagus / GERD  HLD,       Postop course:     Patient reports moderate pain at chest wall incision sites which is worse with coughing and deep breathing without associated fever or dyspnea. Pain is improved with use of PCA and  oral pain meds.         Home Medications:  amlodipine-benazepril 5 mg-10 mg oral capsule: 1 cap(s) orally once a day (17 Jun 2022 08:13)  Calcium 600+D oral tablet: 1 tab(s) orally 1 times a day (17 Jun 2022 08:13)  famotidine 40 mg oral tablet: 1 tab(s) orally once a day (at bedtime) (17 Jun 2022 08:13)  Lipitor 10 mg oral tablet: 1 tab(s) orally once a day (17 Jun 2022 08:13)  omeprazole 40 mg oral delayed release capsule: 1 cap(s) orally once a day (17 Jun 2022 08:13)  Vitamin B-12 1000 mcg oral tablet: 1 tab(s) orally once a day (17 Jun 2022 08:13)  Vitamin D3 1000 intl units (25 mcg) oral capsule:  (17 Jun 2022 08:13)    PAST MEDICAL & SURGICAL HISTORY:  HTN (Hypertension)    Dyslipidemia    Lumbar Spinal Stenosis    Acid Reflux    Ovarian Cyst    Obesity    Lung nodules    Negro&#x27;s esophagus      CREST syndrome    S/P Arthroscopy of Right Knee    Benign Cyst of Breast  left    H/O bilateral oophorectomy    History of lung surgery  RUL resection in [facility] 2014      Vital Signs Last 24 Hrs  T(C): 37.3 (18 Jun 2022 08:00), Max: 37.3 (18 Jun 2022 08:00)  T(F): 99.1 (18 Jun 2022 08:00), Max: 99.1 (18 Jun 2022 08:00)  HR: 62 (18 Jun 2022 09:17) (53 - 78)  BP: 122/47 (18 Jun 2022 09:00) (93/81 - 122/47)  BP(mean): 68 (18 Jun 2022 09:00) (61 - 87)  RR: 18 (18 Jun 2022 09:00) (11 - 29)  SpO2: 100% (18 Jun 2022 09:17) (95% - 100%)  I&O's Detail    17 Jun 2022 07:01  -  18 Jun 2022 07:00  --------------------------------------------------------  IN:    IV PiggyBack: 550 mL    Lactated Ringers: 420 mL    Oral Fluid: 240 mL    Phenylephrine: 62.9 mL  Total IN: 1272.9 mL    OUT:    Chest Tube (mL): 270 mL    Indwelling Catheter - Urethral (mL): 455 mL    Voided (mL): 180 mL  Total OUT: 905 mL    Total NET: 367.9 mL      18 Jun 2022 07:01  -  18 Jun 2022 10:09  --------------------------------------------------------  IN:  Total IN: 0 mL    OUT:    Chest Tube (mL): 20 mL    Indwelling Catheter - Urethral (mL): 25 mL  Total OUT: 45 mL    Total NET: -45 mL      CAPILLARY BLOOD GLUCOSE      Home Medications:  amlodipine-benazepril 5 mg-10 mg oral capsule: 1 cap(s) orally once a day (17 Jun 2022 08:13)  Calcium 600+D oral tablet: 1 tab(s) orally 1 times a day (17 Jun 2022 08:13)  famotidine 40 mg oral tablet: 1 tab(s) orally once a day (at bedtime) (17 Jun 2022 08:13)  Lipitor 10 mg oral tablet: 1 tab(s) orally once a day (17 Jun 2022 08:13)  omeprazole 40 mg oral delayed release capsule: 1 cap(s) orally once a day (17 Jun 2022 08:13)  Vitamin B-12 1000 mcg oral tablet: 1 tab(s) orally once a day (17 Jun 2022 08:13)  Vitamin D3 1000 intl units (25 mcg) oral capsule:  (17 Jun 2022 08:13)    MEDICATIONS  (STANDING):  acetaminophen   IVPB .. 1000 milliGRAM(s) IV Intermittent once  albuterol/ipratropium for Nebulization 3 milliLiter(s) Nebulizer every 6 hours  atorvastatin 10 milliGRAM(s) Oral at bedtime  dornase little Solution 2.5 milliGRAM(s) Inhalation daily  heparin   Injectable 5000 Unit(s) SubCutaneous every 12 hours  hydromorphone (10 MICROgram(s)/mL) + bupivacaine 0.0625% in 0.9% Sodium Chloride PCEA 250 milliLiter(s) Epidural PCA Continuous  lactated ringers. 1000 milliLiter(s) (30 mL/Hr) IV Continuous <Continuous>  pantoprazole    Tablet 40 milliGRAM(s) Oral before breakfast  phenylephrine    Infusion 0.4 MICROgram(s)/kG/Min (8.64 mL/Hr) IV Continuous <Continuous>  polyethylene glycol 3350 17 Gram(s) Oral daily    MEDICATIONS  (PRN):  diphenhydrAMINE Injectable 12.5 milliGRAM(s) IV Push every 4 hours PRN Pruritus  HYDROmorphone  Injectable 0.25 milliGRAM(s) IV Push every 3 hours PRN Severe Breakthrough Pain (7 - 10)  hydromorphone (10 MICROgram(s)/mL) + bupivacaine 0.0625% in 0.9% Sodium Chloride PCEA Rescue Clinician  Bolus 5 milliLiter(s) Epidural every 15 minutes PRN for Pain Score greater than 6  naloxone Injectable 0.1 milliGRAM(s) IV Push every 3 minutes PRN For ANY of the following changes in patient status:  A. RR LESS THAN 10 breaths per minute, B. Oxygen saturation LESS THAN 90%, C. Sedation score of 6  ondansetron Injectable 4 milliGRAM(s) IV Push every 6 hours PRN Nausea      Physical exam:                             General:               Pt is awake, alert,  appears to be in pain but not in distress                              Eyes:                     Sclera white, Conjunctiva normal, Eyelids normal, Pupils symmetrical and reactive                                               Neuro:                  Nonfocal                             Psych:                   A&Ox3                          Cardiovascular:   S1 & S2, regular                           Respiratory:         Air entry is fair and equal on both sides, has bilateral conducted sounds                           GI:                          Soft, nondistended and nontender, Bowel sounds active                            Ext:                        No cyanosis or edema     Labs:                                                                           12.1   9.98  )-----------( 181      ( 18 Jun 2022 02:40 )             35.1             06-18    138  |  105  |  18  ----------------------------<  142<H>  4.0   |  21<L>  |  0.73    Ca    8.0<L>      18 Jun 2022 02:40  Phos  4.1     06-18  Mg     1.60     06-18                    PT/INR - ( 18 Jun 2022 02:40 )   PT: 12.4 sec;   INR: 1.07 ratio         PTT - ( 18 Jun 2022 02:40 )  PTT:32.0 sec          CXR:    < from: Xray Chest 1 View- PORTABLE-Urgent (Xray Chest 1 View- PORTABLE-Urgent .) (06.17.22 @ 18:33) >  INTERPRETATION:  postop R ptx and sub Q emph. with R chest tube, consider   repeat imaging if patient's symptoms persist, change, or worsen.      Plan:  General: 76yFemale s/p FB, Redo RVATS, Pneumonolysis, RLL/RUL wedge , experiencing  pain with deep breathing.                             Neuro:                                         Pain control with  PCEA /  Tylenol PRN                            Cardiovascular:                                          Telemetry (medical test) - Reviewed by me today independently. Normal sinus rhythm.                                          Continue hemodynamic monitoring to prevent decompensation.    Hypotension without shock requiring pressors: Hypotension probably due to vasodilation from PCEA  Titrate Oneil to MAP>65                                Respiratory:                                         Postop hypoxemia requiring O2 via nasal cannula probably due to postop pain - Wean nasal cannula for goal O2sat above 92%.                                              Obtain CXR . Encourage incentive spirometry.                                                   Chest PT and frequent suctioning. Continue bronchodilators, Pulmozyme and inhaled 3% saline inhalations.                                                      OOB to chair & ambulate w/ assistance.                                                           Continuous pulse oximetry for support & to prevent decompensation.                                         Monitor chest tube output                                         Chest tube to water seal                                                                                            GI                                         On  DASH  diet as tolerated                                         Continue Zofran / Reglan for nausea - PRN                                         Continue bowel regimen	                                                                 Renal:                                         Continue LR  30cc/hr                                         Monitor I/Os and electrolytes                                                                                        Hem/ Onc:                                         DVT prophylaxis with SQ Heparin and SCDs.                                         Monitor chest tube output &  signs of bleeding.                                          Follow CBC in AM                           Infectious disease:                                            Monitor for fever / leukocytosis.                                          All surgical incision / chest tube  sites look clean                            Endocrine                                            Continue Accu-Checks with coverage                                               Pertinent clinical, laboratory, radiographic, hemodynamic, echocardiographic, respiratory data, microbiologic data and chart were reviewed and analyzed frequently throughout the course of the day and night.     Patient seen, examined and plan discussed with CT Surgeon Dr. Underwood / CTICU team during rounds.    OOB to chair and ambulate as tolerated.     Status discussed with patient and pt's daughter at bedside updated plan of care.     I have spent  40  minutes of critical care time with this pt between  7am and 11.59pm monitoring hemodynamic status, managing fluid resuscitation /  pressors to prevent shock.           Antoine Abarca MD

## 2022-06-18 NOTE — PROGRESS NOTE ADULT - SUBJECTIVE AND OBJECTIVE BOX
Anesthesia Pain Management Service: Post op Day __1 of Epidural    SUBJECTIVE: Patient doing well with PCEA and no problems. Denies headache, numbness and tingling.  Pain Scale Score: 0/10  Refer to charted pain scores    THERAPY:  [x ] Epidural Bupivacaine 0.0625% and Hydromorphone  		[ X] 10 micrograms/mL	[ ] 5 micrograms/mL  [ ] Epidural Bupivacaine 0.0625% and Fentanyl - 2 micrograms/mL  [ ] Epidural Ropivacaine 0.1% plain – 1 mg/mL  [ ] Patient Controlled Regional Anesthesia (PCRA) Ropivacaine  		[ ] 0.2%			[ ] 0.1%    Demand dose __3_ lockout __15_ (minutes) Continuous Rate __2_ Total: _49.15___ ml used (in past 24 hours)      MEDICATIONS  (STANDING):  acetaminophen   IVPB .. 1000 milliGRAM(s) IV Intermittent once  albuterol/ipratropium for Nebulization 3 milliLiter(s) Nebulizer every 6 hours  atorvastatin 10 milliGRAM(s) Oral at bedtime  dornase little Solution 2.5 milliGRAM(s) Inhalation daily  heparin   Injectable 5000 Unit(s) SubCutaneous every 12 hours  hydromorphone (10 MICROgram(s)/mL) + bupivacaine 0.0625% in 0.9% Sodium Chloride PCEA 250 milliLiter(s) Epidural PCA Continuous  lactated ringers. 1000 milliLiter(s) (30 mL/Hr) IV Continuous <Continuous>  pantoprazole    Tablet 40 milliGRAM(s) Oral before breakfast  phenylephrine    Infusion 0.4 MICROgram(s)/kG/Min (8.64 mL/Hr) IV Continuous <Continuous>  polyethylene glycol 3350 17 Gram(s) Oral daily    MEDICATIONS  (PRN):  diphenhydrAMINE Injectable 12.5 milliGRAM(s) IV Push every 4 hours PRN Pruritus  HYDROmorphone  Injectable 0.25 milliGRAM(s) IV Push every 3 hours PRN Severe Breakthrough Pain (7 - 10)  hydromorphone (10 MICROgram(s)/mL) + bupivacaine 0.0625% in 0.9% Sodium Chloride PCEA Rescue Clinician  Bolus 5 milliLiter(s) Epidural every 15 minutes PRN for Pain Score greater than 6  naloxone Injectable 0.1 milliGRAM(s) IV Push every 3 minutes PRN For ANY of the following changes in patient status:  A. RR LESS THAN 10 breaths per minute, B. Oxygen saturation LESS THAN 90%, C. Sedation score of 6  ondansetron Injectable 4 milliGRAM(s) IV Push every 6 hours PRN Nausea      OBJECTIVE: Patient sitting up in chair. CTX1 in place.    Assessment of Catheter Site:	[ ] Left	[ ] Right  [x ] Epidural 	[ ] Femoral	      [ ] Saphenous   [ ] Supraclavicular   [ ] Other:    [x ] Dressing intact	[x ] Site non-tender	[ x] Site without erythema, discharge, edema  [x ] Epidural tubing and connection checked	[x] Gross neurological exam within normal limits  [ ] Catheter removed – tip intact		[ ] Afebrile  	[ ] Febrile: ___   [ X] see Temp under VS below)    PT/INR - ( 18 Jun 2022 02:40 )   PT: 12.4 sec;   INR: 1.07 ratio         PTT - ( 18 Jun 2022 02:40 )  PTT:32.0 sec                      12.1   9.98  )-----------( 181      ( 18 Jun 2022 02:40 )             35.1     Vital Signs Last 24 Hrs  T(C): 37.1 (06-18-22 @ 04:30), Max: 37.1 (06-18-22 @ 04:30)  T(F): 98.8 (06-18-22 @ 04:30), Max: 98.8 (06-18-22 @ 04:30)  HR: 69 (06-18-22 @ 07:00) (53 - 78)  BP: 105/46 (06-18-22 @ 05:00) (98/49 - 113/51)  BP(mean): 63 (06-18-22 @ 05:00) (61 - 65)  RR: 17 (06-18-22 @ 07:00) (11 - 29)  SpO2: 98% (06-18-22 @ 07:00) (95% - 100%)      Sedation Score:	[x ] Alert	[ ] Drowsy	[ ] Arousable	[ ] Asleep	[ ] Unresponsive    Side Effects:	[x ] None	[ ] Nausea	[ ] Vomiting	[ ] Pruritus  		[ ] Weakness		[ ] Numbness	[ ] Other:    ASSESSMENT/ PLAN:    Therapy to  be:	[x ] Continue   [ ] Discontinued   [ ] Change to prn Analgesics    Documentation and Verification of current medications:  [ X ] Done	[ ] Not done, not eligible, reason:    Comments: Discussed patient with primary team.  Doing OK with epidural and may continue.    Progress Note written now but Patient was seen earlier.

## 2022-06-19 LAB
ALBUMIN SERPL ELPH-MCNC: 3.1 G/DL — LOW (ref 3.3–5)
ALP SERPL-CCNC: 69 U/L — SIGNIFICANT CHANGE UP (ref 40–120)
ALT FLD-CCNC: 46 U/L — HIGH (ref 4–33)
ANION GAP SERPL CALC-SCNC: 11 MMOL/L — SIGNIFICANT CHANGE UP (ref 7–14)
APPEARANCE UR: CLEAR — SIGNIFICANT CHANGE UP
APTT BLD: 35.8 SEC — SIGNIFICANT CHANGE UP (ref 27–36.3)
AST SERPL-CCNC: 60 U/L — HIGH (ref 4–32)
BACTERIA # UR AUTO: NEGATIVE — SIGNIFICANT CHANGE UP
BILIRUB SERPL-MCNC: 1.2 MG/DL — SIGNIFICANT CHANGE UP (ref 0.2–1.2)
BILIRUB UR-MCNC: NEGATIVE — SIGNIFICANT CHANGE UP
BUN SERPL-MCNC: 20 MG/DL — SIGNIFICANT CHANGE UP (ref 7–23)
CALCIUM SERPL-MCNC: 7.4 MG/DL — LOW (ref 8.4–10.5)
CHLORIDE SERPL-SCNC: 102 MMOL/L — SIGNIFICANT CHANGE UP (ref 98–107)
CO2 SERPL-SCNC: 22 MMOL/L — SIGNIFICANT CHANGE UP (ref 22–31)
COLOR SPEC: YELLOW — SIGNIFICANT CHANGE UP
CREAT SERPL-MCNC: 1.04 MG/DL — SIGNIFICANT CHANGE UP (ref 0.5–1.3)
DIFF PNL FLD: ABNORMAL
EGFR: 56 ML/MIN/1.73M2 — LOW
EPI CELLS # UR: 1 /HPF — SIGNIFICANT CHANGE UP (ref 0–5)
GLUCOSE SERPL-MCNC: 114 MG/DL — HIGH (ref 70–99)
GLUCOSE UR QL: NEGATIVE — SIGNIFICANT CHANGE UP
HCT VFR BLD CALC: 37.1 % — SIGNIFICANT CHANGE UP (ref 34.5–45)
HGB BLD-MCNC: 11.9 G/DL — SIGNIFICANT CHANGE UP (ref 11.5–15.5)
HYALINE CASTS # UR AUTO: 1 /LPF — SIGNIFICANT CHANGE UP (ref 0–7)
INR BLD: 1.01 RATIO — SIGNIFICANT CHANGE UP (ref 0.88–1.16)
KETONES UR-MCNC: NEGATIVE — SIGNIFICANT CHANGE UP
LEUKOCYTE ESTERASE UR-ACNC: NEGATIVE — SIGNIFICANT CHANGE UP
MAGNESIUM SERPL-MCNC: 1.9 MG/DL — SIGNIFICANT CHANGE UP (ref 1.6–2.6)
MCHC RBC-ENTMCNC: 30.1 PG — SIGNIFICANT CHANGE UP (ref 27–34)
MCHC RBC-ENTMCNC: 32.1 GM/DL — SIGNIFICANT CHANGE UP (ref 32–36)
MCV RBC AUTO: 93.7 FL — SIGNIFICANT CHANGE UP (ref 80–100)
NITRITE UR-MCNC: NEGATIVE — SIGNIFICANT CHANGE UP
NRBC # BLD: 0 /100 WBCS — SIGNIFICANT CHANGE UP
NRBC # FLD: 0 K/UL — SIGNIFICANT CHANGE UP
PH UR: 6 — SIGNIFICANT CHANGE UP (ref 5–8)
PHOSPHATE SERPL-MCNC: 1.2 MG/DL — LOW (ref 2.5–4.5)
PLATELET # BLD AUTO: 200 K/UL — SIGNIFICANT CHANGE UP (ref 150–400)
POTASSIUM SERPL-MCNC: 4.1 MMOL/L — SIGNIFICANT CHANGE UP (ref 3.5–5.3)
POTASSIUM SERPL-SCNC: 4.1 MMOL/L — SIGNIFICANT CHANGE UP (ref 3.5–5.3)
PROT SERPL-MCNC: 5.9 G/DL — LOW (ref 6–8.3)
PROT UR-MCNC: ABNORMAL
PROTHROM AB SERPL-ACNC: 11.7 SEC — SIGNIFICANT CHANGE UP (ref 10.5–13.4)
RBC # BLD: 3.96 M/UL — SIGNIFICANT CHANGE UP (ref 3.8–5.2)
RBC # FLD: 14.4 % — SIGNIFICANT CHANGE UP (ref 10.3–14.5)
RBC CASTS # UR COMP ASSIST: 2 /HPF — SIGNIFICANT CHANGE UP (ref 0–4)
SODIUM SERPL-SCNC: 135 MMOL/L — SIGNIFICANT CHANGE UP (ref 135–145)
SP GR SPEC: 1.02 — SIGNIFICANT CHANGE UP (ref 1–1.05)
UROBILINOGEN FLD QL: SIGNIFICANT CHANGE UP
WBC # BLD: 13.15 K/UL — HIGH (ref 3.8–10.5)
WBC # FLD AUTO: 13.15 K/UL — HIGH (ref 3.8–10.5)
WBC UR QL: 5 /HPF — SIGNIFICANT CHANGE UP (ref 0–5)

## 2022-06-19 PROCEDURE — 71045 X-RAY EXAM CHEST 1 VIEW: CPT | Mod: 26

## 2022-06-19 PROCEDURE — 99233 SBSQ HOSP IP/OBS HIGH 50: CPT

## 2022-06-19 RX ORDER — METOCLOPRAMIDE HCL 10 MG
10 TABLET ORAL THREE TIMES A DAY
Refills: 0 | Status: COMPLETED | OUTPATIENT
Start: 2022-06-19 | End: 2022-06-21

## 2022-06-19 RX ORDER — PIPERACILLIN AND TAZOBACTAM 4; .5 G/20ML; G/20ML
3.38 INJECTION, POWDER, LYOPHILIZED, FOR SOLUTION INTRAVENOUS EVERY 8 HOURS
Refills: 0 | Status: DISCONTINUED | OUTPATIENT
Start: 2022-06-19 | End: 2022-06-21

## 2022-06-19 RX ORDER — PIPERACILLIN AND TAZOBACTAM 4; .5 G/20ML; G/20ML
3.38 INJECTION, POWDER, LYOPHILIZED, FOR SOLUTION INTRAVENOUS ONCE
Refills: 0 | Status: COMPLETED | OUTPATIENT
Start: 2022-06-19 | End: 2022-06-19

## 2022-06-19 RX ORDER — ALBUMIN HUMAN 25 %
250 VIAL (ML) INTRAVENOUS ONCE
Refills: 0 | Status: COMPLETED | OUTPATIENT
Start: 2022-06-19 | End: 2022-06-19

## 2022-06-19 RX ORDER — ACETAMINOPHEN 500 MG
1000 TABLET ORAL ONCE
Refills: 0 | Status: COMPLETED | OUTPATIENT
Start: 2022-06-19 | End: 2022-06-20

## 2022-06-19 RX ORDER — FUROSEMIDE 40 MG
10 TABLET ORAL ONCE
Refills: 0 | Status: COMPLETED | OUTPATIENT
Start: 2022-06-19 | End: 2022-06-19

## 2022-06-19 RX ADMIN — PIPERACILLIN AND TAZOBACTAM 25 GRAM(S): 4; .5 INJECTION, POWDER, LYOPHILIZED, FOR SOLUTION INTRAVENOUS at 21:49

## 2022-06-19 RX ADMIN — HYDROMORPHONE HYDROCHLORIDE 250 MILLILITER(S): 2 INJECTION INTRAMUSCULAR; INTRAVENOUS; SUBCUTANEOUS at 19:07

## 2022-06-19 RX ADMIN — Medication 400 MILLIGRAM(S): at 06:03

## 2022-06-19 RX ADMIN — ATORVASTATIN CALCIUM 10 MILLIGRAM(S): 80 TABLET, FILM COATED ORAL at 22:30

## 2022-06-19 RX ADMIN — PIPERACILLIN AND TAZOBACTAM 200 GRAM(S): 4; .5 INJECTION, POWDER, LYOPHILIZED, FOR SOLUTION INTRAVENOUS at 18:38

## 2022-06-19 RX ADMIN — Medication 3 MILLILITER(S): at 09:35

## 2022-06-19 RX ADMIN — HEPARIN SODIUM 5000 UNIT(S): 5000 INJECTION INTRAVENOUS; SUBCUTANEOUS at 17:22

## 2022-06-19 RX ADMIN — Medication 3 MILLILITER(S): at 22:52

## 2022-06-19 RX ADMIN — Medication 85 MILLIMOLE(S): at 06:28

## 2022-06-19 RX ADMIN — Medication 10 MILLIGRAM(S): at 17:21

## 2022-06-19 RX ADMIN — Medication 3 MILLILITER(S): at 15:40

## 2022-06-19 RX ADMIN — Medication 3 MILLILITER(S): at 04:23

## 2022-06-19 RX ADMIN — PANTOPRAZOLE SODIUM 40 MILLIGRAM(S): 20 TABLET, DELAYED RELEASE ORAL at 06:03

## 2022-06-19 RX ADMIN — POLYETHYLENE GLYCOL 3350 17 GRAM(S): 17 POWDER, FOR SOLUTION ORAL at 17:22

## 2022-06-19 RX ADMIN — Medication 500 MILLILITER(S): at 19:05

## 2022-06-19 RX ADMIN — Medication 1000 MILLIGRAM(S): at 06:34

## 2022-06-19 RX ADMIN — HYDROMORPHONE HYDROCHLORIDE 250 MILLILITER(S): 2 INJECTION INTRAMUSCULAR; INTRAVENOUS; SUBCUTANEOUS at 07:00

## 2022-06-19 RX ADMIN — DORNASE ALFA 2.5 MILLIGRAM(S): 1 SOLUTION RESPIRATORY (INHALATION) at 09:35

## 2022-06-19 NOTE — PROGRESS NOTE ADULT - SUBJECTIVE AND OBJECTIVE BOX
Anesthesia Pain Management Service: Day __ of Epidural    SUBJECTIVE: Patient doing well with PCEA and no problems.  Pain Scale Score:   Refer to charted pain scores    THERAPY:  [x ] Epidural Bupivacaine 0.0625% and Hydromorphone  		[ ] 10 micrograms/mL	[ ] 5 micrograms/mL  [ ] Epidural Bupivacaine 0.0625% and Fentanyl - 2 micrograms/mL  [ ] Epidural Ropivacaine 0.1% plain – 1 mg/mL  [ ] Patient Controlled Regional Anesthesia (PCRA) Ropivacaine  		[ ] 0.2%			[ ] 0.1%    Demand dose __3_ lockout __15_ (minutes) Continuous Rate ___ Total: ___ Daily      MEDICATIONS  (STANDING):  albuterol/ipratropium for Nebulization 3 milliLiter(s) Nebulizer every 6 hours  atorvastatin 10 milliGRAM(s) Oral at bedtime  dornase little Solution 2.5 milliGRAM(s) Inhalation daily  furosemide   Injectable 10 milliGRAM(s) IV Push once  heparin   Injectable 5000 Unit(s) SubCutaneous every 12 hours  hydromorphone (10 MICROgram(s)/mL) + bupivacaine 0.0625% in 0.9% Sodium Chloride PCEA 250 milliLiter(s) Epidural PCA Continuous  lactated ringers. 1000 milliLiter(s) (30 mL/Hr) IV Continuous <Continuous>  metoclopramide Injectable 10 milliGRAM(s) IV Push three times a day  pantoprazole    Tablet 40 milliGRAM(s) Oral before breakfast  phenylephrine    Infusion 0.4 MICROgram(s)/kG/Min (8.64 mL/Hr) IV Continuous <Continuous>  polyethylene glycol 3350 17 Gram(s) Oral daily    MEDICATIONS  (PRN):  diphenhydrAMINE Injectable 12.5 milliGRAM(s) IV Push every 4 hours PRN Pruritus  HYDROmorphone  Injectable 0.25 milliGRAM(s) IV Push every 3 hours PRN Severe Breakthrough Pain (7 - 10)  hydromorphone (10 MICROgram(s)/mL) + bupivacaine 0.0625% in 0.9% Sodium Chloride PCEA Rescue Clinician  Bolus 5 milliLiter(s) Epidural every 15 minutes PRN for Pain Score greater than 6  naloxone Injectable 0.1 milliGRAM(s) IV Push every 3 minutes PRN For ANY of the following changes in patient status:  A. RR LESS THAN 10 breaths per minute, B. Oxygen saturation LESS THAN 90%, C. Sedation score of 6  ondansetron Injectable 4 milliGRAM(s) IV Push every 6 hours PRN Nausea      OBJECTIVE:    Assessment of Catheter Site:	[ ] Left	[ ] Right  [x ] Epidural 	[ ] Femoral	      [ ] Saphenous   [ ] Supraclavicular   [ ] Other:    [x ] Dressing intact	[x ] Site non-tender	[ x] Site without erythema, discharge, edema  [x ] Epidural tubing and connection checked	[x] Gross neurological exam within normal limits  [ ] Catheter removed – tip intact		[x ] Afebrile	[ ] Febrile: ___    PT/INR - ( 19 Jun 2022 05:00 )   PT: 11.7 sec;   INR: 1.01 ratio         PTT - ( 19 Jun 2022 05:00 )  PTT:35.8 sec                      11.9   13.15 )-----------( 200      ( 19 Jun 2022 05:00 )             37.1     Vital Signs Last 24 Hrs  T(C): 37.3 (06-19-22 @ 08:00), Max: 38.1 (06-19-22 @ 04:00)  T(F): 99.1 (06-19-22 @ 08:00), Max: 100.5 (06-19-22 @ 04:00)  HR: 84 (06-19-22 @ 08:00) (62 - 103)  BP: 90/56 (06-19-22 @ 08:00) (90/56 - 132/65)  BP(mean): 64 (06-19-22 @ 08:00) (56 - 97)  RR: 19 (06-19-22 @ 08:00) (15 - 32)  SpO2: 100% (06-19-22 @ 08:00) (91% - 100%)      Sedation Score:	[x ] Alert	[ ] Drowsy	[ ] Arousable	[ ] Asleep	[ ] Unresponsive    Side Effects:	[x ] None	[ ] Nausea	[ ] Vomiting	[ ] Pruritus  		[ ] Weakness		[ ] Numbness	[ ] Other:    ASSESSMENT/ PLAN:    Therapy to  be:	[x ] Continue   [ ] Discontinued   [ ] Change to prn Analgesics    Documentation and Verification of current medications:  [ X ] Done	[ ] Not done, not eligible, reason:    Comments: Doing OK with epidural and may continue.

## 2022-06-19 NOTE — PROGRESS NOTE ADULT - SUBJECTIVE AND OBJECTIVE BOX
HOSEA GRAY                     [id number]    HPI:  76 year old former smoker , PMH negro esophagus, HTN, HLD,  lung cancer stage 1 s/p RUL resection in 2014 ([facility]) .  had abnormal routine CT of the chest. now presents to [facility] with pre op dx of malignant neoplasm of lung. Patient is scheduled for flexible bronchoscopy , right video assisted thoracoscopy, right thoracotomy with epidural, right upper possible lower lobe wedge resection with interventional radiology marking on 6/15/22   (31 May 2022 09:56)      Procedure: FB, Redo RVATS, Pneumonolysis, RLL/RUL wedge                       Issues:              Hypotension without shock requiring pressors  Lung nodule              Postop pain              Chest tube in place  Negro esophagus / GERD                           PAST MEDICAL & SURGICAL HISTORY:  HTN (Hypertension)      Dyslipidemia      Lumbar Spinal Stenosis      Acid Reflux      Ovarian Cyst      Obesity      Lung nodules      Negro&#x27;s esophagus      CREST syndrome      S/P Arthroscopy of Right Knee      Benign Cyst of Breast  left      H/O bilateral oophorectomy      History of lung surgery  RUL resection in [facility] 2014                VITAL SIGNS:  Vital Signs Last 24 Hrs  T(C): 38.1 (19 Jun 2022 04:00), Max: 38.1 (19 Jun 2022 04:00)  T(F): 100.5 (19 Jun 2022 04:00), Max: 100.5 (19 Jun 2022 04:00)  HR: 79 (19 Jun 2022 07:00) (62 - 103)  BP: 123/44 (19 Jun 2022 07:00) (93/81 - 132/65)  BP(mean): 66 (19 Jun 2022 07:00) (56 - 97)  RR: 18 (19 Jun 2022 07:00) (15 - 32)  SpO2: 93% (19 Jun 2022 07:00) (91% - 100%)    I/Os:   I&O's Detail    18 Jun 2022 07:01  -  19 Jun 2022 07:00  --------------------------------------------------------  IN:    Lactated Ringers: 720 mL    Phenylephrine: 669.3 mL  Total IN: 1389.3 mL    OUT:    Chest Tube (mL): 250 mL    Indwelling Catheter - Urethral (mL): 925 mL    Voided (mL): 600 mL  Total OUT: 1775 mL    Total NET: -385.7 mL          CAPILLARY BLOOD GLUCOSE          =======================MEDICATIONS===================  MEDICATIONS  (STANDING):  albuterol/ipratropium for Nebulization 3 milliLiter(s) Nebulizer every 6 hours  atorvastatin 10 milliGRAM(s) Oral at bedtime  dornase little Solution 2.5 milliGRAM(s) Inhalation daily  furosemide   Injectable 10 milliGRAM(s) IV Push once  heparin   Injectable 5000 Unit(s) SubCutaneous every 12 hours  hydromorphone (10 MICROgram(s)/mL) + bupivacaine 0.0625% in 0.9% Sodium Chloride PCEA 250 milliLiter(s) Epidural PCA Continuous  lactated ringers. 1000 milliLiter(s) (30 mL/Hr) IV Continuous <Continuous>  metoclopramide Injectable 10 milliGRAM(s) IV Push three times a day  pantoprazole    Tablet 40 milliGRAM(s) Oral before breakfast  phenylephrine    Infusion 0.4 MICROgram(s)/kG/Min (8.64 mL/Hr) IV Continuous <Continuous>  polyethylene glycol 3350 17 Gram(s) Oral daily    MEDICATIONS  (PRN):  diphenhydrAMINE Injectable 12.5 milliGRAM(s) IV Push every 4 hours PRN Pruritus  HYDROmorphone  Injectable 0.25 milliGRAM(s) IV Push every 3 hours PRN Severe Breakthrough Pain (7 - 10)  hydromorphone (10 MICROgram(s)/mL) + bupivacaine 0.0625% in 0.9% Sodium Chloride PCEA Rescue Clinician  Bolus 5 milliLiter(s) Epidural every 15 minutes PRN for Pain Score greater than 6  naloxone Injectable 0.1 milliGRAM(s) IV Push every 3 minutes PRN For ANY of the following changes in patient status:  A. RR LESS THAN 10 breaths per minute, B. Oxygen saturation LESS THAN 90%, C. Sedation score of 6  ondansetron Injectable 4 milliGRAM(s) IV Push every 6 hours PRN Nausea        PHYSICAL EXAM============================  General:                         Awake, alert, not in any distress  Neuro:                            Moving all extremities to commands.   Respiratory:	Air entry fair and  bilateral conducted sounds                                           Effort even and unlabored.  CV:		Regular rate and rhythm. Normal S1/S2                                          Distal pulses present.  Abdomen:	                     Soft, non-distended. Bowel sounds present   Skin:		No rash.  Extremities:	Warm, no cyanosis or edema.  Palpable pulses    ============================LABS=========================                        11.9   13.15 )-----------( 200      ( 19 Jun 2022 05:00 )             37.1     06-19    135  |  102  |  20  ----------------------------<  114<H>  4.1   |  22  |  1.04    Ca    7.4<L>      19 Jun 2022 05:00  Phos  1.2     06-19  Mg     1.90     06-19    TPro  5.9<L>  /  Alb  3.1<L>  /  TBili  1.2  /  DBili  x   /  AST  60<H>  /  ALT  46<H>  /  AlkPhos  69  06-19    LIVER FUNCTIONS - ( 19 Jun 2022 05:00 )  Alb: 3.1 g/dL / Pro: 5.9 g/dL / ALK PHOS: 69 U/L / ALT: 46 U/L / AST: 60 U/L / GGT: x           PT/INR - ( 19 Jun 2022 05:00 )   PT: 11.7 sec;   INR: 1.01 ratio         PTT - ( 19 Jun 2022 05:00 )  PTT:35.8 sec    A/P:  76yFemale s/p FB, Redo RVATS, Pneumonolysis, RLL/RUL wedge , experiencing  pain with deep breathing.                             Neuro:                                         Pain control with  PCEA /  Tylenol PRN                            Cardiovascular:                                          Telemetry (medical test) - Reviewed by me today independently. Normal sinus rhythm.                                          Continue hemodynamic monitoring to prevent decompensation.    Hypotension without shock requiring pressors: Hypotension probably due to vasodilation from PCEA  Titrate Oneil to MAP>65                                Respiratory:                                         Postop hypoxemia requiring O2 via nasal cannula probably due to postop pain - Wean nasal cannula for goal O2sat above 92%.                                              Obtain CXR . Encourage incentive spirometry.                                                   Chest PT and frequent suctioning. Continue bronchodilators, Pulmozyme and inhaled 3% saline inhalations.                                                      OOB to chair & ambulate w/ assistance.                                                           Continuous pulse oximetry for support & to prevent decompensation.                                         Monitor chest tube output                                         Chest tube to water seal                                                                                            GI                                         On  DASH  diet as tolerated                                         Continue Zofran / Reglan for nausea - PRN                                         Continue bowel regimen	                                                                 Renal:                                         Continue LR  30cc/hr                                         Monitor I/Os and electrolytes                                                                                        Hem/ Onc:                                         DVT prophylaxis with SQ Heparin and SCDs.                                         Monitor chest tube output &  signs of bleeding.                                          Follow CBC in AM                           Infectious disease:                                            Monitor for fever / leukocytosis.                                          All surgical incision / chest tube  sites look clean                            Endocrine                                            Continue Accu-Checks with coverage                                               Pertinent clinical, laboratory, radiographic, hemodynamic, echocardiographic, respiratory data, microbiologic data and chart were reviewed and analyzed frequently throughout the course of the day and night.     Patient seen, examined and plan discussed with CT Surgeon Dr. Underwood / CTICU team during rounds.    OOB to chair and ambulate as tolerated.     Status discussed with patient and pt's daughter at bedside updated plan of care.     I have spent  35 minutes of critical care time with this pt between  7am and 11.59pm monitoring hemodynamic status, managing fluid resuscitation /  pressors to prevent shock.       Ha ZHOUP

## 2022-06-20 LAB
ANION GAP SERPL CALC-SCNC: 12 MMOL/L — SIGNIFICANT CHANGE UP (ref 7–14)
ANION GAP SERPL CALC-SCNC: 9 MMOL/L — SIGNIFICANT CHANGE UP (ref 7–14)
APTT BLD: 30.6 SEC — SIGNIFICANT CHANGE UP (ref 27–36.3)
BUN SERPL-MCNC: 13 MG/DL — SIGNIFICANT CHANGE UP (ref 7–23)
BUN SERPL-MCNC: 9 MG/DL — SIGNIFICANT CHANGE UP (ref 7–23)
CA-I BLD-SCNC: 1.01 MMOL/L — LOW (ref 1.15–1.29)
CALCIUM SERPL-MCNC: 7.4 MG/DL — LOW (ref 8.4–10.5)
CALCIUM SERPL-MCNC: 7.8 MG/DL — LOW (ref 8.4–10.5)
CHLORIDE SERPL-SCNC: 103 MMOL/L — SIGNIFICANT CHANGE UP (ref 98–107)
CHLORIDE SERPL-SCNC: 106 MMOL/L — SIGNIFICANT CHANGE UP (ref 98–107)
CO2 SERPL-SCNC: 21 MMOL/L — LOW (ref 22–31)
CO2 SERPL-SCNC: 22 MMOL/L — SIGNIFICANT CHANGE UP (ref 22–31)
CORTIS F PM SERPL-MCNC: 9 UG/DL — SIGNIFICANT CHANGE UP (ref 2.7–10.5)
CREAT SERPL-MCNC: 0.67 MG/DL — SIGNIFICANT CHANGE UP (ref 0.5–1.3)
CREAT SERPL-MCNC: 0.71 MG/DL — SIGNIFICANT CHANGE UP (ref 0.5–1.3)
EGFR: 88 ML/MIN/1.73M2 — SIGNIFICANT CHANGE UP
EGFR: 91 ML/MIN/1.73M2 — SIGNIFICANT CHANGE UP
GLUCOSE SERPL-MCNC: 122 MG/DL — HIGH (ref 70–99)
GLUCOSE SERPL-MCNC: 148 MG/DL — HIGH (ref 70–99)
HCT VFR BLD CALC: 26.2 % — LOW (ref 34.5–45)
HCT VFR BLD CALC: 28.3 % — LOW (ref 34.5–45)
HCT VFR BLD CALC: 32.9 % — LOW (ref 34.5–45)
HGB BLD-MCNC: 10.4 G/DL — LOW (ref 11.5–15.5)
HGB BLD-MCNC: 9 G/DL — LOW (ref 11.5–15.5)
HGB BLD-MCNC: 9.5 G/DL — LOW (ref 11.5–15.5)
INR BLD: 1.09 RATIO — SIGNIFICANT CHANGE UP (ref 0.88–1.16)
LACTATE SERPL-SCNC: 2.1 MMOL/L — HIGH (ref 0.5–2)
MAGNESIUM SERPL-MCNC: 1.7 MG/DL — SIGNIFICANT CHANGE UP (ref 1.6–2.6)
MAGNESIUM SERPL-MCNC: 1.9 MG/DL — SIGNIFICANT CHANGE UP (ref 1.6–2.6)
MCHC RBC-ENTMCNC: 29.8 PG — SIGNIFICANT CHANGE UP (ref 27–34)
MCHC RBC-ENTMCNC: 30.3 PG — SIGNIFICANT CHANGE UP (ref 27–34)
MCHC RBC-ENTMCNC: 31.1 PG — SIGNIFICANT CHANGE UP (ref 27–34)
MCHC RBC-ENTMCNC: 31.6 GM/DL — LOW (ref 32–36)
MCHC RBC-ENTMCNC: 33.6 GM/DL — SIGNIFICANT CHANGE UP (ref 32–36)
MCHC RBC-ENTMCNC: 34.4 GM/DL — SIGNIFICANT CHANGE UP (ref 32–36)
MCV RBC AUTO: 90.1 FL — SIGNIFICANT CHANGE UP (ref 80–100)
MCV RBC AUTO: 90.7 FL — SIGNIFICANT CHANGE UP (ref 80–100)
MCV RBC AUTO: 94.3 FL — SIGNIFICANT CHANGE UP (ref 80–100)
NRBC # BLD: 0 /100 WBCS — SIGNIFICANT CHANGE UP
NRBC # FLD: 0 K/UL — SIGNIFICANT CHANGE UP
PHOSPHATE SERPL-MCNC: 1.6 MG/DL — LOW (ref 2.5–4.5)
PHOSPHATE SERPL-MCNC: 3.3 MG/DL — SIGNIFICANT CHANGE UP (ref 2.5–4.5)
PLATELET # BLD AUTO: 115 K/UL — LOW (ref 150–400)
PLATELET # BLD AUTO: 141 K/UL — LOW (ref 150–400)
PLATELET # BLD AUTO: 158 K/UL — SIGNIFICANT CHANGE UP (ref 150–400)
POTASSIUM SERPL-MCNC: 3.9 MMOL/L — SIGNIFICANT CHANGE UP (ref 3.5–5.3)
POTASSIUM SERPL-MCNC: 4.6 MMOL/L — SIGNIFICANT CHANGE UP (ref 3.5–5.3)
POTASSIUM SERPL-SCNC: 3.9 MMOL/L — SIGNIFICANT CHANGE UP (ref 3.5–5.3)
POTASSIUM SERPL-SCNC: 4.6 MMOL/L — SIGNIFICANT CHANGE UP (ref 3.5–5.3)
PROTHROM AB SERPL-ACNC: 12.6 SEC — SIGNIFICANT CHANGE UP (ref 10.5–13.4)
RBC # BLD: 2.89 M/UL — LOW (ref 3.8–5.2)
RBC # BLD: 3.14 M/UL — LOW (ref 3.8–5.2)
RBC # BLD: 3.49 M/UL — LOW (ref 3.8–5.2)
RBC # FLD: 14.4 % — SIGNIFICANT CHANGE UP (ref 10.3–14.5)
RBC # FLD: 14.6 % — HIGH (ref 10.3–14.5)
RBC # FLD: 14.7 % — HIGH (ref 10.3–14.5)
SODIUM SERPL-SCNC: 136 MMOL/L — SIGNIFICANT CHANGE UP (ref 135–145)
SODIUM SERPL-SCNC: 137 MMOL/L — SIGNIFICANT CHANGE UP (ref 135–145)
WBC # BLD: 7.07 K/UL — SIGNIFICANT CHANGE UP (ref 3.8–10.5)
WBC # BLD: 8.01 K/UL — SIGNIFICANT CHANGE UP (ref 3.8–10.5)
WBC # BLD: 9.73 K/UL — SIGNIFICANT CHANGE UP (ref 3.8–10.5)
WBC # FLD AUTO: 7.07 K/UL — SIGNIFICANT CHANGE UP (ref 3.8–10.5)
WBC # FLD AUTO: 8.01 K/UL — SIGNIFICANT CHANGE UP (ref 3.8–10.5)
WBC # FLD AUTO: 9.73 K/UL — SIGNIFICANT CHANGE UP (ref 3.8–10.5)

## 2022-06-20 PROCEDURE — 36569 INSJ PICC 5 YR+ W/O IMAGING: CPT

## 2022-06-20 PROCEDURE — 71045 X-RAY EXAM CHEST 1 VIEW: CPT | Mod: 26

## 2022-06-20 PROCEDURE — 99291 CRITICAL CARE FIRST HOUR: CPT

## 2022-06-20 RX ORDER — SODIUM,POTASSIUM PHOSPHATES 278-250MG
1 POWDER IN PACKET (EA) ORAL THREE TIMES A DAY
Refills: 0 | Status: DISCONTINUED | OUTPATIENT
Start: 2022-06-20 | End: 2022-06-21

## 2022-06-20 RX ORDER — POTASSIUM PHOSPHATE, MONOBASIC POTASSIUM PHOSPHATE, DIBASIC 236; 224 MG/ML; MG/ML
15 INJECTION, SOLUTION INTRAVENOUS ONCE
Refills: 0 | Status: DISCONTINUED | OUTPATIENT
Start: 2022-06-20 | End: 2022-06-20

## 2022-06-20 RX ORDER — MAGNESIUM SULFATE 500 MG/ML
2 VIAL (ML) INJECTION ONCE
Refills: 0 | Status: COMPLETED | OUTPATIENT
Start: 2022-06-20 | End: 2022-06-20

## 2022-06-20 RX ORDER — HYDROMORPHONE HYDROCHLORIDE 2 MG/ML
0.3 INJECTION INTRAMUSCULAR; INTRAVENOUS; SUBCUTANEOUS
Refills: 0 | Status: DISCONTINUED | OUTPATIENT
Start: 2022-06-20 | End: 2022-06-21

## 2022-06-20 RX ORDER — LIDOCAINE 4 G/100G
1 CREAM TOPICAL DAILY
Refills: 0 | Status: DISCONTINUED | OUTPATIENT
Start: 2022-06-20 | End: 2022-06-21

## 2022-06-20 RX ORDER — SODIUM CHLORIDE 9 MG/ML
250 INJECTION INTRAMUSCULAR; INTRAVENOUS; SUBCUTANEOUS ONCE
Refills: 0 | Status: COMPLETED | OUTPATIENT
Start: 2022-06-20 | End: 2022-06-20

## 2022-06-20 RX ORDER — ACETAMINOPHEN 500 MG
1000 TABLET ORAL ONCE
Refills: 0 | Status: DISCONTINUED | OUTPATIENT
Start: 2022-06-20 | End: 2022-06-21

## 2022-06-20 RX ORDER — MAGNESIUM SULFATE 500 MG/ML
1 VIAL (ML) INJECTION ONCE
Refills: 0 | Status: COMPLETED | OUTPATIENT
Start: 2022-06-20 | End: 2022-06-20

## 2022-06-20 RX ORDER — VANCOMYCIN HCL 1 G
1000 VIAL (EA) INTRAVENOUS ONCE
Refills: 0 | Status: COMPLETED | OUTPATIENT
Start: 2022-06-20 | End: 2022-06-20

## 2022-06-20 RX ORDER — SENNA PLUS 8.6 MG/1
2 TABLET ORAL AT BEDTIME
Refills: 0 | Status: DISCONTINUED | OUTPATIENT
Start: 2022-06-20 | End: 2022-06-21

## 2022-06-20 RX ORDER — ALBUMIN HUMAN 25 %
250 VIAL (ML) INTRAVENOUS ONCE
Refills: 0 | Status: COMPLETED | OUTPATIENT
Start: 2022-06-20 | End: 2022-06-20

## 2022-06-20 RX ORDER — VANCOMYCIN HCL 1 G
1 VIAL (EA) INTRAVENOUS ONCE
Refills: 0 | Status: DISCONTINUED | OUTPATIENT
Start: 2022-06-20 | End: 2022-06-20

## 2022-06-20 RX ORDER — ACETAMINOPHEN 500 MG
1000 TABLET ORAL ONCE
Refills: 0 | Status: COMPLETED | OUTPATIENT
Start: 2022-06-21 | End: 2022-06-21

## 2022-06-20 RX ORDER — POTASSIUM PHOSPHATE, MONOBASIC POTASSIUM PHOSPHATE, DIBASIC 236; 224 MG/ML; MG/ML
30 INJECTION, SOLUTION INTRAVENOUS ONCE
Refills: 0 | Status: COMPLETED | OUTPATIENT
Start: 2022-06-20 | End: 2022-06-20

## 2022-06-20 RX ORDER — OXYCODONE HYDROCHLORIDE 5 MG/1
5 TABLET ORAL
Refills: 0 | Status: DISCONTINUED | OUTPATIENT
Start: 2022-06-20 | End: 2022-06-21

## 2022-06-20 RX ORDER — OXYCODONE HYDROCHLORIDE 5 MG/1
7.5 TABLET ORAL
Refills: 0 | Status: DISCONTINUED | OUTPATIENT
Start: 2022-06-20 | End: 2022-06-21

## 2022-06-20 RX ADMIN — SODIUM CHLORIDE 30 MILLILITER(S): 9 INJECTION, SOLUTION INTRAVENOUS at 18:10

## 2022-06-20 RX ADMIN — Medication 10 MILLIGRAM(S): at 15:23

## 2022-06-20 RX ADMIN — HYDROMORPHONE HYDROCHLORIDE 250 MILLILITER(S): 2 INJECTION INTRAMUSCULAR; INTRAVENOUS; SUBCUTANEOUS at 07:31

## 2022-06-20 RX ADMIN — Medication 1 PACKET(S): at 13:29

## 2022-06-20 RX ADMIN — Medication 25 GRAM(S): at 06:14

## 2022-06-20 RX ADMIN — Medication 500 MILLILITER(S): at 00:00

## 2022-06-20 RX ADMIN — HEPARIN SODIUM 5000 UNIT(S): 5000 INJECTION INTRAVENOUS; SUBCUTANEOUS at 05:59

## 2022-06-20 RX ADMIN — Medication 1000 MILLIGRAM(S): at 17:20

## 2022-06-20 RX ADMIN — SENNA PLUS 2 TABLET(S): 8.6 TABLET ORAL at 22:03

## 2022-06-20 RX ADMIN — SODIUM CHLORIDE 30 MILLILITER(S): 9 INJECTION, SOLUTION INTRAVENOUS at 20:27

## 2022-06-20 RX ADMIN — PIPERACILLIN AND TAZOBACTAM 25 GRAM(S): 4; .5 INJECTION, POWDER, LYOPHILIZED, FOR SOLUTION INTRAVENOUS at 06:13

## 2022-06-20 RX ADMIN — Medication 10 MILLIGRAM(S): at 01:00

## 2022-06-20 RX ADMIN — HEPARIN SODIUM 5000 UNIT(S): 5000 INJECTION INTRAVENOUS; SUBCUTANEOUS at 17:09

## 2022-06-20 RX ADMIN — Medication 3 MILLILITER(S): at 10:41

## 2022-06-20 RX ADMIN — PIPERACILLIN AND TAZOBACTAM 25 GRAM(S): 4; .5 INJECTION, POWDER, LYOPHILIZED, FOR SOLUTION INTRAVENOUS at 15:25

## 2022-06-20 RX ADMIN — Medication 3 MILLILITER(S): at 03:54

## 2022-06-20 RX ADMIN — DORNASE ALFA 2.5 MILLIGRAM(S): 1 SOLUTION RESPIRATORY (INHALATION) at 10:41

## 2022-06-20 RX ADMIN — Medication 400 MILLIGRAM(S): at 17:04

## 2022-06-20 RX ADMIN — SODIUM CHLORIDE 250 MILLILITER(S): 9 INJECTION INTRAMUSCULAR; INTRAVENOUS; SUBCUTANEOUS at 17:55

## 2022-06-20 RX ADMIN — ATORVASTATIN CALCIUM 10 MILLIGRAM(S): 80 TABLET, FILM COATED ORAL at 22:03

## 2022-06-20 RX ADMIN — Medication 10 MILLIGRAM(S): at 09:57

## 2022-06-20 RX ADMIN — Medication 1 PACKET(S): at 22:00

## 2022-06-20 RX ADMIN — POLYETHYLENE GLYCOL 3350 17 GRAM(S): 17 POWDER, FOR SOLUTION ORAL at 13:29

## 2022-06-20 RX ADMIN — Medication 100 GRAM(S): at 20:27

## 2022-06-20 RX ADMIN — POTASSIUM PHOSPHATE, MONOBASIC POTASSIUM PHOSPHATE, DIBASIC 83.33 MILLIMOLE(S): 236; 224 INJECTION, SOLUTION INTRAVENOUS at 11:21

## 2022-06-20 RX ADMIN — Medication 10 MILLIGRAM(S): at 22:03

## 2022-06-20 RX ADMIN — PHENYLEPHRINE HYDROCHLORIDE 8.64 MICROGRAM(S)/KG/MIN: 10 INJECTION INTRAVENOUS at 08:00

## 2022-06-20 RX ADMIN — Medication 3 MILLILITER(S): at 15:17

## 2022-06-20 RX ADMIN — PIPERACILLIN AND TAZOBACTAM 25 GRAM(S): 4; .5 INJECTION, POWDER, LYOPHILIZED, FOR SOLUTION INTRAVENOUS at 22:01

## 2022-06-20 RX ADMIN — Medication 250 MILLIGRAM(S): at 13:28

## 2022-06-20 RX ADMIN — PANTOPRAZOLE SODIUM 40 MILLIGRAM(S): 20 TABLET, DELAYED RELEASE ORAL at 06:34

## 2022-06-20 NOTE — PROGRESS NOTE ADULT - SUBJECTIVE AND OBJECTIVE BOX
HOSEA GRAY      76y   Female   [id number]         No Known Allergies             Daily     Daily Drug Dosing Weight  Height (cm): 147.3 (2022 07:49)  Weight (kg): 57.6 (2022 07:49)  BMI (kg/m2): 26.5 (2022 07:49)  BSA (m2): 1.5 (2022 07:49)    76 year old former smoker , PMH negro esophagus, HTN, HLD,  lung cancer stage 1 s/p RUL resection in  ([facility]) .  had abnormal routine CT of the chest. now presents to [facility] with pre op dx of malignant neoplasm of lung. Patient is scheduled for flexible bronchoscopy , right video assisted thoracoscopy, right thoracotomy with epidural, right upper possible lower lobe wedge resection with interventional radiology marking on 6/15/22   (31 May 2022 09:56)    Procedure: FB, Redo RVATS, Pneumonolysis, RLL/RUL wedge                       Issues:              Hypotension without shock requiring pressors  Right arm Phlebitis  Lung nodule              Postop pain              Chest tube in place  Negro esophagus / GERD  HLD      Postop course:     Patient reports moderate pain at chest wall incision sites which is worse with coughing and deep breathing without associated fever or dyspnea. Pain is improved with use of PCA and  oral pain meds.         Home Medications:  amlodipine-benazepril 5 mg-10 mg oral capsule: 1 cap(s) orally once a day (2022 08:13)  Calcium 600+D oral tablet: 1 tab(s) orally 1 times a day (2022 08:13)  famotidine 40 mg oral tablet: 1 tab(s) orally once a day (at bedtime) (2022 08:13)  Lipitor 10 mg oral tablet: 1 tab(s) orally once a day (2022 08:13)  omeprazole 40 mg oral delayed release capsule: 1 cap(s) orally once a day (2022 08:13)  Vitamin B-12 1000 mcg oral tablet: 1 tab(s) orally once a day (2022 08:13)  Vitamin D3 1000 intl units (25 mcg) oral capsule:  (2022 08:13)    PAST MEDICAL & SURGICAL HISTORY:  HTN (Hypertension)    Dyslipidemia    Lumbar Spinal Stenosis    Acid Reflux    Ovarian Cyst    Obesity    Lung nodules    Negro&#x27;s esophagus    CREST syndrome    S/P Arthroscopy of Right Knee    Benign Cyst of Breast  left    H/O bilateral oophorectomy    History of lung surgery  RUL resection in [facility] 2014      Vital Signs Last 24 Hrs  T(C): 37.4 (2022 04:00), Max: 38.6 (2022 15:00)  T(F): 99.4 (2022 04:00), Max: 101.5 (2022 15:00)  HR: 88 (2022 07:00) (65 - 100)  BP: 120/48 (2022 07:00) (79/61 - 147/67)  BP(mean): 67 (2022 07:00) (57 - 101)  RR: 23 (2022 07:00) (13 - 26)  SpO2: 94% (2022 05:00) (94% - 100%)  I&O's Detail    2022 07:01  -  2022 07:00  --------------------------------------------------------  IN:    Albumin 5%  - 250 mL: 250 mL    IV PiggyBack: 450 mL    Lactated Ringers: 540 mL    Phenylephrine: 521.4 mL  Total IN: 1761.4 mL    OUT:    Chest Tube (mL): 70 mL    Voided (mL): 3500 mL  Total OUT: 3570 mL    Total NET: -1808.6 mL      CAPILLARY BLOOD GLUCOSE      Home Medications:  amlodipine-benazepril 5 mg-10 mg oral capsule: 1 cap(s) orally once a day (2022 08:13)  Calcium 600+D oral tablet: 1 tab(s) orally 1 times a day (2022 08:13)  famotidine 40 mg oral tablet: 1 tab(s) orally once a day (at bedtime) (2022 08:13)  Lipitor 10 mg oral tablet: 1 tab(s) orally once a day (2022 08:13)  omeprazole 40 mg oral delayed release capsule: 1 cap(s) orally once a day (2022 08:13)  Vitamin B-12 1000 mcg oral tablet: 1 tab(s) orally once a day (2022 08:13)  Vitamin D3 1000 intl units (25 mcg) oral capsule:  (2022 08:13)    MEDICATIONS  (STANDING):  acetaminophen   IVPB .. 1000 milliGRAM(s) IV Intermittent once  albuterol/ipratropium for Nebulization 3 milliLiter(s) Nebulizer every 6 hours  atorvastatin 10 milliGRAM(s) Oral at bedtime  bisacodyl Suppository 10 milliGRAM(s) Rectal once  dornase little Solution 2.5 milliGRAM(s) Inhalation daily  heparin   Injectable 5000 Unit(s) SubCutaneous every 12 hours  hydromorphone (10 MICROgram(s)/mL) + bupivacaine 0.0625% in 0.9% Sodium Chloride PCEA 250 milliLiter(s) Epidural PCA Continuous  lactated ringers. 1000 milliLiter(s) (30 mL/Hr) IV Continuous <Continuous>  metoclopramide Injectable 10 milliGRAM(s) IV Push three times a day  pantoprazole    Tablet 40 milliGRAM(s) Oral before breakfast  phenylephrine    Infusion 0.4 MICROgram(s)/kG/Min (8.64 mL/Hr) IV Continuous <Continuous>  piperacillin/tazobactam IVPB.. 3.375 Gram(s) IV Intermittent every 8 hours  polyethylene glycol 3350 17 Gram(s) Oral daily  potassium phosphate IVPB 15 milliMole(s) IV Intermittent once    MEDICATIONS  (PRN):  diphenhydrAMINE Injectable 12.5 milliGRAM(s) IV Push every 4 hours PRN Pruritus  HYDROmorphone  Injectable 0.25 milliGRAM(s) IV Push every 3 hours PRN Severe Breakthrough Pain (7 - 10)  hydromorphone (10 MICROgram(s)/mL) + bupivacaine 0.0625% in 0.9% Sodium Chloride PCEA Rescue Clinician  Bolus 5 milliLiter(s) Epidural every 15 minutes PRN for Pain Score greater than 6  naloxone Injectable 0.1 milliGRAM(s) IV Push every 3 minutes PRN For ANY of the following changes in patient status:  A. RR LESS THAN 10 breaths per minute, B. Oxygen saturation LESS THAN 90%, C. Sedation score of 6  ondansetron Injectable 4 milliGRAM(s) IV Push every 6 hours PRN Nausea        Physical exam:                           General:               Pt is awake, alert,  appears to be in pain but not in distress                              Eyes:                     Sclera white, Conjunctiva normal, Eyelids normal, Pupils symmetrical and reactive                                               Neuro:                  Nonfocal                             Psych:                   A&Ox3                          Cardiovascular:   S1 & S2, regular                           Respiratory:         Air entry is fair and equal on both sides, has  conducted sounds mostly on the right side.                           GI:                          Soft, nondistended and nontender, Bowel sounds active                            Ext:                        Right arm is red and swollen around IV. No cyanosis or edema or LE                            Labs:                                                                           10.4   9.73  )-----------( 158      ( 2022 07:20 )             32.9             06-20    137  |  103  |  13  ----------------------------<  122<H>  3.9   |  22  |  0.71    Ca    7.8<L>      2022 04:36  Phos  1.6     06-20  Mg     1.70     06-20    TPro  5.9<L>  /  Alb  3.1<L>  /  TBili  1.2  /  DBili  x   /  AST  60<H>  /  ALT  46<H>  /  AlkPhos  69  06-19                  PT/INR - ( 2022 04:36 )   PT: 12.6 sec;   INR: 1.09 ratio         PTT - ( 2022 04:36 )  PTT:30.6 sec  LIVER FUNCTIONS - ( 2022 05:00 )  Alb: 3.1 g/dL / Pro: 5.9 g/dL / ALK PHOS: 69 U/L / ALT: 46 U/L / AST: 60 U/L / GGT: x           Urinalysis Basic - ( 2022 18:00 )    Color: Yellow / Appearance: Clear / S.025 / pH: x  Gluc: x / Ketone: Negative  / Bili: Negative / Urobili: <2 mg/dL   Blood: x / Protein: Trace / Nitrite: Negative   Leuk Esterase: Negative / RBC: 2 /HPF / WBC 5 /HPF   Sq Epi: x / Non Sq Epi: 1 /HPF / Bacteria: Negative          CXR:  < from: Xray Chest 1 View- PORTABLE-Routine (Xray Chest 1 View- PORTABLE-Routine in AM.) (22 @ 04:53) >  The right chest tube, patchy opacities throughout the right lung, right   pneumothorax and right subcutaneous emphysema are unchanged.    The left lung is clear.        Plan:  General: 76yFemale s/p FB, Redo RVATS, Pneumonolysis, RLL/RUL wedge , experiencing  pain with deep breathing.                             Neuro:                                         Pain control with  PCEA /  Tylenol PRN  Epidural may be d/cd today                             Cardiovascular:                                          Telemetry (medical test) - Reviewed by me today independently. Normal sinus rhythm.                                          Continue hemodynamic monitoring to prevent decompensation.    Hypotension without shock requiring pressors: Hypotension probably due to vasodilation from PCEA and fever due to phlebitis  Titrate Oneil to MAP>65                                Respiratory:                                         Postop hypoxemia requiring O2 via nasal cannula probably due to postop pain - Wean nasal cannula for goal O2sat above 92%.                                              Obtain CXR . Encourage incentive spirometry.                                                   Chest PT and frequent suctioning. Continue bronchodilators, Pulmozyme and inhaled 3% saline inhalations.                                                      OOB to chair & ambulate w/ assistance.                                                           Continuous pulse oximetry for support & to prevent decompensation.                                         Monitor chest tube output                                         Chest tube to water seal                                                                                            GI                                         On  DASH  diet as tolerated                                         Continue Zofran / Reglan for nausea - PRN                                         Continue bowel regimen	                                                                 Renal:                                         Continue LR  30cc/hr                                         Monitor I/Os and electrolytes                                                                                        Hem/ Onc:                                         DVT prophylaxis with SQ Heparin and SCDs.                                         Monitor chest tube output &  signs of bleeding.                                          Follow CBC in AM                           Infectious disease:      Febrile without leukocytosis - Probably due to right arm phlebitis.   Right arm IV d/cd                                          Monitor for fever / leukocytosis and continue antibiotics                                          All surgical incision / chest tube  sites look clean.                             Endocrine                                            Continue Accu-Checks with coverage                                               Pertinent clinical, laboratory, radiographic, hemodynamic, echocardiographic, respiratory data, microbiologic data and chart were reviewed and analyzed frequently throughout the course of the day and night.     Patient seen, examined and plan discussed with CT Surgeon Dr. Bravo / CTICU team during rounds.    OOB to chair and ambulate as tolerated.     Status discussed with patient and pt's daughter at bedside updated plan of care.     I have spent  40  minutes of critical care time with this pt between  7am and 11.59pm monitoring hemodynamic status, managing fluid resuscitation /  pressors to prevent shock.           Antoine Abarca MD                                                                     HOSEA GRAY      76y   Female   MRN-[id number]         No Known Allergies             Daily     Daily Drug Dosing Weight  Height (cm): 147.3 (2022 07:49)  Weight (kg): 57.6 (2022 07:49)  BMI (kg/m2): 26.5 (2022 07:49)  BSA (m2): 1.5 (2022 07:49)    76 year old former smoker , PMH negro esophagus, HTN, HLD,  lung cancer stage 1 s/p RUL resection in  ([facility]) .  had abnormal routine CT of the chest. now presents to [facility] with pre op dx of malignant neoplasm of lung. Patient is scheduled for flexible bronchoscopy , right video assisted thoracoscopy, right thoracotomy with epidural, right upper possible lower lobe wedge resection with interventional radiology marking on 6/15/22   (31 May 2022 09:56)    Procedure: FB, Redo RVATS, Pneumonolysis, RLL/RUL wedge                       Issues:              Hypotension without shock requiring pressors  Right arm Phlebitis  Lung nodule              Postop pain              Chest tube in place  Negro esophagus / GERD  HLD  Hypophosphatemia      Postop course:     Patient reports moderate pain at chest wall incision sites which is worse with coughing and deep breathing without associated fever or dyspnea. Pain is improved with use of PCA and  oral pain meds.         Home Medications:  amlodipine-benazepril 5 mg-10 mg oral capsule: 1 cap(s) orally once a day (2022 08:13)  Calcium 600+D oral tablet: 1 tab(s) orally 1 times a day (2022 08:13)  famotidine 40 mg oral tablet: 1 tab(s) orally once a day (at bedtime) (2022 08:13)  Lipitor 10 mg oral tablet: 1 tab(s) orally once a day (2022 08:13)  omeprazole 40 mg oral delayed release capsule: 1 cap(s) orally once a day (2022 08:13)  Vitamin B-12 1000 mcg oral tablet: 1 tab(s) orally once a day (2022 08:13)  Vitamin D3 1000 intl units (25 mcg) oral capsule:  (2022 08:13)    PAST MEDICAL & SURGICAL HISTORY:  HTN (Hypertension)    Dyslipidemia    Lumbar Spinal Stenosis    Acid Reflux    Ovarian Cyst    Obesity    Lung nodules    Negro&#x27;s esophagus    CREST syndrome    S/P Arthroscopy of Right Knee    Benign Cyst of Breast  left    H/O bilateral oophorectomy    History of lung surgery  RUL resection in [facility] 2014      Vital Signs Last 24 Hrs  T(C): 37.4 (2022 04:00), Max: 38.6 (2022 15:00)  T(F): 99.4 (2022 04:00), Max: 101.5 (2022 15:00)  HR: 88 (2022 07:00) (65 - 100)  BP: 120/48 (2022 07:00) (79/61 - 147/67)  BP(mean): 67 (2022 07:00) (57 - 101)  RR: 23 (2022 07:00) (13 - 26)  SpO2: 94% (2022 05:00) (94% - 100%)  I&O's Detail    2022 07:01  -  2022 07:00  --------------------------------------------------------  IN:    Albumin 5%  - 250 mL: 250 mL    IV PiggyBack: 450 mL    Lactated Ringers: 540 mL    Phenylephrine: 521.4 mL  Total IN: 1761.4 mL    OUT:    Chest Tube (mL): 70 mL    Voided (mL): 3500 mL  Total OUT: 3570 mL    Total NET: -1808.6 mL      CAPILLARY BLOOD GLUCOSE      Home Medications:  amlodipine-benazepril 5 mg-10 mg oral capsule: 1 cap(s) orally once a day (2022 08:13)  Calcium 600+D oral tablet: 1 tab(s) orally 1 times a day (2022 08:13)  famotidine 40 mg oral tablet: 1 tab(s) orally once a day (at bedtime) (2022 08:13)  Lipitor 10 mg oral tablet: 1 tab(s) orally once a day (2022 08:13)  omeprazole 40 mg oral delayed release capsule: 1 cap(s) orally once a day (2022 08:13)  Vitamin B-12 1000 mcg oral tablet: 1 tab(s) orally once a day (2022 08:13)  Vitamin D3 1000 intl units (25 mcg) oral capsule:  (2022 08:13)    MEDICATIONS  (STANDING):  acetaminophen   IVPB .. 1000 milliGRAM(s) IV Intermittent once  albuterol/ipratropium for Nebulization 3 milliLiter(s) Nebulizer every 6 hours  atorvastatin 10 milliGRAM(s) Oral at bedtime  bisacodyl Suppository 10 milliGRAM(s) Rectal once  dornase little Solution 2.5 milliGRAM(s) Inhalation daily  heparin   Injectable 5000 Unit(s) SubCutaneous every 12 hours  hydromorphone (10 MICROgram(s)/mL) + bupivacaine 0.0625% in 0.9% Sodium Chloride PCEA 250 milliLiter(s) Epidural PCA Continuous  lactated ringers. 1000 milliLiter(s) (30 mL/Hr) IV Continuous <Continuous>  metoclopramide Injectable 10 milliGRAM(s) IV Push three times a day  pantoprazole    Tablet 40 milliGRAM(s) Oral before breakfast  phenylephrine    Infusion 0.4 MICROgram(s)/kG/Min (8.64 mL/Hr) IV Continuous <Continuous>  piperacillin/tazobactam IVPB.. 3.375 Gram(s) IV Intermittent every 8 hours  polyethylene glycol 3350 17 Gram(s) Oral daily  potassium phosphate IVPB 15 milliMole(s) IV Intermittent once    MEDICATIONS  (PRN):  diphenhydrAMINE Injectable 12.5 milliGRAM(s) IV Push every 4 hours PRN Pruritus  HYDROmorphone  Injectable 0.25 milliGRAM(s) IV Push every 3 hours PRN Severe Breakthrough Pain (7 - 10)  hydromorphone (10 MICROgram(s)/mL) + bupivacaine 0.0625% in 0.9% Sodium Chloride PCEA Rescue Clinician  Bolus 5 milliLiter(s) Epidural every 15 minutes PRN for Pain Score greater than 6  naloxone Injectable 0.1 milliGRAM(s) IV Push every 3 minutes PRN For ANY of the following changes in patient status:  A. RR LESS THAN 10 breaths per minute, B. Oxygen saturation LESS THAN 90%, C. Sedation score of 6  ondansetron Injectable 4 milliGRAM(s) IV Push every 6 hours PRN Nausea        Physical exam:                           General:               Pt is awake, alert,  appears to be in pain but not in distress                              Eyes:                     Sclera white, Conjunctiva normal, Eyelids normal, Pupils symmetrical and reactive                                               Neuro:                  Nonfocal                             Psych:                   A&Ox3                          Cardiovascular:   S1 & S2, regular                           Respiratory:         Air entry is fair and equal on both sides, has  conducted sounds mostly on the right side.                           GI:                          Soft, nondistended and nontender, Bowel sounds active                            Ext:                        Right arm is red and swollen around IV. No cyanosis or edema or LE                            Labs:                                                                           10.4   9.73  )-----------( 158      ( 2022 07:20 )             32.9             06-20    137  |  103  |  13  ----------------------------<  122<H>  3.9   |  22  |  0.71    Ca    7.8<L>      2022 04:36  Phos  1.6     06-20  Mg     1.70     06-20    TPro  5.9<L>  /  Alb  3.1<L>  /  TBili  1.2  /  DBili  x   /  AST  60<H>  /  ALT  46<H>  /  AlkPhos  69  06-19                  PT/INR - ( 2022 04:36 )   PT: 12.6 sec;   INR: 1.09 ratio         PTT - ( 2022 04:36 )  PTT:30.6 sec  LIVER FUNCTIONS - ( 2022 05:00 )  Alb: 3.1 g/dL / Pro: 5.9 g/dL / ALK PHOS: 69 U/L / ALT: 46 U/L / AST: 60 U/L / GGT: x           Urinalysis Basic - ( 2022 18:00 )    Color: Yellow / Appearance: Clear / S.025 / pH: x  Gluc: x / Ketone: Negative  / Bili: Negative / Urobili: <2 mg/dL   Blood: x / Protein: Trace / Nitrite: Negative   Leuk Esterase: Negative / RBC: 2 /HPF / WBC 5 /HPF   Sq Epi: x / Non Sq Epi: 1 /HPF / Bacteria: Negative          CXR:  < from: Xray Chest 1 View- PORTABLE-Routine (Xray Chest 1 View- PORTABLE-Routine in AM.) (22 @ 04:53) >  The right chest tube, patchy opacities throughout the right lung, right   pneumothorax and right subcutaneous emphysema are unchanged.    The left lung is clear.        Plan:  General: 76yFemale s/p FB, Redo RVATS, Pneumonolysis, RLL/RUL wedge , experiencing  pain with deep breathing.                             Neuro:                                         Pain control with  PCEA /  Tylenol PRN  Epidural may be d/cd today                             Cardiovascular:                                          Telemetry (medical test) - Reviewed by me today independently. Normal sinus rhythm.                                          Continue hemodynamic monitoring to prevent decompensation.    Hypotension without shock requiring pressors: Hypotension probably due to vasodilation from PCEA and fever due to phlebitis  Titrate Oneil to MAP>65                                Respiratory:                                         Postop hypoxemia requiring O2 via nasal cannula probably due to postop pain - Wean nasal cannula for goal O2sat above 92%.                                              Obtain CXR . Encourage incentive spirometry.                                                   Chest PT and frequent suctioning. Continue bronchodilators, Pulmozyme and inhaled 3% saline inhalations.                                                      OOB to chair & ambulate w/ assistance.                                                           Continuous pulse oximetry for support & to prevent decompensation.                                         Monitor chest tube output                                         Chest tube to water seal                                                                                            GI                                         On  DASH  diet as tolerated                                         Continue Zofran / Reglan for nausea - PRN                                         Continue bowel regimen	                                                                 Renal:                                         Continue LR  30cc/hr     Hypophosphatemia - Supplement with IV and PO phos                                         Monitor I/Os and electrolytes                                                                                        Hem/ Onc:                                         DVT prophylaxis with SQ Heparin and SCDs.                                         Monitor chest tube output &  signs of bleeding.                                          Follow CBC in AM                           Infectious disease:      Febrile without leukocytosis - Probably due to right arm phlebitis.   Right arm IV d/cd                                          Monitor for fever / leukocytosis and continue antibiotics                                          All surgical incision / chest tube  sites look clean.                             Endocrine                                            Continue Accu-Checks with coverage                                               Pertinent clinical, laboratory, radiographic, hemodynamic, echocardiographic, respiratory data, microbiologic data and chart were reviewed and analyzed frequently throughout the course of the day and night.     Patient seen, examined and plan discussed with CT Surgeon Dr. Bravo / CTICU team during rounds.    OOB to chair and ambulate as tolerated.     Status discussed with patient and pt's daughter at bedside updated plan of care.     I have spent  40  minutes of critical care time with this pt between  7am and 11.59pm monitoring hemodynamic status, managing fluid resuscitation /  pressors to prevent shock.           Antoine Abarca MD

## 2022-06-20 NOTE — PROGRESS NOTE ADULT - SUBJECTIVE AND OBJECTIVE BOX
Anesthesia Pain Management Service: Day _4_ of Epidural    SUBJECTIVE: Patient doing well with PCEA and no problems.  Pain Scale Score: 0/10  Refer to charted pain scores    THERAPY:  [x ] Epidural Bupivacaine 0.0625% and Hydromorphone  		[X ] 10 micrograms/mL	[ ] 5 micrograms/mL  [ ] Epidural Bupivacaine 0.0625% and Fentanyl - 2 micrograms/mL  [ ] Epidural Ropivacaine 0.1% plain – 1 mg/mL  [ ] Patient Controlled Regional Anesthesia (PCRA) Ropivacaine  		[ ] 0.2%			[ ] 0.1%    Demand dose __3_ lockout __15_ (minutes) Continuous Rate _2_ml/hr_ Total: _49.45__ Daily      MEDICATIONS  (STANDING):  acetaminophen   IVPB .. 1000 milliGRAM(s) IV Intermittent once  albuterol/ipratropium for Nebulization 3 milliLiter(s) Nebulizer every 6 hours  atorvastatin 10 milliGRAM(s) Oral at bedtime  bisacodyl Suppository 10 milliGRAM(s) Rectal once  dornase little Solution 2.5 milliGRAM(s) Inhalation daily  heparin   Injectable 5000 Unit(s) SubCutaneous every 12 hours  lactated ringers. 1000 milliLiter(s) (30 mL/Hr) IV Continuous <Continuous>  metoclopramide Injectable 10 milliGRAM(s) IV Push three times a day  pantoprazole    Tablet 40 milliGRAM(s) Oral before breakfast  phenylephrine    Infusion 0.4 MICROgram(s)/kG/Min (8.64 mL/Hr) IV Continuous <Continuous>  piperacillin/tazobactam IVPB.. 3.375 Gram(s) IV Intermittent every 8 hours  polyethylene glycol 3350 17 Gram(s) Oral daily  potassium phosphate / sodium phosphate Powder (PHOS-NaK) 1 Packet(s) Oral three times a day  vancomycin  IVPB 1000 milliGRAM(s) IV Intermittent once    MEDICATIONS  (PRN):  HYDROmorphone  Injectable 0.25 milliGRAM(s) IV Push every 3 hours PRN Severe Breakthrough Pain (7 - 10)  HYDROmorphone  Injectable 0.3 milliGRAM(s) IV Push every 3 hours PRN Severe Breakthrough  Pain (7 - 10)  naloxone Injectable 0.1 milliGRAM(s) IV Push every 3 minutes PRN For ANY of the following changes in patient status:  A. RR LESS THAN 10 breaths per minute, B. Oxygen saturation LESS THAN 90%, C. Sedation score of 6  ondansetron Injectable 4 milliGRAM(s) IV Push every 6 hours PRN Nausea  oxyCODONE    IR 5 milliGRAM(s) Oral every 3 hours PRN Moderate Pain (4 - 6)  oxyCODONE    IR 7.5 milliGRAM(s) Oral every 3 hours PRN Severe Pain (7 - 10)      OBJECTIVE:  Patient is sitting up in bed.     Assessment of Catheter Site:	[ ] Left	[ ] Right  [x ] Epidural 	[ ] Femoral	      [ ] Saphenous   [ ] Supraclavicular   [ ] Other:    [x ] Dressing intact	[x ] Site non-tender	[ x] Site without erythema, discharge, edema  [x ] Epidural tubing and connection checked	[x] Gross neurological exam within normal limits  [X ] Catheter removed – tip intact		[ ] Afebrile	  [ ] Febrile: ___       [ X] see Temp under VS below)    PT/INR - ( 20 Jun 2022 04:36 )   PT: 12.6 sec;   INR: 1.09 ratio         PTT - ( 20 Jun 2022 04:36 )  PTT:30.6 sec                      10.4   9.73  )-----------( 158      ( 20 Jun 2022 07:20 )             32.9     Vital Signs Last 24 Hrs  T(C): 37.8 (06-20-22 @ 08:00), Max: 38.6 (06-19-22 @ 15:00)  T(F): 100.1 (06-20-22 @ 08:00), Max: 101.5 (06-19-22 @ 15:00)  HR: 86 (06-20-22 @ 12:00) (65 - 101)  BP: 104/48 (06-20-22 @ 10:00) (79/61 - 148/58)  BP(mean): 65 (06-20-22 @ 10:00) (57 - 94)  RR: 21 (06-20-22 @ 12:00) (14 - 26)  SpO2: 95% (06-20-22 @ 12:00) (93% - 100%)      Sedation Score:	[x ] Alert	[ ] Drowsy	[ ] Arousable	[ ] Asleep	[ ] Unresponsive    Side Effects:	[x ] None	[ ] Nausea	[ ] Vomiting	[ ] Pruritus  		[ ] Weakness		[ ] Numbness	[ ] Other:    ASSESSMENT/ PLAN:    Therapy to  be:	[ ] Continue   [ X] Discontinued   [ X] Change to prn Analgesics    Documentation and Verification of current medications:  [ X ] Done	[ ] Not done, not eligible, reason:    Comments: PCEA discontinued as per request of team and changed to IV/oral opioid and/or non-opioid Adjuvant analgesics to be used at this point.    Progress Note written now but Patient was seen earlier.

## 2022-06-20 NOTE — PROGRESS NOTE ADULT - SUBJECTIVE AND OBJECTIVE BOX
Day __3_ of Anesthesia Pain Management Service    SUBJECTIVE:    Therapy:	  [ ] IV PCA	   [ x] Epidural           [ ] s/p Spinal Opoid              [ ] Postpartum infusion	  [ ] Patient controlled regional anesthesia (PCRA)    [ ] prn Analgesics    OBJECTIVE:   [x ] No new signs     [ ] Other:    Side Effects:  [ x] None			[ ] Other:    Assessment of Catheter Site:		[ x] Intact		[ ] Other:    ASSESSMENT/PLAN  [ ] Continue current therapy    [x] Therapy changed to:    [ ] IV PCA       [ ] Epidural     [ x] prn Analgesics     Comments:

## 2022-06-21 ENCOUNTER — TRANSCRIPTION ENCOUNTER (OUTPATIENT)
Age: 77
End: 2022-06-21

## 2022-06-21 VITALS
OXYGEN SATURATION: 100 % | DIASTOLIC BLOOD PRESSURE: 59 MMHG | SYSTOLIC BLOOD PRESSURE: 129 MMHG | HEART RATE: 103 BPM | TEMPERATURE: 100 F | RESPIRATION RATE: 30 BRPM

## 2022-06-21 LAB
ANION GAP SERPL CALC-SCNC: 8 MMOL/L — SIGNIFICANT CHANGE UP (ref 7–14)
BUN SERPL-MCNC: 8 MG/DL — SIGNIFICANT CHANGE UP (ref 7–23)
CALCIUM SERPL-MCNC: 7.7 MG/DL — LOW (ref 8.4–10.5)
CHLORIDE SERPL-SCNC: 108 MMOL/L — HIGH (ref 98–107)
CO2 SERPL-SCNC: 23 MMOL/L — SIGNIFICANT CHANGE UP (ref 22–31)
CREAT SERPL-MCNC: 0.63 MG/DL — SIGNIFICANT CHANGE UP (ref 0.5–1.3)
EGFR: 92 ML/MIN/1.73M2 — SIGNIFICANT CHANGE UP
GLUCOSE SERPL-MCNC: 101 MG/DL — HIGH (ref 70–99)
HCT VFR BLD CALC: 29.3 % — LOW (ref 34.5–45)
HGB BLD-MCNC: 9.7 G/DL — LOW (ref 11.5–15.5)
MAGNESIUM SERPL-MCNC: 2.2 MG/DL — SIGNIFICANT CHANGE UP (ref 1.6–2.6)
MCHC RBC-ENTMCNC: 30 PG — SIGNIFICANT CHANGE UP (ref 27–34)
MCHC RBC-ENTMCNC: 33.1 GM/DL — SIGNIFICANT CHANGE UP (ref 32–36)
MCV RBC AUTO: 90.7 FL — SIGNIFICANT CHANGE UP (ref 80–100)
NRBC # BLD: 0 /100 WBCS — SIGNIFICANT CHANGE UP
NRBC # FLD: 0 K/UL — SIGNIFICANT CHANGE UP
PHOSPHATE SERPL-MCNC: 3.1 MG/DL — SIGNIFICANT CHANGE UP (ref 2.5–4.5)
PLATELET # BLD AUTO: 142 K/UL — LOW (ref 150–400)
POTASSIUM SERPL-MCNC: 4.5 MMOL/L — SIGNIFICANT CHANGE UP (ref 3.5–5.3)
POTASSIUM SERPL-SCNC: 4.5 MMOL/L — SIGNIFICANT CHANGE UP (ref 3.5–5.3)
RBC # BLD: 3.23 M/UL — LOW (ref 3.8–5.2)
RBC # FLD: 14.6 % — HIGH (ref 10.3–14.5)
SODIUM SERPL-SCNC: 139 MMOL/L — SIGNIFICANT CHANGE UP (ref 135–145)
WBC # BLD: 6.61 K/UL — SIGNIFICANT CHANGE UP (ref 3.8–10.5)
WBC # FLD AUTO: 6.61 K/UL — SIGNIFICANT CHANGE UP (ref 3.8–10.5)

## 2022-06-21 PROCEDURE — 99233 SBSQ HOSP IP/OBS HIGH 50: CPT

## 2022-06-21 PROCEDURE — 71045 X-RAY EXAM CHEST 1 VIEW: CPT | Mod: 26,76

## 2022-06-21 RX ORDER — IPRATROPIUM/ALBUTEROL SULFATE 18-103MCG
1 AEROSOL WITH ADAPTER (GRAM) INHALATION
Qty: 120 | Refills: 0
Start: 2022-06-21 | End: 2022-07-20

## 2022-06-21 RX ORDER — OXYCODONE HYDROCHLORIDE 5 MG/1
1 TABLET ORAL
Qty: 30 | Refills: 0
Start: 2022-06-21

## 2022-06-21 RX ORDER — SENNA PLUS 8.6 MG/1
2 TABLET ORAL
Qty: 0 | Refills: 0 | DISCHARGE
Start: 2022-06-21

## 2022-06-21 RX ADMIN — DORNASE ALFA 2.5 MILLIGRAM(S): 1 SOLUTION RESPIRATORY (INHALATION) at 10:43

## 2022-06-21 RX ADMIN — LIDOCAINE 1 PATCH: 4 CREAM TOPICAL at 12:20

## 2022-06-21 RX ADMIN — Medication 1000 MILLIGRAM(S): at 04:25

## 2022-06-21 RX ADMIN — PIPERACILLIN AND TAZOBACTAM 25 GRAM(S): 4; .5 INJECTION, POWDER, LYOPHILIZED, FOR SOLUTION INTRAVENOUS at 05:47

## 2022-06-21 RX ADMIN — Medication 1 TABLET(S): at 12:20

## 2022-06-21 RX ADMIN — Medication 10 MILLIGRAM(S): at 05:47

## 2022-06-21 RX ADMIN — POLYETHYLENE GLYCOL 3350 17 GRAM(S): 17 POWDER, FOR SOLUTION ORAL at 12:19

## 2022-06-21 RX ADMIN — Medication 3 MILLILITER(S): at 10:43

## 2022-06-21 RX ADMIN — Medication 3 MILLILITER(S): at 04:44

## 2022-06-21 RX ADMIN — HEPARIN SODIUM 5000 UNIT(S): 5000 INJECTION INTRAVENOUS; SUBCUTANEOUS at 05:47

## 2022-06-21 RX ADMIN — PANTOPRAZOLE SODIUM 40 MILLIGRAM(S): 20 TABLET, DELAYED RELEASE ORAL at 05:48

## 2022-06-21 RX ADMIN — Medication 400 MILLIGRAM(S): at 04:10

## 2022-06-21 NOTE — DISCHARGE NOTE NURSING/CASE MANAGEMENT/SOCIAL WORK - NSDCPEFALRISK_GEN_ALL_CORE
For information on Fall & Injury Prevention, visit: [email or web address] OR  [email or web address] OR  https://www.cdc.gov/steadi/patient.html

## 2022-06-21 NOTE — PROGRESS NOTE ADULT - SUBJECTIVE AND OBJECTIVE BOX
HOSEA GRAY                     [id number]    HPI:  76 year old former smoker , PMH negro esophagus, HTN, HLD,  lung cancer stage 1 s/p RUL resection in 2014 ([facility]) .  had abnormal routine CT of the chest. now presents to [facility] with pre op dx of malignant neoplasm of lung. Patient is scheduled for flexible bronchoscopy , right video assisted thoracoscopy, right thoracotomy with epidural, right upper possible lower lobe wedge resection with interventional radiology marking on 6/15/22      Procedure: FB, Redo RVATS, Pneumonolysis, RLL/RUL wedge                       Issues:              Hypotension without shock requiring pressors  Right arm Phlebitis  Lung nodule              Postop pain              Chest tube in place  Negro esophagus / GERD  HLD  Hypophosphatemia    PAST MEDICAL & SURGICAL HISTORY:  HTN (Hypertension)      Dyslipidemia      Lumbar Spinal Stenosis      Acid Reflux      Ovarian Cyst      Obesity      Lung nodules      Negro&#x27;s esophagus      CREST syndrome      S/P Arthroscopy of Right Knee      Benign Cyst of Breast  left      H/O bilateral oophorectomy      History of lung surgery  RUL resection in [facility]                 VITAL SIGNS:  Vital Signs Last 24 Hrs  T(C): 37.5 (2022 12:00), Max: 37.6 (2022 16:00)  T(F): 99.5 (2022 12:00), Max: 99.7 (2022 16:00)  HR: 82 (2022 11:00) (63 - 103)  BP: 122/58 (2022 11:00) (94/42 - 152/70)  BP(mean): 76 (2022 11:00) (58 - 91)  RR: 34 (2022 11:00) (13 - 34)  SpO2: 100% (2022 11:00) (93% - 100%)    I/Os:   I&O's Detail    2022 07:01  -  2022 07:00  --------------------------------------------------------  IN:    IV PiggyBack: 1000 mL    Lactated Ringers: 720 mL    Oral Fluid: 720 mL    Phenylephrine: 23.7 mL    Sodium Chloride 0.9% Bolus: 250 mL  Total IN: 2713.7 mL    OUT:    Chest Tube (mL): 100 mL    Voided (mL): 4000 mL  Total OUT: 4100 mL    Total NET: -1386.3 mL      2022 07:01  -  2022 12:09  --------------------------------------------------------  IN:    Lactated Ringers: 90 mL  Total IN: 90 mL    OUT:    Chest Tube (mL): 30 mL    Voided (mL): 1000 mL  Total OUT: 1030 mL    Total NET: -940 mL          CAPILLARY BLOOD GLUCOSE          =======================MEDICATIONS===================  MEDICATIONS  (STANDING):  albuterol/ipratropium for Nebulization 3 milliLiter(s) Nebulizer every 6 hours  amoxicillin  875 milliGRAM(s)/clavulanate 1 Tablet(s) Oral once  atorvastatin 10 milliGRAM(s) Oral at bedtime  dornase little Solution 2.5 milliGRAM(s) Inhalation daily  heparin   Injectable 5000 Unit(s) SubCutaneous every 12 hours  lactated ringers. 1000 milliLiter(s) (30 mL/Hr) IV Continuous <Continuous>  lidocaine   4% Patch 1 Patch Transdermal daily  pantoprazole    Tablet 40 milliGRAM(s) Oral before breakfast  phenylephrine    Infusion 0.4 MICROgram(s)/kG/Min (8.64 mL/Hr) IV Continuous <Continuous>  piperacillin/tazobactam IVPB.. 3.375 Gram(s) IV Intermittent every 8 hours  polyethylene glycol 3350 17 Gram(s) Oral daily  senna 2 Tablet(s) Oral at bedtime    MEDICATIONS  (PRN):  acetaminophen   IVPB .. 1000 milliGRAM(s) IV Intermittent once PRN Temp greater or equal to 38C (100.4F), Mild Pain (1 - 3)  bisacodyl Suppository 10 milliGRAM(s) Rectal daily PRN Constipation  HYDROmorphone  Injectable 0.25 milliGRAM(s) IV Push every 3 hours PRN Severe Breakthrough Pain (7 - 10)  HYDROmorphone  Injectable 0.3 milliGRAM(s) IV Push every 3 hours PRN Severe Breakthrough  Pain (7 - 10)  naloxone Injectable 0.1 milliGRAM(s) IV Push every 3 minutes PRN For ANY of the following changes in patient status:  A. RR LESS THAN 10 breaths per minute, B. Oxygen saturation LESS THAN 90%, C. Sedation score of 6  ondansetron Injectable 4 milliGRAM(s) IV Push every 6 hours PRN Nausea  oxyCODONE    IR 5 milliGRAM(s) Oral every 3 hours PRN Moderate Pain (4 - 6)  oxyCODONE    IR 7.5 milliGRAM(s) Oral every 3 hours PRN Severe Pain (7 - 10)        PHYSICAL EXAM============================  General:                         Awake, alert, not in any distress  Neuro:                            Moving all extremities to commands.   Respiratory:	Air entry fair and  bilateral conducted sounds                                           Effort even and unlabored.  CV:		Regular rate and rhythm. Normal S1/S2                                          Distal pulses present.  Abdomen:	                     Soft, non-distended. Bowel sounds present   Skin:		No rash.  Extremities:	Warm, no cyanosis or edema.  Palpable pulses    ============================LABS=========================                        9.7    6.61  )-----------( 142      ( 2022 04:45 )             29.3     06-21    139  |  108<H>  |  8   ----------------------------<  101<H>  4.5   |  23  |  0.63    Ca    7.7<L>      2022 04:45  Phos  3.1     06-21  Mg     2.20     06-21        PT/INR - ( 2022 04:36 )   PT: 12.6 sec;   INR: 1.09 ratio         PTT - ( 2022 04:36 )  PTT:30.6 sec    Urinalysis Basic - ( 2022 18:00 )    Color: Yellow / Appearance: Clear / S.025 / pH: x  Gluc: x / Ketone: Negative  / Bili: Negative / Urobili: <2 mg/dL   Blood: x / Protein: Trace / Nitrite: Negative   Leuk Esterase: Negative / RBC: 2 /HPF / WBC 5 /HPF   Sq Epi: x / Non Sq Epi: 1 /HPF / Bacteria: Negative        A/P:  76yFemale s/p FB, Redo RVATS, Pneumonolysis, RLL/RUL wedge , experiencing  pain with deep breathing.                             Neuro:                                         Pain control with  IV Tylenol PRN  Epidural may be d/cd today                             Cardiovascular:                                          Telemetry (medical test) - Reviewed by me today independently. Normal sinus rhythm.                                          Continue hemodynamic monitoring to prevent decompensation.                                  Respiratory:                                         Postop hypoxemia requiring O2 via nasal cannula probably due to postop pain - Wean nasal cannula for goal O2sat above 92%.                                              Obtain CXR . Encourage incentive spirometry.                                                   Chest PT and frequent suctioning. Continue bronchodilators, Pulmozyme and inhaled 3% saline inhalations.                                                      OOB to chair & ambulate w/ assistance.                                                           Continuous pulse oximetry for support & to prevent decompensation.                                         Monitor chest tube output                                         Chest tube to water seal                                                                                            GI                                         On  DASH  diet as tolerated                                         Continue Zofran / Reglan for nausea - PRN                                         Continue bowel regimen	                                                                 Renal:                                              Hypophosphatemia - Supplement with IV and PO phos                                         Monitor I/Os and electrolytes                                                                                        Hem/ Onc:                                         DVT prophylaxis with SQ Heparin and SCDs.                                         Monitor chest tube output &  signs of bleeding.                                          Follow CBC in AM                           Infectious disease:      Febrile without leukocytosis                                             Monitor for fever / leukocytosis and continue antibiotics                                          All surgical incision / chest tube  sites look clean.                             Endocrine                                            Continue Accu-Checks with coverage                                               Pertinent clinical, laboratory, radiographic, hemodynamic, echocardiographic, respiratory data, microbiologic data and chart were reviewed and analyzed frequently throughout the course of the day and night.     Patient seen, examined and plan discussed with CT Surgeon Dr. Bravo / CTICU team during rounds. Discharge home as per Dr. Suazo, explained to daughter whom a NP, will stay with patient      OOB to chair and ambulate as tolerated.     Status discussed with patient and pt's daughter at bedside updated plan of care.     I have spent  30 minutes at bedside  monitoring hemodynamic status, managing Post op care        Ha Monsivais DO, FACEP

## 2022-06-21 NOTE — DISCHARGE NOTE PROVIDER - NSDCFUADDINST_GEN_ALL_CORE_FT
Follow up with Dr. Bravo in 12-14 days. Call Thoracic Surgery office [phone] to schedule an appointment. Please, obtain a CXR 1-2 days prior to your appointment and bring a copy with you.  Please, make an appointment with your PMD within 7 days.  Blue Stitch will be removed by Dr. Bravo in the office.  You may shower in 24 hours with dressing and remove in the shower. Keep the wound clean and dry it well after showering.  It’s OK if some fluid comes out of the chest tube hole unless blood or purulent.  No Driving while taking pain meds. Some coughing and discomfort is expected.

## 2022-06-21 NOTE — DISCHARGE NOTE PROVIDER - NPI NUMBER (FOR SYSADMIN USE ONLY) :
Personalized Preventive Plan for Mabel Medeirosh - 2/21/2022  Medicare offers a range of preventive health benefits. Some of the tests and screenings are paid in full while other may be subject to a deductible, co-insurance, and/or copay. Some of these benefits include a comprehensive review of your medical history including lifestyle, illnesses that may run in your family, and various assessments and screenings as appropriate. After reviewing your medical record and screening and assessments performed today your provider may have ordered immunizations, labs, imaging, and/or referrals for you. A list of these orders (if applicable) as well as your Preventive Care list are included within your After Visit Summary for your review. Other Preventive Recommendations:    · A preventive eye exam performed by an eye specialist is recommended every 1-2 years to screen for glaucoma; cataracts, macular degeneration, and other eye disorders. · A preventive dental visit is recommended every 6 months. · Try to get at least 150 minutes of exercise per week or 10,000 steps per day on a pedometer . · Order or download the FREE \"Exercise & Physical Activity: Your Everyday Guide\" from [employer]. Call 3-885.502.4165 or search [employer] online. · You need 7554-6008 mg of calcium and 4982-6979 IU of vitamin D per day. It is possible to meet your calcium requirement with diet alone, but a vitamin D supplement is usually necessary to meet this goal.  · When exposed to the sun, use a sunscreen that protects against both UVA and UVB radiation with an SPF of 30 or greater. Reapply every 2 to 3 hours or after sweating, drying off with a towel, or swimming. · Always wear a seat belt when traveling in a car. Always wear a helmet when riding a bicycle or motorcycle.
[[id number]]

## 2022-06-21 NOTE — DISCHARGE NOTE PROVIDER - NSDCMRMEDTOKEN_GEN_ALL_CORE_FT
amlodipine-benazepril 5 mg-10 mg oral capsule: 1 cap(s) orally once a day  amoxicillin-clavulanate 875 mg-125 mg oral tablet: 1 cap(s) orally every 12 hours MDD:2  Calcium 600+D oral tablet: 1 tab(s) orally 1 times a day  Combivent Respimat 20 mcg-100 mcg/inh inhalation aerosol: 1 puff(s) inhaled 4 times a day, As Needed -for shortness of breath and/or wheezing   CXR (PA &amp; Lat)  Please, obtain 1-2 days prior to your f/u visit with Dr. Bravo and bring it with you to your appointment.:   famotidine 40 mg oral tablet: 1 tab(s) orally once a day (at bedtime)  Lipitor 10 mg oral tablet: 1 tab(s) orally once a day  omeprazole 40 mg oral delayed release capsule: 1 cap(s) orally once a day  oxyCODONE 5 mg oral tablet: 1 tab(s) orally every 3 hours, As Needed -Moderate Pain (4 - 6) - for severe pain MDD:5  senna oral tablet: 2 tab(s) orally once a day (at bedtime)  Vitamin B-12 1000 mcg oral tablet: 1 tab(s) orally once a day  Vitamin D3 1000 intl units (25 mcg) oral capsule:

## 2022-06-21 NOTE — DISCHARGE NOTE PROVIDER - HOSPITAL COURSE
76 year old former smoker , PMH negro esophagus, HTN, HLD,  lung cancer stage 1 s/p RUL resection in 2014 ([facility]) .  had abnormal routine CT of the chest. now presents to [facility] with pre op dx of malignant neoplasm of lung. Patient is scheduled for flexible bronchoscopy , right video assisted thoracoscopy, right thoracotomy with epidural, right upper possible lower lobe wedge resection with interventional radiology marking on 6/15/22.  Patient underwent R VATS/RUL/RLL wedge resections on 6/17/22.  Her postoperative course was complicated by small air leak that eventually resolved. and she was discharged home on 6/21/22 with a 10-day course of antibiotic for suspected RLL PNA.

## 2022-06-21 NOTE — DISCHARGE NOTE PROVIDER - CARE PROVIDER_API CALL
Derek Bravo)  Surgery; Thoracic Surgery  [phone] [place] [address], [place] [address]  Phone: [phone]  Fax: [phone]  Follow Up Time:

## 2022-06-21 NOTE — PROGRESS NOTE ADULT - PROVIDER SPECIALTY LIST ADULT
Critical Care
Pain Medicine
Pain Medicine
Critical Care
Pain Medicine
Pain Medicine

## 2022-06-21 NOTE — DISCHARGE NOTE NURSING/CASE MANAGEMENT/SOCIAL WORK - PATIENT PORTAL LINK FT
You can access the FollowMyHealth Patient Portal offered by [facility] by registering at the following website: [email or web address]. By joining [facility]’s FollowMyHealth portal, you will also be able to view your health information using other applications (apps) compatible with our system.

## 2022-06-21 NOTE — DISCHARGE NOTE PROVIDER - NSDCCPTREATMENT_GEN_ALL_CORE_FT
PRINCIPAL PROCEDURE  Procedure: Wedge resection, lung, using VATS  Findings and Treatment: RUL wedge; RLL wedge x2      SECONDARY PROCEDURE  Procedure: Thoracoscopic pneumolysis  Findings and Treatment:

## 2022-06-21 NOTE — DISCHARGE NOTE PROVIDER - NSDCCPCAREPLAN_GEN_ALL_CORE_FT
PRINCIPAL DISCHARGE DIAGNOSIS  Diagnosis: Multiple lung nodules  Assessment and Plan of Treatment:

## 2022-06-24 LAB
CULTURE RESULTS: SIGNIFICANT CHANGE UP
SPECIMEN SOURCE: SIGNIFICANT CHANGE UP

## 2022-06-28 ENCOUNTER — APPOINTMENT (OUTPATIENT)
Dept: CT IMAGING | Facility: IMAGING CENTER | Age: 77
End: 2022-06-28

## 2022-06-28 ENCOUNTER — OUTPATIENT (OUTPATIENT)
Dept: OUTPATIENT SERVICES | Facility: HOSPITAL | Age: 77
LOS: 1 days | End: 2022-06-28
Payer: MEDICARE

## 2022-06-28 ENCOUNTER — OUTPATIENT (OUTPATIENT)
Dept: OUTPATIENT SERVICES | Facility: HOSPITAL | Age: 77
LOS: 1 days | End: 2022-06-28

## 2022-06-28 ENCOUNTER — RESULT REVIEW (OUTPATIENT)
Age: 77
End: 2022-06-28

## 2022-06-28 ENCOUNTER — APPOINTMENT (OUTPATIENT)
Dept: THORACIC SURGERY | Facility: CLINIC | Age: 77
End: 2022-06-28

## 2022-06-28 ENCOUNTER — APPOINTMENT (OUTPATIENT)
Dept: RADIOLOGY | Facility: HOSPITAL | Age: 77
End: 2022-06-28

## 2022-06-28 VITALS
BODY MASS INDEX: 24.98 KG/M2 | DIASTOLIC BLOOD PRESSURE: 70 MMHG | HEART RATE: 82 BPM | WEIGHT: 119 LBS | OXYGEN SATURATION: 98 % | HEIGHT: 58 IN | RESPIRATION RATE: 17 BRPM | SYSTOLIC BLOOD PRESSURE: 122 MMHG

## 2022-06-28 DIAGNOSIS — R91.8 OTHER NONSPECIFIC ABNORMAL FINDING OF LUNG FIELD: ICD-10-CM

## 2022-06-28 DIAGNOSIS — Z90.722 ACQUIRED ABSENCE OF OVARIES, BILATERAL: Chronic | ICD-10-CM

## 2022-06-28 DIAGNOSIS — Z98.890 OTHER SPECIFIED POSTPROCEDURAL STATES: Chronic | ICD-10-CM

## 2022-06-28 PROCEDURE — 71250 CT THORAX DX C-: CPT | Mod: MH

## 2022-06-28 PROCEDURE — 71046 X-RAY EXAM CHEST 2 VIEWS: CPT | Mod: 26

## 2022-06-28 PROCEDURE — 99024 POSTOP FOLLOW-UP VISIT: CPT

## 2022-06-28 PROCEDURE — 71250 CT THORAX DX C-: CPT | Mod: 26,MH

## 2022-07-01 LAB — SURGICAL PATHOLOGY STUDY: SIGNIFICANT CHANGE UP

## 2022-07-05 ENCOUNTER — NON-APPOINTMENT (OUTPATIENT)
Age: 77
End: 2022-07-05

## 2022-07-05 ENCOUNTER — APPOINTMENT (OUTPATIENT)
Dept: THORACIC SURGERY | Facility: CLINIC | Age: 77
End: 2022-07-05

## 2022-07-05 VITALS
RESPIRATION RATE: 18 BRPM | HEIGHT: 59 IN | HEART RATE: 87 BPM | SYSTOLIC BLOOD PRESSURE: 113 MMHG | BODY MASS INDEX: 23.99 KG/M2 | WEIGHT: 119 LBS | OXYGEN SATURATION: 98 % | DIASTOLIC BLOOD PRESSURE: 74 MMHG

## 2022-07-05 PROCEDURE — 99024 POSTOP FOLLOW-UP VISIT: CPT

## 2022-07-12 ENCOUNTER — OUTPATIENT (OUTPATIENT)
Dept: OUTPATIENT SERVICES | Facility: HOSPITAL | Age: 77
LOS: 1 days | Discharge: HOME | End: 2022-07-12

## 2022-07-12 DIAGNOSIS — C34.90 MALIGNANT NEOPLASM OF UNSPECIFIED PART OF UNSPECIFIED BRONCHUS OR LUNG: ICD-10-CM

## 2022-07-12 DIAGNOSIS — Z98.890 OTHER SPECIFIED POSTPROCEDURAL STATES: Chronic | ICD-10-CM

## 2022-07-12 DIAGNOSIS — Z90.722 ACQUIRED ABSENCE OF OVARIES, BILATERAL: Chronic | ICD-10-CM

## 2022-07-13 ENCOUNTER — APPOINTMENT (OUTPATIENT)
Dept: HEMATOLOGY ONCOLOGY | Facility: CLINIC | Age: 77
End: 2022-07-13

## 2022-07-13 DIAGNOSIS — K22.710 BARRETT'S ESOPHAGUS WITH LOW GRADE DYSPLASIA: ICD-10-CM

## 2022-07-13 DIAGNOSIS — Z87.891 PERSONAL HISTORY OF NICOTINE DEPENDENCE: ICD-10-CM

## 2022-07-13 PROCEDURE — 99205 OFFICE O/P NEW HI 60 MIN: CPT | Mod: 95

## 2022-07-13 NOTE — CONSULT LETTER
[Dear  ___] : Dear  [unfilled], [Consult Letter:] : I had the pleasure of evaluating your patient, [unfilled]. [Please see my note below.] : Please see my note below. [Consult Closing:] : Thank you very much for allowing me to participate in the care of this patient.  If you have any questions, please do not hesitate to contact me. [Sincerely,] : Sincerely, [FreeTextEntry2] : Dr. Derek Bravo [FreeTextEntry3] : Jose Kirkland MD\par \par  [DrGeovani  ___] : Dr. FITZPATRICK

## 2022-07-13 NOTE — HISTORY OF PRESENT ILLNESS
[Home] : at home, [unfilled] , at the time of the visit. [Medical Office: ([facility])___] : at the medical office located in  [Family Member] : family member [Verbal consent obtained from patient] : the patient, [unfilled] [Disease: _____________________] : Disease: [unfilled] [T: ___] : T[unfilled] [N: ___] : N[unfilled] [M: ___] : M[unfilled] [AJCC Stage: ____] : AJCC Stage: [unfilled] [de-identified] : Ms. HOSEA GRAY, 76 year old female, former smoker (3-4PPDx 15 years; Quit in 1980's), w/ hx of History of Johnson's esophagus; lung cancer, status post RUL resection and RLL superior segmentectomy 2/25/2014 at [facility] (Stage I lung cancer?) (saw oncology at that time, was told no adjuvant not needed), CREST syndrome (sees Dr. Valdovinos for this), Has been undergoing active surveillance with serial CTs. Most recent imaging with New, right lung nodules. Referred to Dr. Bravo. Recent work up as below:\par \par July, 2020: New finding in right lower lobe possible endobronchial lesion versus mucoid impaction. MRI did not demonstrate any conclusive findings.8/2020: s/p BAL. Path negative \par \par PFTs on 3/8/22: FVC: 1.78 (84%); FEV1: 1.25 (78%); DLCO: 10.42 (59%) \par \par CT Chest on 2/28/22: ([facility])\par - Stable post op changes within the right hemithorax\par - Evolving, irregular, noncalcified semisolid RUL nodule: 9mm (Series 11, Image 134). Solid component measures 5mm. Solid component not present on CT from 9/4/15 and now solid component is more prominent with compared to CT from 8/24/21. The overall size of the lesion has increased from 9/4/15. \par - Additional subcentimeter noncalcified solid and groundglass pulmonary nodules are redemonstrated and unchanged. \par - Tubular branching soft tissue is again demonstrate within the posterior RLL likely representing mucoid impaction, unchanged. \par - Stable, tubular branching soft tissue density is present within the periphery of the left lower lobe as well, unchanged, again, favor mucoid impaction. \par - Left adrenal myolipoma \par \par PET/CT on 3/11/22: ([facility])\par - Non - FDG avid 9 x 7 mm irregular nodule in RUL (image 33)\par - New 1.2 x 1 cm nodular opacity in the RLL at the postsurgical site (Series 4, image 26) SUV 1.3 \par - Tubular branching soft tissue in the RUL without hypermetabolic uptake, likely mucoid impaction; 4 mm Peripheral nodule in the LL (Image 48)\par - No evidence of FDG avid lymphadenopathy \par \par Patient was given a course of ABX on 04/05/2022 for 7 days. \par \par CT chest on 05/23/2022:\par - Along the superior aspect of the right lower lobe suture is a 1.2 x 1.0 cm solid nodule series 10 image 123 which is unchanged from prior PET/CT but new from 2/28/2022. This was FDG avid.\par - Clustered nodules and tubular presumed airway impaction inferior to this nodule in the right lower lobe is unchanged.\par - 9 x 7 mm part solid nodule in the right upper lobe on series 6 image 135 is unchanged since 2/28/2022 but increased in size and density since 2/2/2021.\par - Other small solid and some solid nodules are stable, annotated series 10. Airway impaction and centrilobular nodules in the basilar left lower lobe are unchanged, series 10 image 241.\par \par Now, s/p Flexible bronchoscopy, redo uniportal right video-assisted thoracic surgery, pneumonolysis, wedge resection of right upper lobe x1 and right lower lobe x2, and mediastinal lymph node dissection. Path of RUL wedge resection revealed adenocarcinoma, acinar predominant, 0.8 x 0.8 x 0.5 cm, G2, pT1a N0. RLL wedge resection revealed nonnecrotizing granulomas and focal necrotizing. Additional Findings: Emphysema. Metallic coil identified in part # 1 Special stains: AFB stain is negative for acid fast bacilli. GMS stain is negative for fungal organisms. Postoperative course was complicated by small air leak that eventually resolved. She was discharged home on 6/21/22 with a 10-day course of antibiotic for suspected RLL PNA. Contracted Hives on Day 5 of Augmenting. Contacted service line and was advised to d/c.\par \par Patient was seen on 06/28/2022. CXR showed Moderate right pleural effusion, recommended to RTC in 1 week with repeat CT Chest to further evaluate as well as to discuss final path. \par \par Patient presents today for follow up. Today, endorses good pain control. No worsening shortness of breath upon exertion. Today, patient denies chest pain, cough, hemoptysis, fever, chills, night sweats, lightheadedness or dizziness.\par \par \par CT chest on 06/28/2022: Stable post op findings. \par \par Today, I saw pt for a consultation visit, via tele-med. \par Pt reports doing well. She has some soreness at the site of surgery but no other symptoms.  [de-identified] : adeno ca

## 2022-07-13 NOTE — HISTORY OF PRESENT ILLNESS
[Disease: _____________________] : Disease: [unfilled] [T: ___] : T[unfilled] [N: ___] : N[unfilled] [M: ___] : M[unfilled] [AJCC Stage: ____] : AJCC Stage: [unfilled] [de-identified] : Ms. HOSEA GRAY, 76 year old female, former smoker (3-4PPDx 15 years; Quit in 1980's), w/ hx of History of Johnson's esophagus; lung cancer, status post RUL resection and RLL superior segmentectomy 2/25/2014 at [facility] (Stage I lung cancer?) (saw oncology at that time, was told no adjuvant not needed), CREST syndrome (sees Dr. Valdovinos for this), Has been undergoing active surveillance with serial CTs. Most recent imaging with New, right lung nodules. Referred to Dr. Bravo. Recent work up as below:\par \par July, 2020: New finding in right lower lobe possible endobronchial lesion versus mucoid impaction. MRI did not demonstrate any conclusive findings.8/2020: s/p BAL. Path negative \par \par PFTs on 3/8/22: FVC: 1.78 (84%); FEV1: 1.25 (78%); DLCO: 10.42 (59%) \par \par CT Chest on 2/28/22: ([facility])\par - Stable post op changes within the right hemithorax\par - Evolving, irregular, noncalcified semisolid RUL nodule: 9mm (Series 11, Image 134). Solid component measures 5mm. Solid component not present on CT from 9/4/15 and now solid component is more prominent with compared to CT from 8/24/21. The overall size of the lesion has increased from 9/4/15. \par - Additional subcentimeter noncalcified solid and groundglass pulmonary nodules are redemonstrated and unchanged. \par - Tubular branching soft tissue is again demonstrate within the posterior RLL likely representing mucoid impaction, unchanged. \par - Stable, tubular branching soft tissue density is present within the periphery of the left lower lobe as well, unchanged, again, favor mucoid impaction. \par - Left adrenal myolipoma \par \par PET/CT on 3/11/22: ([facility])\par - Non - FDG avid 9 x 7 mm irregular nodule in RUL (image 33)\par - New 1.2 x 1 cm nodular opacity in the RLL at the postsurgical site (Series 4, image 26) SUV 1.3 \par - Tubular branching soft tissue in the RUL without hypermetabolic uptake, likely mucoid impaction; 4 mm Peripheral nodule in the LL (Image 48)\par - No evidence of FDG avid lymphadenopathy \par \par Patient was given a course of ABX on 04/05/2022 for 7 days. \par \par CT chest on 05/23/2022:\par - Along the superior aspect of the right lower lobe suture is a 1.2 x 1.0 cm solid nodule series 10 image 123 which is unchanged from prior PET/CT but new from 2/28/2022. This was FDG avid.\par - Clustered nodules and tubular presumed airway impaction inferior to this nodule in the right lower lobe is unchanged.\par - 9 x 7 mm part solid nodule in the right upper lobe on series 6 image 135 is unchanged since 2/28/2022 but increased in size and density since 2/2/2021.\par - Other small solid and some solid nodules are stable, annotated series 10. Airway impaction and centrilobular nodules in the basilar left lower lobe are unchanged, series 10 image 241.\par \par Now, s/p Flexible bronchoscopy, redo uniportal right video-assisted thoracic surgery, pneumonolysis, wedge resection of right upper lobe x1 and right lower lobe x2, and mediastinal lymph node dissection. Path of RUL wedge resection revealed adenocarcinoma, acinar predominant, 0.8 x 0.8 x 0.5 cm, G2, pT1a N0. RLL wedge resection revealed nonnecrotizing granulomas and focal necrotizing. Additional Findings: Emphysema. Metallic coil identified in part # 1 Special stains: AFB stain is negative for acid fast bacilli. GMS stain is negative for fungal organisms. Postoperative course was complicated by small air leak that eventually resolved. She was discharged home on 6/21/22 with a 10-day course of antibiotic for suspected RLL PNA. Contracted Hives on Day 5 of Augmenting. Contacted service line and was advised to d/c.\par \par Patient was seen on 06/28/2022. CXR showed Moderate right pleural effusion, recommended to RTC in 1 week with repeat CT Chest to further evaluate as well as to discuss final path. \par \par Patient presents today for follow up. Today, endorses good pain control. No worsening shortness of breath upon exertion. Today, patient denies chest pain, cough, hemoptysis, fever, chills, night sweats, lightheadedness or dizziness.\par \par \par CT chest on 06/28/2022: Stable post op findings. \par \par Today, I saw pt for a consultation visit, via tele-med. \par Pt reports doing well. She has some soreness at the site of surgery but no other symptoms.  [de-identified] : adeno ca [Home] : at home, [unfilled] , at the time of the visit. [Medical Office: ([facility])___] : at the medical office located in  [Family Member] : family member [Verbal consent obtained from patient] : the patient, [unfilled]

## 2022-07-13 NOTE — ASSESSMENT
[FreeTextEntry1] : 75 yo w with remote heavy smoking hx, with chronic lung nodules, multiple primary lung cancers, stage I resected in 2014 and most recently Stage IA of RUL, s/p wedge resection of RUl as well as RLL nodules and mediastinal LN sampling. Tumor in RUL was stage IA and RLL ws benign- granuloma (non-necrotizng with necrotizing component). \par I reviewed the chart in its entirety, as well as images, path, labs including [id number]\par I discussed the findings in detail\par Given stage IA disease, there is no role for systemic adjuvant tx\par I discussed concurrent nodules that are likely related to known CREST syndrome. \par Pt has had 2 primary lung cancers resected and remains at a risk for developing another due to field cancerization likely related to prior smoking or chronic lung disease. However, at this time, there are no agents that have been shown to benefit as prophylactic agents.\par I also discussed optimal surveillance plan \par Pt will continue follow up with Hemant Valdovinos and Shelly\par I will see her again in 6 months\par \par

## 2022-07-14 NOTE — ASU PATIENT PROFILE, ADULT - NSTOBACCONEVERSMOKERY/N_GEN_A
Problem: Potential for Falls  Goal: Patient will remain free of falls  Description: INTERVENTIONS:  - Educate patient/family on patient safety including physical limitations  - Instruct patient to call for assistance with activity   - Consult OT/PT to assist with strengthening/mobility   - Keep Call bell within reach  - Keep bed low and locked with side rails adjusted as appropriate  - Keep care items and personal belongings within reach  - Initiate and maintain comfort rounds  - Make Fall Risk Sign visible to staff  -- Apply yellow socks and bracelet for high fall risk patients  - Consider moving patient to room near nurses station  Outcome: Progressing
Yes

## 2022-07-15 ENCOUNTER — TRANSCRIPTION ENCOUNTER (OUTPATIENT)
Age: 77
End: 2022-07-15

## 2022-07-18 ENCOUNTER — TRANSCRIPTION ENCOUNTER (OUTPATIENT)
Age: 77
End: 2022-07-18

## 2022-09-28 ENCOUNTER — APPOINTMENT (OUTPATIENT)
Dept: CT IMAGING | Facility: IMAGING CENTER | Age: 77
End: 2022-09-28

## 2022-09-28 ENCOUNTER — OUTPATIENT (OUTPATIENT)
Dept: OUTPATIENT SERVICES | Facility: HOSPITAL | Age: 77
LOS: 1 days | End: 2022-09-28
Payer: MEDICARE

## 2022-09-28 DIAGNOSIS — C34.91 MALIGNANT NEOPLASM OF UNSPECIFIED PART OF RIGHT BRONCHUS OR LUNG: ICD-10-CM

## 2022-09-28 DIAGNOSIS — Z90.722 ACQUIRED ABSENCE OF OVARIES, BILATERAL: Chronic | ICD-10-CM

## 2022-09-28 DIAGNOSIS — Z98.890 OTHER SPECIFIED POSTPROCEDURAL STATES: Chronic | ICD-10-CM

## 2022-09-28 PROCEDURE — 71250 CT THORAX DX C-: CPT

## 2022-09-28 PROCEDURE — 71250 CT THORAX DX C-: CPT | Mod: 26,MH

## 2022-10-11 NOTE — ASU PATIENT PROFILE, ADULT - ALCOHOL USE HISTORY SINGLE SELECT
Detail Level: Zone Small Metal Eye Shield Text: The ocular mucosa was anesthetized with tetracaine. Once adequate anesthesia was optained, small metal eye shields were inserted and remained in place until the procedure was completed. Wavelength: 1550nm Energy(Mj/Cm2): 1 Large Plastic Eye Shield Text: The ocular mucosa was anesthetized with tetracaine. Once adequate anesthesia was optained, large plastic eye shields were inserted and remained in place until the procedure was completed. Anesthesia Type: 1% lidocaine with epinephrine Indication: traumatic scar Medium Metal Eye Shield Text: The ocular mucosa was anesthetized with tetracaine. Once adequate anesthesia was optained, medium metal eye shields were inserted and remained in place until the procedure was completed. Energy(Mj/Cm2): 50 Treatment Level: 6 Large Metal Eye Shield Text: The ocular mucosa was anesthetized with tetracaine. Once adequate anesthesia was optained, large metal eye shields were inserted and remained in place until the procedure was completed. Number Of Passes: 8 Consent: Written consent obtained, risks reviewed including but not limited to pain and incomplete improvement. Small Plastic Eye Shield Text: The ocular mucosa was anesthetized with tetracaine. Once adequate anesthesia was optained, small plastic eye shields were inserted and remained in place until the procedure was completed. Add Post-Care Below To The Note: No External Cooling Fan Speed: 5 Medium Plastic Eye Shield Text: The ocular mucosa was anesthetized with tetracaine. Once adequate anesthesia was optained, medium plastic eye shields were inserted and remained in place until the procedure was completed. Post-Care Instructions: I reviewed with the patient in detail post-care instructions. Patient should avoid sun until area fully healed. never

## 2022-10-25 ENCOUNTER — NON-APPOINTMENT (OUTPATIENT)
Age: 77
End: 2022-10-25

## 2022-10-25 ENCOUNTER — APPOINTMENT (OUTPATIENT)
Dept: THORACIC SURGERY | Facility: CLINIC | Age: 77
End: 2022-10-25

## 2022-10-25 VITALS
DIASTOLIC BLOOD PRESSURE: 67 MMHG | HEART RATE: 77 BPM | BODY MASS INDEX: 24.25 KG/M2 | OXYGEN SATURATION: 99 % | SYSTOLIC BLOOD PRESSURE: 99 MMHG | WEIGHT: 120.05 LBS

## 2022-10-25 DIAGNOSIS — R91.1 SOLITARY PULMONARY NODULE: ICD-10-CM

## 2022-10-25 DIAGNOSIS — I10 ESSENTIAL (PRIMARY) HYPERTENSION: ICD-10-CM

## 2022-10-25 DIAGNOSIS — C34.90 MALIGNANT NEOPLASM OF UNSPECIFIED PART OF UNSPECIFIED BRONCHUS OR LUNG: ICD-10-CM

## 2022-10-25 DIAGNOSIS — C34.11 MALIGNANT NEOPLASM OF UPPER LOBE, RIGHT BRONCHUS OR LUNG: ICD-10-CM

## 2022-10-25 PROCEDURE — 99214 OFFICE O/P EST MOD 30 MIN: CPT

## 2022-10-25 NOTE — ASSESSMENT
[FreeTextEntry1] : Ms. HOSEA GRAY, 76 year old female, former smoker (3-4PPDx 15 years; Quit in 1980's), w/ hx of History of Johnson's esophagus; lung cancer, status post RUL resection and RLL superior segmentectomy 2/25/2014 at [facility] (Stage I lung cancer?), crest syndrome, Has been undergoing active surveillance. Most recent imaging with New, right lung nodules. Referred to office by Dr. Valdovinos for further evaluation. \par \par Now, s/p Flexible bronchoscopy, redo uniportal right video-assisted thoracic surgery,  pneumonolysis, wedge resection of right upper lobe x1 and right lower lobe x2, and mediastinal lymph node dissection. Path of RUL wedge resection revealed adenocarcinoma, acinar predominant, 0.8 x 0.8 x 0.5 cm, G2, pT1a N0. RLL wedge resection revealed nonnecrotizing granulomas and focal necrotizing. Additional Findings:  Emphysema. Metallic coil identified in part # 1 Special stains:  AFB stain is negative for acid fast bacilli. GMS stain is negative for fungal organisms.\par \par postoperative course was complicated by small air leak that eventually resolved. and she was discharged home on 6/21/22 with a 10-day course of antibiotic for suspected RLL PNA. Contracted Hives on Day 5 of Augmenting. Contacted service line and was advised to d/c.  \par \par I have reviewed the patient's medical records and diagnostic images at time of this office consultation and have made the following recommendation:\par 1. CT scan showed no evidence of recurrence, recommended patient to return to office in 3 months with CT chest w/o contrast.	\par \par I personally performed the services described in the documentation, reviewed the documentation recorded by the scribe in my presence and it accurately and completely records my words and actions.\par \par I, Griffin Quigley NP, am scribing for and the presence of BREANNE Ingram, the following sections HISTORY OF PRESENT ILLNESS, PAST MEDICAL/FAMILY/SOCIAL HISTORY; REVIEW OF SYSTEMS; VITAL SIGNS; PHYSICAL EXAM; DISPOSITION. \par

## 2022-10-25 NOTE — HISTORY OF PRESENT ILLNESS
[FreeTextEntry1] : Ms. HOSEA GRAY, 76 year old female, former smoker (3-4PPDx 15 years; Quit in 1980's), w/ hx of History of Johnson's esophagus; lung cancer, status post RUL resection and RLL superior segmentectomy 2/25/2014 at [facility] (Stage I lung cancer?), crest syndrome, Has been undergoing active surveillance. Most recent imaging with New, right lung nodules. Referred to office by Dr. Valdovinos for further evaluation. \par \par Of note: July, 2020: New finding in right lower lobe possible endobronchial lesion versus mucoid impaction. MRI did not demonstrate any conclusive findings.8/2020: s/p BAL. Path negative \par \par PFTs on 3/8/22: FVC: 1.78 (84%); FEV1: 1.25 (78%); DLCO: 10.42 (59%) \par \par CT Chest on 2/28/22: ([facility])\par - Stable post op changes within the right hemithorax\par - Evolving, irregular, noncalcified semisolid RUL nodule: 9mm (Series 11, Image 134). Solid component measures 5mm. Solid component not present on CT from 9/4/15 and now solid component is more prominent with compared to CT from 8/24/21. The overall size of the lesion has increased from 9/4/15. \par - Additional subcentimeter noncalcified solid and groundglass pulmonary nodules are redemonstrated and unchanged. \par - Tubular branching soft tissue is again demonstrate within the posterior RLL likely representing mucoid impaction, unchanged. \par - Stable, tubular branching soft tissue density is present within the periphery of the left lower lobe as well, unchanged, again, favor mucoid impaction. \par - Left adrenal myolipoma \par \par PET/CT on 3/11/22: ([facility])\par - Non - FDG avid 9 x 7 mm irregular nodule in RUL (image 33)\par - New 1.2 x 1 cm nodular opacity in the RLL at the postsurgical site (Series 4, image 26) SUV 1.3 \par - Tubular branching soft tissue in the RUL without hypermetabolic uptake, likely mucoid impaction; 4 mm Peripheral nodule in the LL (Image 48)\par - No evidence of FDG avid lymphadenopathy \par \par Patient was given a course of ABX on 04/05/2022 for 7 days. \par \par CT chest on 05/23/2022:\par - Along the superior aspect of the right lower lobe suture is a 1.2 x 1.0 cm solid nodule series 10 image 123 which is unchanged from prior PET/CT but new from 2/28/2022. This was FDG avid.\par - Clustered nodules and tubular presumed airway impaction inferior to this nodule in the right lower lobe is unchanged.\par - 9 x 7 mm part solid nodule in the right upper lobe on series 6 image 135 is unchanged since 2/28/2022 but increased in size and density since 2/2/2021.\par - Other small solid and some solid nodules are stable, annotated series 10. Airway impaction and centrilobular nodules in the basilar left lower lobe are unchanged, series 10 image 241.\par \par Now, s/p Flexible bronchoscopy, redo uniportal right video-assisted thoracic surgery, pneumonolysis, wedge resection of right upper lobe x1 and right lower lobe x2, and mediastinal lymph node dissection on 6/17/22. Path of RUL wedge resection revealed adenocarcinoma, acinar predominant, 0.8 x 0.8 x 0.5 cm, G2, pT1a N0. RLL wedge resection revealed nonnecrotizing granulomas and focal necrotizing. Additional Findings: Emphysema. Metallic coil identified in part # 1 Special stains: AFB stain is negative for acid fast bacilli. GMS stain is negative for fungal organisms.\par \par postoperative course was complicated by small air leak that eventually resolved. and she was discharged home on 6/21/22 with a 10-day course of antibiotic for suspected RLL PNA. Contracted Hives on Day 5 of Augmenting. Contacted service line and was advised to d/c.\par \par Patient was seen on 06/28/2022. CXR showed Moderate right pleural effusion, recommended to RTC in 1 week with repeat CT Chest to further evaluate as well as to discuss final path. \par \par CT chest on 06/28/2022: \par - Stable post op findings.\par \par Patient was evaluated by Dr. Kirkland on 07/13/2022, there is no role for systemic adjuvant tx. \par \par CT chest on 09/28/2022: \par - Status post right upper and lower lobe wedge resections. Decreased, now trace right pleural effusion with resolved pleural air component.\par - Stable 1.8 cm hypodense tubular right lower lobe nodule, possibly chronic bronchial impaction.\par \par Patient is here today for a follow up. Patient denies any shortness of breath, chest pain, fever, or chills. She does endorses experiencing occasional dry cough.\par

## 2022-11-01 ENCOUNTER — APPOINTMENT (OUTPATIENT)
Dept: PULMONOLOGY | Facility: CLINIC | Age: 77
End: 2022-11-01

## 2022-11-01 VITALS
DIASTOLIC BLOOD PRESSURE: 53 MMHG | SYSTOLIC BLOOD PRESSURE: 94 MMHG | HEIGHT: 59 IN | OXYGEN SATURATION: 96 % | HEART RATE: 80 BPM | WEIGHT: 120 LBS | BODY MASS INDEX: 24.19 KG/M2

## 2022-11-01 PROCEDURE — 99214 OFFICE O/P EST MOD 30 MIN: CPT | Mod: 25

## 2022-11-01 PROCEDURE — 94010 BREATHING CAPACITY TEST: CPT

## 2022-11-03 NOTE — PROCEDURE
[FreeTextEntry1] : Spirometry demonstrates mild airway obstruction with interval decline since surgery

## 2022-11-03 NOTE — HISTORY OF PRESENT ILLNESS
[Former] : former [Never] : never [TextBox_4] : Follow-up for lung cancer status post right upper lobe resection and right lower lobe superior segmentectomy 2014.  History of asthma.  Abnormal chest CT noted this year\par \par Status post flexible bronchoscopy, redo uniportal right video-assisted thoracic surgery, pneumonolysis, wedge resection of right upper lobe x1 and right lower lobe x2, and mediastinal lymph node dissection on 6/17/22. Path of RUL wedge resection revealed adenocarcinoma, acinar predominant, 0.8 x 0.8 x 0.5 cm, G2, pT1a N0. RLL wedge resection revealed nonnecrotizing granulomas and focal necrotizing. Additional Findings: Emphysema. Metallic coil identified in part # 1 Special stains: AFB stain is negative for acid fast bacilli. GMS stain is negative for fungal organisms.\par Seen by oncology no additional treatment recommended\par Feels well occasional shortness of breath.

## 2022-11-07 NOTE — PRE-ANESTHESIA EVALUATION ADULT - BP NONINVASIVE DIASTOLIC (MM HG)
Received an incoming call from patient due to test results. Requested an appointment with provider for reviewing CT scan results.    Received a return call from patient informed her that Dr. Guerrero has not reviewed the report, but I did send him a message and once I get orders I will call her back.    Call placed to patient due to message left, currently not available message left on machine.    ----- Message from Nargis Sanchez sent at 11/7/2022  1:25 PM CST -----  Contact: PT  Type:  Test Results    Who Called:  PT  Name of Test (Lab/Mammo/Etc):  CT Abdomen Without Contrast  Date of Test:  10/26/22  Ordering Provider:  Dr. Guerrero  Where the test was performed:  [facility]  Best Call Back Number:  [phone]  Additional Information:  PT asking for a call back to discuss results     
[phone]

## 2022-12-19 NOTE — DISCHARGE NOTE NURSING/CASE MANAGEMENT/SOCIAL WORK - NSDCVIVACCINE_GEN_ALL_CORE_FT
normal...
Tdap; 10-Dec-2017 05:34; Rene Reynoso (SUSSY); [employer]; [id number]; IntraMuscular; Deltoid Left.; 0.5 milliLiter(s); VIS (VIS Published: 09-May-2013, VIS Presented: 10-Dec-2017);

## 2023-01-24 ENCOUNTER — OUTPATIENT (OUTPATIENT)
Dept: OUTPATIENT SERVICES | Facility: HOSPITAL | Age: 78
LOS: 1 days | End: 2023-01-24
Payer: MEDICARE

## 2023-01-24 ENCOUNTER — APPOINTMENT (OUTPATIENT)
Dept: CT IMAGING | Facility: IMAGING CENTER | Age: 78
End: 2023-01-24
Payer: MEDICARE

## 2023-01-24 DIAGNOSIS — Z90.722 ACQUIRED ABSENCE OF OVARIES, BILATERAL: Chronic | ICD-10-CM

## 2023-01-24 DIAGNOSIS — C34.11 MALIGNANT NEOPLASM OF UPPER LOBE, RIGHT BRONCHUS OR LUNG: ICD-10-CM

## 2023-01-24 DIAGNOSIS — Z98.890 OTHER SPECIFIED POSTPROCEDURAL STATES: Chronic | ICD-10-CM

## 2023-01-24 PROCEDURE — 71250 CT THORAX DX C-: CPT | Mod: 26,MH

## 2023-01-24 PROCEDURE — 71250 CT THORAX DX C-: CPT

## 2023-01-30 ENCOUNTER — TRANSCRIPTION ENCOUNTER (OUTPATIENT)
Age: 78
End: 2023-01-30

## 2023-01-31 ENCOUNTER — NON-APPOINTMENT (OUTPATIENT)
Age: 78
End: 2023-01-31

## 2023-01-31 ENCOUNTER — TRANSCRIPTION ENCOUNTER (OUTPATIENT)
Age: 78
End: 2023-01-31

## 2023-01-31 ENCOUNTER — APPOINTMENT (OUTPATIENT)
Dept: THORACIC SURGERY | Facility: CLINIC | Age: 78
End: 2023-01-31
Payer: MEDICARE

## 2023-01-31 VITALS
TEMPERATURE: 98 F | SYSTOLIC BLOOD PRESSURE: 107 MMHG | OXYGEN SATURATION: 99 % | DIASTOLIC BLOOD PRESSURE: 60 MMHG | RESPIRATION RATE: 18 BRPM | HEART RATE: 80 BPM

## 2023-01-31 PROCEDURE — 99213 OFFICE O/P EST LOW 20 MIN: CPT

## 2023-01-31 NOTE — HISTORY OF PRESENT ILLNESS
[FreeTextEntry1] : Ms. HOSEA GRAY, 77 year old female, former smoker (3-4PPDx 15 years; Quit in 1980's), w/ hx of History of Johnson's esophagus; lung cancer, status post RUL resection and RLL superior segmentectomy 2/25/2014 at [facility] (Stage I lung cancer?), crest syndrome, Has been undergoing active surveillance. Most recent imaging with New, right lung nodules. Referred to office by Dr. Valdovinos for further evaluation. \par \par Of note: July, 2020: New finding in right lower lobe possible endobronchial lesion versus mucoid impaction. MRI did not demonstrate any conclusive findings.8/2020: s/p BAL. Path negative \par \par PFTs on 3/8/22: FVC: 1.78 (84%); FEV1: 1.25 (78%); DLCO: 10.42 (59%) \par \par CT Chest on 2/28/22: ([facility])\par - Stable post op changes within the right hemithorax\par - Evolving, irregular, noncalcified semisolid RUL nodule: 9mm (Series 11, Image 134). Solid component measures 5mm. Solid component not present on CT from 9/4/15 and now solid component is more prominent with compared to CT from 8/24/21. The overall size of the lesion has increased from 9/4/15. \par - Additional subcentimeter noncalcified solid and groundglass pulmonary nodules are redemonstrated and unchanged. \par - Tubular branching soft tissue is again demonstrate within the posterior RLL likely representing mucoid impaction, unchanged. \par - Stable, tubular branching soft tissue density is present within the periphery of the left lower lobe as well, unchanged, again, favor mucoid impaction. \par - Left adrenal myolipoma \par \par PET/CT on 3/11/22: ([facility])\par - Non - FDG avid 9 x 7 mm irregular nodule in RUL (image 33)\par - New 1.2 x 1 cm nodular opacity in the RLL at the postsurgical site (Series 4, image 26) SUV 1.3 \par - Tubular branching soft tissue in the RUL without hypermetabolic uptake, likely mucoid impaction; 4 mm Peripheral nodule in the LL (Image 48)\par - No evidence of FDG avid lymphadenopathy \par \par Patient was given a course of ABX on 04/05/2022 for 7 days. \par \par CT chest on 05/23/2022:\par - Along the superior aspect of the right lower lobe suture is a 1.2 x 1.0 cm solid nodule series 10 image 123 which is unchanged from prior PET/CT but new from 2/28/2022. This was FDG avid.\par - Clustered nodules and tubular presumed airway impaction inferior to this nodule in the right lower lobe is unchanged.\par - 9 x 7 mm part solid nodule in the right upper lobe on series 6 image 135 is unchanged since 2/28/2022 but increased in size and density since 2/2/2021.\par - Other small solid and some solid nodules are stable, annotated series 10. Airway impaction and centrilobular nodules in the basilar left lower lobe are unchanged, series 10 image 241.\par \par Now, s/p Flexible bronchoscopy, redo uniportal right video-assisted thoracic surgery, pneumonolysis, wedge resection of right upper lobe x1 and right lower lobe x2, and mediastinal lymph node dissection on 6/17/22. Path of RUL wedge resection revealed adenocarcinoma, acinar predominant, 0.8 x 0.8 x 0.5 cm, G2, pT1a N0. RLL wedge resection revealed nonnecrotizing granulomas and focal necrotizing. Additional Findings: Emphysema. Metallic coil identified in part # 1 Special stains: AFB stain is negative for acid fast bacilli. GMS stain is negative for fungal organisms.\par \par postoperative course was complicated by small air leak that eventually resolved. and she was discharged home on 6/21/22 with a 10-day course of antibiotic for suspected RLL PNA. Contracted Hives on Day 5 of Augmenting. Contacted service line and was advised to d/c.\par \par Patient was seen on 06/28/2022. CXR showed Moderate right pleural effusion, recommended to RTC in 1 [address] with repeat [address] Chest to further evaluate as well as to discuss final path. \par \par CT chest on 06/28/2022: \par - Stable post op findings.\par \par Patient was evaluated by Dr. Kirkland on 07/13/2022, there is no role for systemic adjuvant tx. \par \par CT chest on 09/28/2022: \par - Status post right upper and lower lobe wedge resections. Decreased, now trace right pleural effusion with resolved pleural air component.\par - Stable 1.8 cm hypodense tubular right lower lobe nodule, possibly chronic bronchial impaction.\par \par CT chest on 1/24/23: \par - Post op changes\par - 1.8 cm nodular opacity in RLL; Unchanged in its size and appearance when compared to previous exam. \par - Few cluster of tiny nodules in the LLL likely representing mucoid impacted distal small airways are also unchanged. \par - Below the diaphragm, visualized portions of the abdomen demonstrate left adrenal gland myelolipoma.\par \par Patient is here today for a follow up. Today, patient denies worsening SOB, chest pain, cough, hemoptysis, fever, chills, night sweats, lightheadedness or dizziness.\par

## 2023-01-31 NOTE — ASSESSMENT
[FreeTextEntry1] : Ms. HOSEA GRAY, 77 year old female, former smoker (3-4PPDx 15 years; Quit in 1980's), w/ hx of History of Johnson's esophagus; lung cancer, status post RUL resection and RLL superior segmentectomy 2/25/[address] [facility] (Stage I lung cancer?), crest syndrome. \par \par Now, s/p Flexible bronchoscopy, redo uniportal right video-assisted thoracic surgery, pneumonolysis, wedge resection of right upper lobe x1 and right lower lobe x2, and mediastinal lymph node dissection on 6/17/22. Path of RUL wedge resection revealed adenocarcinoma, acinar predominant, 0.8 x 0.8 x 0.5 cm, G2, pT1a N0. RLL wedge resection revealed nonnecrotizing granulomas and focal necrotizing. Additional Findings: Emphysema. Metallic coil identified in part # 1 Special stains: AFB stain is negative for acid fast bacilli. GMS stain is negative for fungal organisms.\par \par postoperative course was complicated by small air leak that eventually resolved. and she was discharged home on 6/21/22 with a 10-day course of antibiotic for suspected RLL PNA. Contracted Hives on Day 5 of Augmenting. Contacted service line and was advised to d/c.\par \par Patient was evaluated by Dr. Kirkland on 07/13/2022, there is no role for systemic adjuvant tx. \par \par CT chest on 1/24/23: \par - Post op changes\par - 1.8 cm nodular opacity in RLL; Unchanged in its size and appearance when compared to previous exam. \par - Few cluster of tiny nodules in the LLL likely representing mucoid impacted distal small airways are also unchanged. \par - Below the diaphragm, visualized portions of the abdomen demonstrate left adrenal gland myelolipoma.\par \par Here today with follow up scan. Overall, feeling well. \par \par I have independently reviewed the medical records and imaging report at the time of this office consultation, and discussed the following interpretations with the patient:\par - Reported Stable 1.8 cm nodular opacity in the right lower lobe when compared to previous exam. Discussed follow up telehealth visit next week for Dr. Bravo to independently review and provide recommendations. She is agreeable. \par - Additionally, Radiologists reporting a left adrenal gland myelolipoma. Recommended to follow up with PCP. Copy of report given to patient. \par \par Recommendations reviewed with patient during this office visit, and all questions answered; Patient instructed on the importance of follow up and verbalizes understanding.\par \par \par \par \par \par \par \par \par \par

## 2023-02-03 NOTE — H&P PST ADULT - VENOUS THROMBOEMBOLISM HISTORY
Is This A New Presentation, Or A Follow-Up?: Skin Lesion
What Type Of Note Output Would You Prefer (Optional)?: Standard Output
Has Your Skin Lesion Been Treated?: not been treated
Additional History: Patient has a lesion that has gotten inflammed and drained and he would like it removed
(0) indicator not present

## 2023-02-07 ENCOUNTER — APPOINTMENT (OUTPATIENT)
Dept: THORACIC SURGERY | Facility: CLINIC | Age: 78
End: 2023-02-07
Payer: MEDICARE

## 2023-02-07 PROCEDURE — 99214 OFFICE O/P EST MOD 30 MIN: CPT | Mod: 95

## 2023-02-07 NOTE — HISTORY OF PRESENT ILLNESS
[FreeTextEntry1] : \par Ms. HOSEA GRAY, 77 year old female, former smoker (3-4PPDx 15 years; Quit in 1980's), w/ hx of History of Johnson's esophagus; lung cancer, status post RUL resection and RLL superior segmentectomy 2/25/2014 at [facility] (Stage I lung cancer?), crest syndrome, Has been undergoing active surveillance. Most recent imaging with New, right lung nodules. Referred to office by Dr. Valdovinos for further evaluation. \par \par Of note: July, 2020: New finding in right lower lobe possible endobronchial lesion versus mucoid impaction. MRI did not demonstrate any conclusive findings.8/2020: s/p BAL. Path negative \par \par PFTs on 3/8/22: FVC: 1.78 (84%); FEV1: 1.25 (78%); DLCO: 10.42 (59%) \par \par CT Chest on 2/28/22: ([facility])\par - Stable post op changes within the right hemithorax\par - Evolving, irregular, noncalcified semisolid RUL nodule: 9mm (Series 11, Image 134). Solid component measures 5mm. Solid component not present on CT from 9/4/15 and now solid component is more prominent with compared to CT from 8/24/21. The overall size of the lesion has increased from 9/4/15. \par - Additional subcentimeter noncalcified solid and groundglass pulmonary nodules are redemonstrated and unchanged. \par - Tubular branching soft tissue is again demonstrate within the posterior RLL likely representing mucoid impaction, unchanged. \par - Stable, tubular branching soft tissue density is present within the periphery of the left lower lobe as well, unchanged, again, favor mucoid impaction. \par - Left adrenal myolipoma \par \par PET/CT on 3/11/22: ([facility])\par - Non - FDG avid 9 x 7 mm irregular nodule in RUL (image 33)\par - New 1.2 x 1 cm nodular opacity in the RLL at the postsurgical site (Series 4, image 26) SUV 1.3 \par - Tubular branching soft tissue in the RUL without hypermetabolic uptake, likely mucoid impaction; 4 mm Peripheral nodule in the LL (Image 48)\par - No evidence of FDG avid lymphadenopathy \par \par Patient was given a course of ABX on 04/05/2022 for 7 days. \par \par CT chest on 05/23/2022:\par - Along the superior aspect of the right lower lobe suture is a 1.2 x 1.0 cm solid nodule series 10 image 123 which is unchanged from prior PET/CT but new from 2/28/2022. This was FDG avid.\par - Clustered nodules and tubular presumed airway impaction inferior to this nodule in the right lower lobe is unchanged.\par - 9 x 7 mm part solid nodule in the right upper lobe on series 6 image 135 is unchanged since 2/28/2022 but increased in size and density since 2/2/2021.\par - Other small solid and some solid nodules are stable, annotated series 10. Airway impaction and centrilobular nodules in the basilar left lower lobe are unchanged, series 10 image 241.\par \par Now, s/p Flexible bronchoscopy, redo uniportal right video-assisted thoracic surgery, pneumonolysis, wedge resection of right upper lobe x1 and right lower lobe x2, and mediastinal lymph node dissection on 6/17/22. Path of RUL wedge resection revealed adenocarcinoma, acinar predominant, 0.8 x 0.8 x 0.5 cm, G2, pT1a N0. RLL wedge resection revealed nonnecrotizing granulomas and focal necrotizing. Additional Findings: Emphysema. Metallic coil identified in part # 1 Special stains: AFB stain is negative for acid fast bacilli. GMS stain is negative for fungal organisms.\par \par postoperative course was complicated by small air leak that eventually resolved. and she was discharged home on 6/21/22 with a 10-day course of antibiotic for suspected RLL PNA. Contracted Hives on Day 5 of Augmenting. Contacted service line and was advised to d/c.\par \par Patient was seen on 06/28/2022. CXR showed Moderate right pleural effusion, recommended to RTC in 1 week with repeat CT Chest to further evaluate as well as to discuss final path. \par \par CT chest on 06/28/2022: \par - Stable post op findings.\par \par Patient was evaluated by Dr. Kirkland on 07/13/2022, there is no role for systemic adjuvant tx. \par \par CT chest on 09/28/2022: \par - Status post right upper and lower lobe wedge resections. Decreased, now trace right pleural effusion with resolved pleural air component.\par - Stable 1.8 cm hypodense tubular right lower lobe nodule, possibly chronic bronchial impaction.\par \par CT chest on 1/24/23: \par - Post op changes\par - 1.8 cm nodular opacity in RLL; Unchanged in its size and appearance when compared to previous exam. \par - Few cluster of tiny nodules in the LLL likely representing mucoid impacted distal small airways are also unchanged. \par - Below the diaphragm, visualized portions of the abdomen demonstrate left adrenal gland myelolipoma.\par \par Patient is here today for a follow up via telehealth. Patient denies shortness of breath, cough, chest pain, fever, chills.

## 2023-02-07 NOTE — DATA REVIEWED
[FreeTextEntry1] : Independently reviewed the following:\par - CT chest on 09/28/2022: \par - CT chest on 1/24/23:

## 2023-02-07 NOTE — ASSESSMENT
[FreeTextEntry1] : Ms. HOSEA GRAY, 77 year old female, former smoker (3-4PPDx 15 years; Quit in 1980's), w/ hx of History of Johnson's esophagus; lung cancer, status post RUL resection and RLL superior segmentectomy 2/25/2014 at [facility] (Stage I lung cancer?), crest syndrome. \par \par Now, s/p Flexible bronchoscopy, redo uniportal right video-assisted thoracic surgery, pneumonolysis, wedge resection of right upper lobe x1 and right lower lobe x2, and mediastinal lymph node dissection on 6/17/22. Path of RUL wedge resection revealed adenocarcinoma, acinar predominant, 0.8 x 0.8 x 0.5 cm, G2, pT1a N0. RLL wedge resection revealed nonnecrotizing granulomas and focal necrotizing. Additional Findings: Emphysema. Metallic coil identified in part # 1 Special stains: AFB stain is negative for acid fast bacilli. GMS stain is negative for fungal organisms.\par \par postoperative course was complicated by small air leak that eventually resolved. and she was discharged home on 6/21/22 with a 10-day course of antibiotic for suspected RLL PNA. Contracted Hives on Day 5 of Augmenting. Contacted service line and was advised to d/c.\par \par Patient was evaluated by Dr. Kirkland on 07/13/2022, there is no role for systemic adjuvant tx. \par \par CT chest on 1/24/23: \par - Post op changes\par - 1.8 cm nodular opacity in RLL; Unchanged in its size and appearance when compared to previous exam. \par - Few cluster of tiny nodules in the LLL likely representing mucoid impacted distal small airways are also unchanged. \par - Below the diaphragm, visualized portions of the abdomen demonstrate left adrenal gland myelolipoma.\par \par Here today with follow up scan via telehealth \par \par I have independently reviewed the medical records and imaging report at the time of this office consultation, and discussed the following interpretations with the patient:\par 1. CT chest reviewed and explained to patient, stable lung nodule, I recommended patient to return to office in 3 months with CT Chest without contrast. \par \par I, Dr. [facility], personally performed the evaluation and management (E/M) services for this established patient who follow up today with an existing condition.  That E/M includes assessing all new/exacerbated/existing conditions, and establishing a plan of care.  Today, my ACP, Rissa Patel ANP-C, was here to observe my evaluation and management services for this existing condition to be followed going forward.

## 2023-02-07 NOTE — REASON FOR VISIT
[Home] : at home, [unfilled] , at the time of the visit. [Medical Office: ([facility])___] : at the medical office located in  [Patient] : the patient [Self] : self [Follow-Up: _____] : a [unfilled] follow-up visit [This encounter was initiated by telehealth (audio with video) and converted to telephone (audio only) due to technical difficulties.] : This encounter was initiated by telehealth (audio with video) and converted to telephone (audio only) due to technical difficulties.

## 2023-02-13 ENCOUNTER — APPOINTMENT (OUTPATIENT)
Dept: PULMONOLOGY | Facility: CLINIC | Age: 78
End: 2023-02-13
Payer: MEDICARE

## 2023-02-13 VITALS — HEART RATE: 80 BPM | DIASTOLIC BLOOD PRESSURE: 50 MMHG | SYSTOLIC BLOOD PRESSURE: 98 MMHG | OXYGEN SATURATION: 97 %

## 2023-02-13 PROCEDURE — 94010 BREATHING CAPACITY TEST: CPT

## 2023-02-13 PROCEDURE — 99213 OFFICE O/P EST LOW 20 MIN: CPT | Mod: 25

## 2023-02-13 RX ORDER — CEFUROXIME AXETIL 500 MG/1
500 TABLET ORAL
Qty: 14 | Refills: 0 | Status: DISCONTINUED | COMMUNITY
Start: 2022-04-05 | End: 2023-02-13

## 2023-02-13 RX ORDER — AMLODIPINE BESYLATE 10 MG/1
10 TABLET ORAL
Refills: 0 | Status: ACTIVE | COMMUNITY
Start: 2023-02-13

## 2023-02-13 RX ORDER — AMLODIPINE BESYLATE AND BENAZEPRIL HYDROCHLORIDE 5; 10 MG/1; MG/1
5-10 CAPSULE ORAL
Qty: 90 | Refills: 0 | Status: DISCONTINUED | COMMUNITY
Start: 2020-06-29 | End: 2023-02-13

## 2023-02-13 NOTE — REASON FOR VISIT
[Follow-Up] : a follow-up visit [Abnormal CXR/ Chest CT] : an abnormal CXR/ chest CT [Asthma] : asthma [TextBox_44] : Lung cancer

## 2023-02-13 NOTE — ASSESSMENT
[FreeTextEntry1] : Ms. HOSEA GRAY is an 77 year old female, history of asthma, lung cancer. Lung function seems to be improving based on Spirometry results today.\par \par Chest CT stable

## 2023-02-13 NOTE — HISTORY OF PRESENT ILLNESS
[Never] : never [TextBox_4] : HOSEA GRAY is a 77 year old female, with history of asthma, lung cancer, who presents to the office for follow up evaluation of an abnormal chest CT scan. Patient reports that she is feeling well overall with no respiratory complaints.

## 2023-02-13 NOTE — ADDENDUM
[FreeTextEntry1] : I, Codie Gukevin, acted solely as a scribe for Dr. Jean Valdovinos M.D. on this date 02/13/2023. \par \par All medical record entries made by the Scribe were at my, Dr. Jean Valdovinos M.D., direction and personally dictated by me on 02/13/2023. I have reviewed the chart and agree that the record accurately reflects my personal performance of the history, physical exam, assessment and plan. I have also personally directed, reviewed, and agreed with the chart.

## 2023-02-16 ENCOUNTER — APPOINTMENT (OUTPATIENT)
Dept: PULMONOLOGY | Facility: CLINIC | Age: 78
End: 2023-02-16
Payer: MEDICARE

## 2023-02-16 VITALS
SYSTOLIC BLOOD PRESSURE: 109 MMHG | HEIGHT: 58 IN | WEIGHT: 121 LBS | HEART RATE: 74 BPM | RESPIRATION RATE: 16 BRPM | DIASTOLIC BLOOD PRESSURE: 68 MMHG | OXYGEN SATURATION: 97 % | BODY MASS INDEX: 25.4 KG/M2

## 2023-02-16 LAB — POCT - HEMOGLOBIN (HGB), QUANTITATIVE, TRANSCUTANEOUS: 12.9

## 2023-02-16 PROCEDURE — 88738 HGB QUANT TRANSCUTANEOUS: CPT

## 2023-02-16 PROCEDURE — 94727 GAS DIL/WSHOT DETER LNG VOL: CPT

## 2023-02-16 PROCEDURE — 94729 DIFFUSING CAPACITY: CPT

## 2023-02-16 PROCEDURE — 94010 BREATHING CAPACITY TEST: CPT

## 2023-03-01 ENCOUNTER — NON-APPOINTMENT (OUTPATIENT)
Age: 78
End: 2023-03-01

## 2023-03-07 NOTE — ASSESSMENT
[FreeTextEntry1] : Status post redo surgery now with resection of lung cancer which appears to be a new primary rather than a relapse.  No residual disease at this point.  Active follow-up by thoracic surgery and oncology.\par \par Lung function has declined since surgery recommend inhalers to maximize lung capacity Medication reconciliation completed.  Patient was unable to provide medication information, spoke to pt's HCP Johnnie Johnson at bedside and they provided current medication list; confirmed with Dr. [facility].

## 2023-03-09 ENCOUNTER — APPOINTMENT (OUTPATIENT)
Dept: PULMONOLOGY | Facility: CLINIC | Age: 78
End: 2023-03-09
Payer: MEDICARE

## 2023-03-09 PROCEDURE — 94618 PULMONARY STRESS TESTING: CPT

## 2023-04-25 ENCOUNTER — OUTPATIENT (OUTPATIENT)
Dept: OUTPATIENT SERVICES | Facility: HOSPITAL | Age: 78
LOS: 1 days | End: 2023-04-25
Payer: MEDICARE

## 2023-04-25 ENCOUNTER — APPOINTMENT (OUTPATIENT)
Dept: CT IMAGING | Facility: IMAGING CENTER | Age: 78
End: 2023-04-25
Payer: MEDICARE

## 2023-04-25 DIAGNOSIS — Z90.722 ACQUIRED ABSENCE OF OVARIES, BILATERAL: Chronic | ICD-10-CM

## 2023-04-25 DIAGNOSIS — C34.91 MALIGNANT NEOPLASM OF UNSPECIFIED PART OF RIGHT BRONCHUS OR LUNG: ICD-10-CM

## 2023-04-25 DIAGNOSIS — Z98.890 OTHER SPECIFIED POSTPROCEDURAL STATES: Chronic | ICD-10-CM

## 2023-04-25 PROCEDURE — 71250 CT THORAX DX C-: CPT | Mod: 26,MH

## 2023-04-25 PROCEDURE — 71250 CT THORAX DX C-: CPT

## 2023-05-09 ENCOUNTER — NON-APPOINTMENT (OUTPATIENT)
Age: 78
End: 2023-05-09

## 2023-05-09 ENCOUNTER — APPOINTMENT (OUTPATIENT)
Dept: THORACIC SURGERY | Facility: CLINIC | Age: 78
End: 2023-05-09
Payer: MEDICARE

## 2023-05-09 VITALS
RESPIRATION RATE: 16 BRPM | BODY MASS INDEX: 25.61 KG/M2 | HEIGHT: 58 IN | OXYGEN SATURATION: 98 % | DIASTOLIC BLOOD PRESSURE: 63 MMHG | SYSTOLIC BLOOD PRESSURE: 99 MMHG | WEIGHT: 122 LBS | HEART RATE: 64 BPM

## 2023-05-09 PROCEDURE — 99214 OFFICE O/P EST MOD 30 MIN: CPT

## 2023-05-09 NOTE — HISTORY OF PRESENT ILLNESS
[FreeTextEntry1] : Ms. HOSEA GRAY, 77 year old female, former smoker (3-4PPDx 15 years; Quit in 1980's), w/ hx of History of Johnson's esophagus; lung cancer, status post RUL resection and RLL superior segmentectomy 2/25/2014 at [facility] (Stage I lung cancer?), crest syndrome, Has been undergoing active surveillance. Referred in March, 2022 for RUL nodule\par \par PFTs on 3/8/22: FVC: 1.78 (84%); FEV1: 1.25 (78%); DLCO: 10.42 (59%) \par \par CT Chest on 2/28/22: ([facility])\par - Stable post op changes within the right hemithorax\par - Evolving, irregular, noncalcified semisolid RUL nodule: 9mm (Series 11, Image 134). Solid component measures 5mm. Solid component not present on CT from 9/4/15 and now solid component is more prominent with compared to CT from 8/24/21. The overall size of the lesion has increased from 9/4/15. \par - Additional subcentimeter noncalcified solid and groundglass pulmonary nodules are redemonstrated and unchanged. \par - Tubular branching soft tissue is again demonstrate within the posterior RLL likely representing mucoid impaction, unchanged. \par - Stable, tubular branching soft tissue density is present within the periphery of the left lower lobe as well, unchanged, again, favor mucoid impaction. \par - Left adrenal myolipoma \par \par PET/CT on 3/11/22: ([facility])\par - Non - FDG avid 9 x 7 mm irregular nodule in RUL (image 33)\par - New 1.2 x 1 cm nodular opacity in the RLL at the postsurgical site (Series 4, image 26) SUV 1.3 \par - Tubular branching soft tissue in the RUL without hypermetabolic uptake, likely mucoid impaction; 4 mm Peripheral nodule in the LL (Image 48)\par - No evidence of FDG avid lymphadenopathy \par \par Patient was given a course of ABX on 04/05/2022 for 7 days. \par \par CT chest on 05/23/2022:\par - Along the superior aspect of the right lower lobe suture is a 1.2 x 1.0 cm solid nodule series 10 image 123 which is unchanged from prior PET/CT but new from 2/28/2022. This was FDG avid.\par - Clustered nodules and tubular presumed airway impaction inferior to this nodule in the right lower lobe is unchanged.\par - 9 x 7 mm part solid nodule in the right upper lobe on series 6 image 135 is unchanged since 2/28/2022 but increased in size and density since 2/2/2021.\par - Other small solid and some solid nodules are stable, annotated series 10. Airway impaction and centrilobular nodules in the basilar left lower lobe are unchanged, series 10 image 241.\par \par Now, s/p Flexible bronchoscopy, redo uniportal right video-assisted thoracic surgery, pneumonolysis, wedge resection of right upper lobe x1 and right lower lobe x2, and mediastinal lymph node dissection on 6/17/22. Path of RUL wedge resection revealed adenocarcinoma, acinar predominant, 0.8 x 0.8 x 0.5 cm, G2, pT1a N0. RLL wedge resection revealed nonnecrotizing granulomas and focal necrotizing. Additional Findings: Emphysema. Metallic coil identified in part # 1 Special stains: AFB stain is negative for acid fast bacilli. GMS stain is negative for fungal organisms.\par \par postoperative course was complicated by small air leak that eventually resolved. and she was discharged home on 6/21/22 with a 10-day course of antibiotic for suspected RLL PNA. Contracted Hives on Day 5 of Augmenting. Contacted service line and was advised to d/c.\par \par Patient was seen on 06/28/2022. CXR showed Moderate right pleural effusion, recommended to RTC in 1 week with repeat CT Chest to further evaluate as well as to discuss final path. \par \par CT chest on 06/28/2022: \par - Stable post op findings.\par \par Patient was evaluated by Dr. Kirkland on 07/13/2022, there is no role for systemic adjuvant tx. \par \par CT chest on 09/28/2022: \par - Status post right upper and lower lobe wedge resections. Decreased, now trace right pleural effusion with resolved pleural air component.\par - Stable 1.8 cm hypodense tubular right lower lobe nodule, possibly chronic bronchial impaction.\par \par CT chest on 1/24/23: \par - Post op changes\par - 1.8 cm nodular opacity in RLL; Unchanged in its size and appearance when compared to previous exam. \par - Few cluster of tiny nodules in the LLL likely representing mucoid impacted distal small airways are also unchanged. \par - Below the diaphragm, visualized portions of the abdomen demonstrate left adrenal gland myelolipoma.\par \par CT Chest on 4/25/23: \par - Stable postoperative changes following right upper and lower lobe wedge resections. \par - Unchanged 1.8 cm nodular opacity in the RLL (series 2, image 46). Stable clustered tree-in-bud nodularity in the posterior LLL \par \par Patient is here today for a follow up. Today, patient denies worsening SOB, chest pain, cough, hemoptysis, fever, chills, night sweats, lightheadedness or dizziness.\par

## 2023-05-09 NOTE — PHYSICAL EXAM
[Respiration, Rhythm And Depth] : normal respiratory rhythm and effort [Exaggerated Use Of Accessory Muscles For Inspiration] : no accessory muscle use [Auscultation Breath Sounds / Voice Sounds] : lungs were clear to auscultation bilaterally [Heart Rate And Rhythm] : heart rate was normal and rhythm regular [Examination Of The Chest] : the chest was normal in appearance [Chest Visual Inspection Thoracic Asymmetry] : no chest asymmetry [Diminished Respiratory Excursion] : normal chest expansion [2+] : right 2+ [Involuntary Movements] : no involuntary movements were seen [Skin Color & Pigmentation] : normal skin color and pigmentation [Skin Turgor] : normal skin turgor [] : no rash [No Focal Deficits] : no focal deficits [Oriented To Time, Place, And Person] : oriented to person, place, and time [Cervical Lymph Nodes Enlarged Posterior Bilaterally] : posterior cervical [Cervical Lymph Nodes Enlarged Anterior Bilaterally] : anterior cervical [Supraclavicular Lymph Nodes Enlarged Bilaterally] : supraclavicular

## 2023-05-09 NOTE — ASSESSMENT
[FreeTextEntry1] : Ms. HOSEA GRAY, 77 year old female, former smoker (3-4PPDx 15 years; Quit in 1980's), w/ hx of History of Johnson's esophagus; lung cancer, status post RUL resection and RLL superior segmentectomy 2/25/2014 at [facility] (Stage I lung cancer?), crest syndrome, Has been undergoing active surveillance. Referred in March, 2022 for RUL nodule\par \par Now, s/p Flexible bronchoscopy, redo uniportal right video-assisted thoracic surgery, pneumonolysis, wedge resection of right upper lobe x1 and right lower lobe x2, and mediastinal lymph node dissection on 6/17/22. Path of RUL wedge resection revealed adenocarcinoma, acinar predominant, 0.8 x 0.8 x 0.5 cm, G2, pT1a N0. RLL wedge resection revealed nonnecrotizing granulomas and focal necrotizing. Additional Findings: Emphysema. Metallic coil identified in part # 1 Special stains: AFB stain is negative for acid fast bacilli. GMS stain is negative for fungal organisms.\par \par Patient was evaluated by Dr. Kirkland on 07/13/2022, there is no role for systemic adjuvant tx. \par \par Here today with repeat imaging. \par \par I have independently reviewed the medical records and imaging at the time of this office consultation, and discussed the following interpretations with the patient:\par - CT chest with stable, post op findings. Discussed returning to clinic in 3 months with repeat CT Chest, no contrast, to re-evaluate stability. She is agreeable. \par \par Recommendations reviewed with patient during this office visit, and all questions answered; Patient instructed on the importance of follow up and verbalizes understanding.\par \par I, BREANNE Ingram, personally performed the evaluation and management (E/M) services for this established patient. That E/M includes conducting the examination, assessing all new/exacerbated conditions, and establishing a new plan of care. Today, My ACP, Deisi Munguia, was here to observe my evaluation and management services for this patient to be followed going forward.\par \par \par \par \par

## 2023-06-09 ENCOUNTER — APPOINTMENT (OUTPATIENT)
Dept: OTOLARYNGOLOGY | Facility: CLINIC | Age: 78
End: 2023-06-09
Payer: MEDICARE

## 2023-06-09 VITALS
DIASTOLIC BLOOD PRESSURE: 65 MMHG | SYSTOLIC BLOOD PRESSURE: 139 MMHG | WEIGHT: 122 LBS | BODY MASS INDEX: 25.61 KG/M2 | HEART RATE: 80 BPM | TEMPERATURE: 97.6 F | HEIGHT: 58 IN

## 2023-06-09 DIAGNOSIS — H92.02 OTALGIA, LEFT EAR: ICD-10-CM

## 2023-06-09 DIAGNOSIS — J34.2 DEVIATED NASAL SEPTUM: ICD-10-CM

## 2023-06-09 DIAGNOSIS — H61.22 IMPACTED CERUMEN, LEFT EAR: ICD-10-CM

## 2023-06-09 PROCEDURE — 31231 NASAL ENDOSCOPY DX: CPT

## 2023-06-09 PROCEDURE — 99204 OFFICE O/P NEW MOD 45 MIN: CPT | Mod: 25

## 2023-06-09 PROCEDURE — 69210 REMOVE IMPACTED EAR WAX UNI: CPT

## 2023-06-09 NOTE — END OF VISIT
[FreeTextEntry3] : I, Dr. Lucas personally performed the evaluation and management (E/M) services including all necessary procedures, for this new patient. That E/M includes conducting the clinically appropriate initial history &/or exam, assessing all conditions, and establishing the plan of care. Today, my KEN, Nirmala Ragland, was here to observe &/or participate in the visit & follow plan of care established by me.\par \par \par

## 2023-06-09 NOTE — HISTORY OF PRESENT ILLNESS
[de-identified] : PAtient has been having issues with her hearing aid dome bothering her. She has felt some mild throbbing int he left ear but it was a more severe discomfort. She stopped wearing the hearing aid on the left side. SHe does not have any drainage from the ear.

## 2023-06-09 NOTE — CONSULT LETTER
[Dear  ___] : Dear  [unfilled], [Consult Letter:] : I had the pleasure of evaluating your patient, [unfilled]. [Please see my note below.] : Please see my note below. [Consult Closing:] : Thank you very much for allowing me to participate in the care of this patient.  If you have any questions, please do not hesitate to contact me. [Sincerely,] : Sincerely, [FreeTextEntry3] : William Lucas MD\par [facility]\par Otolaryngology and Facial Plastics\par [employer] Professor, [employer]\par

## 2023-06-09 NOTE — ASSESSMENT
[FreeTextEntry1] : Referred for a foreign body or a black spot in the left ear has hearing aids seems to be a scab was removed no further intervention indicated she also has some irritation of the right nasal cavity for which she uses mupirocin at times endoscopically severely deviated septum right nasal cavity no tumors masses or polyps gave her a refill to use as needed especially during the winter otherwise no further acute interventions indicated.

## 2023-06-09 NOTE — PHYSICAL EXAM
[Midline] : trachea located in midline position [Normal] : no rashes [Nasal Endoscopy Performed] : nasal endoscopy was performed, see procedure section for findings [] : septum deviated to the left [de-identified] : small scab in the left EAC

## 2023-06-09 NOTE — REVIEW OF SYSTEMS
[As Noted in HPI] : as noted in HPI [Ear Pain] : ear pain [Hearing Loss] : hearing loss [Negative] : Heme/Lymph

## 2023-08-01 ENCOUNTER — APPOINTMENT (OUTPATIENT)
Dept: CT IMAGING | Facility: IMAGING CENTER | Age: 78
End: 2023-08-01
Payer: MEDICARE

## 2023-08-01 ENCOUNTER — OUTPATIENT (OUTPATIENT)
Dept: OUTPATIENT SERVICES | Facility: HOSPITAL | Age: 78
LOS: 1 days | End: 2023-08-01
Payer: MEDICARE

## 2023-08-01 DIAGNOSIS — Z90.722 ACQUIRED ABSENCE OF OVARIES, BILATERAL: Chronic | ICD-10-CM

## 2023-08-01 DIAGNOSIS — R91.1 SOLITARY PULMONARY NODULE: ICD-10-CM

## 2023-08-01 DIAGNOSIS — Z98.890 OTHER SPECIFIED POSTPROCEDURAL STATES: Chronic | ICD-10-CM

## 2023-08-01 PROCEDURE — 71250 CT THORAX DX C-: CPT

## 2023-08-01 PROCEDURE — 71250 CT THORAX DX C-: CPT | Mod: 26,MH

## 2023-08-06 ENCOUNTER — EMERGENCY (EMERGENCY)
Facility: HOSPITAL | Age: 78
LOS: 1 days | Discharge: HOME | End: 2023-08-06
Attending: EMERGENCY MEDICINE | Admitting: EMERGENCY MEDICINE
Payer: MEDICARE

## 2023-08-06 VITALS
HEART RATE: 99 BPM | DIASTOLIC BLOOD PRESSURE: 83 MMHG | SYSTOLIC BLOOD PRESSURE: 143 MMHG | OXYGEN SATURATION: 100 % | TEMPERATURE: 98 F | RESPIRATION RATE: 19 BRPM

## 2023-08-06 DIAGNOSIS — Z90.722 ACQUIRED ABSENCE OF OVARIES, BILATERAL: Chronic | ICD-10-CM

## 2023-08-06 DIAGNOSIS — Z98.890 OTHER SPECIFIED POSTPROCEDURAL STATES: Chronic | ICD-10-CM

## 2023-08-06 PROCEDURE — 12011 RPR F/E/E/N/L/M 2.5 CM/<: CPT

## 2023-08-06 PROCEDURE — 99284 EMERGENCY DEPT VISIT MOD MDM: CPT | Mod: 25,GC

## 2023-08-06 RX ORDER — TETANUS TOXOID, REDUCED DIPHTHERIA TOXOID AND ACELLULAR PERTUSSIS VACCINE, ADSORBED 5; 2.5; 8; 8; 2.5 [IU]/.5ML; [IU]/.5ML; UG/.5ML; UG/.5ML; UG/.5ML
0.5 SUSPENSION INTRAMUSCULAR ONCE
Refills: 0 | Status: COMPLETED | OUTPATIENT
Start: 2023-08-06 | End: 2023-08-06

## 2023-08-06 RX ORDER — ACETAMINOPHEN 500 MG
650 TABLET ORAL ONCE
Refills: 0 | Status: COMPLETED | OUTPATIENT
Start: 2023-08-06 | End: 2023-08-06

## 2023-08-06 RX ADMIN — TETANUS TOXOID, REDUCED DIPHTHERIA TOXOID AND ACELLULAR PERTUSSIS VACCINE, ADSORBED 0.5 MILLILITER(S): 5; 2.5; 8; 8; 2.5 SUSPENSION INTRAMUSCULAR at 23:48

## 2023-08-06 NOTE — ED PROVIDER NOTE - PATIENT PORTAL LINK FT
You can access the FollowMyHealth Patient Portal offered by [facility] by registering at the following website: [email or web address]. By joining [facility]’s FollowMyHealth portal, you will also be able to view your health information using other applications (apps) compatible with our system.

## 2023-08-06 NOTE — ED PROVIDER NOTE - PHYSICAL EXAMINATION
HEENT: 1cm across and ~0.5cm deep laceration noted on chin, lightly bleeding, no purulent discharge, no bone visualized. Sclera clear, no conjunctival pallor. EOMI, PERRLA. No nasal discharge. Throat: no erythema, no exudates on tonsils, uvula midline, no intraoral lesions/injuries.  Cardiac: RRR, +S1/S2. No murmurs, rubs, or gallops.   Chest: CTAB. No rales, rhonchi, or wheezing.   Abdomen: Soft, nontender, nondistended. No guarding, no rebound tenderness.  Neuro: Alert and oriented x3. Motor strength and sensation intact.    Extremities: Small abrasions on R palm, full ROM in UE and wrists b/l, NTTTP. HEENT: 2cm across and 1cm deep laceration noted on chin, lightly bleeding, no purulent discharge, no bone visualized. Sclera clear, no conjunctival pallor. EOMI, PERRLA. No nasal discharge. Throat: no erythema, no exudates on tonsils, uvula midline, no intraoral lesions/injuries.  Cardiac: RRR, +S1/S2. No murmurs, rubs, or gallops.   Chest: CTAB. No rales, rhonchi, or wheezing.   Abdomen: Soft, nontender, nondistended. No guarding, no rebound tenderness.  Neuro: Alert and oriented x3. Motor strength and sensation intact.    Extremities: Small abrasions on R palm, full ROM in UE and wrists b/l, NTTTP.

## 2023-08-06 NOTE — ED ADULT TRIAGE NOTE - CHIEF COMPLAINT QUOTE
Pt c/o laceration to chin s/p trip and fall. Denies LOC, blood thinners, head pain, n/v. Well appearing, bleeding controlled.

## 2023-08-06 NOTE — ED PROVIDER NOTE - ATTENDING CONTRIBUTION TO CARE
77-year-old female with history of hypertension, GERD, lung CA status post resection presents status post fall.  Patient reports she tripped on the edge of carpet walking into her building from the garage.  She fell with outstretched arms and hit her chin against the ground.  Denies any LOC.  She was able to get up after the injury and has been ambulatory since.  Patient sustained a laceration to her chin with initial bleeding which is since resolved.  Patient states she washed it and applied Neosporin.  No loose or broken teeth.  Denies any headache, vision changes, neck pain or stiffness, weakness.  Denies any other injuries.    Well appearing, sitting comfortably in stretcher, awake and alert, nontoxic.  AF/VSS.  NCAT EOMI PERRL.  Dentition intact, no malocclusion, no loose or fractured teeth.  (+)minimal ecchymosis to inside of lower lip, no intraoral laceration.  (+)1cm linear horizontal lac to chin, no active bleeding.  No facial assymmtery.  Neck supple, no midline spinal tenderness.  Lungs cta bl.  Cards nl S1/S2, RRR, no MRG.  Abd soft ntnd.  Pelvis is stable, all extremities are intact without gross deformities and FROM x 4.    Patient with mechanical fall sustaining laceration to chin.  No evidence of intraoral injury.  Exam is otherwise atraumatic and there are no other injuries.  Will update tetanus, pain medications, lac repair.  No deformities or other traumatic findings to warrant imaging at this time.

## 2023-08-06 NOTE — ED PROVIDER NOTE - OBJECTIVE STATEMENT
Pt is a 78yo F w pmhx HLD, HTN, lung CA s/p lung nodule removal (being followed by pulm) presents to the ED s/p mechanical fall. Pt denies LOC, not on any AC. Pt states she tripped on a carpet and braced fall with her hands and hit her chin against the thin carpet overlying concrete. Pt denies any symptoms preceding the fall, only minor pain in chin in area of small lac. Pt states she wash area with water and applied neosporin, has been holding tissue against lac as it is still bleeding. Denies any headache, dizziness, visual/hearing changes, numbness/paresthesias, restricted ROM.

## 2023-08-06 NOTE — ED PROVIDER NOTE - CLINICAL SUMMARY MEDICAL DECISION MAKING FREE TEXT BOX
Royce, PGY1 Anesthesiology:  Pt is a 78yo F w pmhx HLD, HTN, lung CA s/p lung nodule removal (being followed by pulm) presents to the ED s/p mechanical fall, no LOC, not on AC, facial trauma to chin with laceration. Denies any preceding symptoms, no symptoms other than mild pain in area of wound and continued mild bleeding. Vitals wnl, on exam 2cm across and 1cm deep laceration noted on chin, lightly bleeding, no purulent discharge, no bone visualized. Repaired laceration with 3 simple interrupted 4-0 nylon sutures, no more bleeding after stitching. Pt instructed to f/u w pmd, urgent care, or ED for suture removal in 1 wk.

## 2023-08-06 NOTE — ED PROVIDER NOTE - NSFOLLOWUPINSTRUCTIONS_ED_ALL_ED_FT
Please follow-up with your primary medical doctor, urgent care, or return to the ED for removal of your sutures in 1 week.     Please take Tylenol / Ibuprofen as needed for pain. You may ice your wound for 15-20 minutes at a time, leaving 30-40 in between each icing session. Avoid strenuous activity/conditions that may cause injuries to the area of your wound.    A laceration is a cut that goes through all of the layers of the skin and into the tissue that is right under the skin. Some lacerations heal on their own. Others need to be closed with skin adhesive strips, skin glue, stitches (sutures), or staples. Proper laceration care minimizes the risk of infection and helps the laceration to heal better.  If non-absorbable stitches or staples have been placed, they must be taken out within the time frame instructed by your healthcare provider.    SEEK IMMEDIATE MEDICAL CARE IF YOU HAVE ANY OF THE FOLLOWING SYMPTOMS: swelling around the wound, worsening pain, drainage from the wound, red streaking going away from your wound, inability to move finger or toe near the laceration, or discoloration of skin near the laceration.

## 2023-08-06 NOTE — ED PROVIDER NOTE - PROGRESS NOTE DETAILS
Royce, PGY1 Anesthesiology:  chin lac repaired with lido+epi local, 3 simple interrupted sutures w 4-0 ethilon, applied bacitracin ointment, covered w gauze and tape. Pt tolerated procedure well.  To receive Tylenol and TDaP, then discharge.

## 2023-08-06 NOTE — ED ADULT TRIAGE NOTE - CCCP TRG CHIEF CMPLNT
fall, laceration - HFpEF (EF 75% in 03/2022), AS s/p TAVR (09/2021)  - Continue home Toprol XL 100mg daily   - Bumex 2mg PO BID  - TTE shows EF 65% with mold pulm HTN and mod TR (no significant change from prior)  - Strict I/Os, daily weights; Appreciate cards recs

## 2023-08-06 NOTE — ED PROVIDER NOTE - NS ED ROS FT
General: denies fever, chills, sweats  HEENT: denies headache, dizziness, changes in vision/blurriness, double vision, eye pain/discharge, changes in hearing, ear pain/discharge, dysphagia  Cardio: denies chest pain, palpitations, leg pain/swelling  Resp: denies SOB, cough  GI: denies abdominal pain, nausea, vomiting, diarrhea, constipation  : denies dysuria, frequency, hematuria, flank pain  Neuro: denies headache, dizziness, change in level of consciousness, numbness, paraesthesia, seizures, balance problems, neck stiffness

## 2023-08-07 RX ADMIN — Medication 650 MILLIGRAM(S): at 00:35

## 2023-08-07 NOTE — ED ADULT NURSE NOTE - OBJECTIVE STATEMENT
Pt seen and treated in the ED for trip and fall, laceration to chin noted. Denies LOC, blood thinner or any acute distress. Lac repaired at beside with clean dressing applied. Plan of care continues.

## 2023-08-07 NOTE — ED ADULT NURSE NOTE - NSFALLUNIVINTERV_ED_ALL_ED
Bed/Stretcher in lowest position, wheels locked, appropriate side rails in place/Call bell, personal items and telephone in reach/Instruct patient to call for assistance before getting out of bed/chair/stretcher/Non-slip footwear applied when patient is off stretcher/[place] to call system/Physically safe environment - no spills, clutter or unnecessary equipment/Purposeful proactive rounding/Room/bathroom lighting operational, light cord in reach

## 2023-08-08 ENCOUNTER — APPOINTMENT (OUTPATIENT)
Dept: THORACIC SURGERY | Facility: CLINIC | Age: 78
End: 2023-08-08
Payer: MEDICARE

## 2023-08-08 VITALS
HEIGHT: 58 IN | OXYGEN SATURATION: 97 % | SYSTOLIC BLOOD PRESSURE: 131 MMHG | BODY MASS INDEX: 25.4 KG/M2 | HEART RATE: 81 BPM | RESPIRATION RATE: 16 BRPM | WEIGHT: 121 LBS | DIASTOLIC BLOOD PRESSURE: 64 MMHG

## 2023-08-08 PROCEDURE — 99213 OFFICE O/P EST LOW 20 MIN: CPT

## 2023-08-08 NOTE — CONSULT LETTER
[Dear  ___] : Dear  [unfilled], [Please see my note below.] : Please see my note below. [Sincerely,] : Sincerely, [FreeTextEntry2] : Dr. Jean Valdovinos (Pulm/Ref)   [FreeTextEntry3] : Derek Bravo MD, FACS  Chief, Division of Thoracic Surgery  Director, Minimally Invasive Thoracic Surgery  Department of Cardiovascular and Thoracic Surgery  [facility]  , Cardiovascular and Thoracic Surgery  [facility] [employer] [facility]

## 2023-08-08 NOTE — HISTORY OF PRESENT ILLNESS
[FreeTextEntry1] : Ms. HOSEA GRAY, 77 year old female, former smoker (3-4PPDx 15 years; Quit in 1980's), w/ hx of History of Johnson's esophagus; lung cancer, status post RUL resection and RLL superior segmentectomy 2/25/2014 at [facility] (Stage I lung cancer?), crest syndrome, Has been undergoing active surveillance. Referred in March, 2022 for RUL nodule  PFTs on 3/8/22: FVC: 1.78 (84%); FEV1: 1.25 (78%); DLCO: 10.42 (59%)   CT Chest on 2/28/22: ([facility]) - Stable post op changes within the right hemithorax - Evolving, irregular, noncalcified semisolid RUL nodule: 9mm (Series 11, Image 134). Solid component measures 5mm. Solid component not present on CT from 9/4/15 and now solid component is more prominent with compared to CT from 8/24/21. The overall size of the lesion has increased from 9/4/15.  - Additional subcentimeter noncalcified solid and groundglass pulmonary nodules are redemonstrated and unchanged.  - Tubular branching soft tissue is again demonstrate within the posterior RLL likely representing mucoid impaction, unchanged.  - Stable, tubular branching soft tissue density is present within the periphery of the left lower lobe as well, unchanged, again, favor mucoid impaction.  - Left adrenal myolipoma   PET/CT on 3/11/22: ([facility]) - Non - FDG avid 9 x 7 mm irregular nodule in RUL (image 33) - New 1.2 x 1 cm nodular opacity in the RLL at the postsurgical site (Series 4, image 26) SUV 1.3  - Tubular branching soft tissue in the RUL without hypermetabolic uptake, likely mucoid impaction; 4 mm Peripheral nodule in the LL (Image 48) - No evidence of FDG avid lymphadenopathy   Patient was given a course of ABX on 04/05/2022 for 7 days.   CT chest on 05/23/2022: - Along the superior aspect of the right lower lobe suture is a 1.2 x 1.0 cm solid nodule series 10 image 123 which is unchanged from prior PET/CT but new from 2/28/2022. This was FDG avid. - Clustered nodules and tubular presumed airway impaction inferior to this nodule in the right lower lobe is unchanged. - 9 x 7 mm part solid nodule in the right upper lobe on series 6 image 135 is unchanged since 2/28/2022 but increased in size and density since 2/2/2021. - Other small solid and some solid nodules are stable, annotated series 10. Airway impaction and centrilobular nodules in the basilar left lower lobe are unchanged, series 10 image 241.  Now, s/p Flexible bronchoscopy, redo uniportal right video-assisted thoracic surgery, pneumonolysis, wedge resection of right upper lobe x1 and right lower lobe x2, and mediastinal lymph node dissection on 6/17/22. Path of RUL wedge resection revealed adenocarcinoma, acinar predominant, 0.8 x 0.8 x 0.5 cm, G2, pT1a N0. RLL wedge resection revealed nonnecrotizing granulomas and focal necrotizing. Additional Findings: Emphysema. Metallic coil identified in part # 1 Special stains: AFB stain is negative for acid fast bacilli. GMS stain is negative for fungal organisms.  postoperative course was complicated by small air leak that eventually resolved. and she was discharged home on 6/21/22 with a 10-day course of antibiotic for suspected RLL PNA. Contracted Hives on Day 5 of Augmenting. Contacted service line and was advised to d/c.  Patient was seen on 06/28/2022. CXR showed Moderate right pleural effusion, recommended to RTC in 1 week with repeat CT Chest to further evaluate as well as to discuss final path.   CT chest on 06/28/2022:  - Stable post op findings.  Patient was evaluated by Dr. Kirkland on 07/13/2022, there is no role for systemic adjuvant tx.   CT chest on 09/28/2022:  - Status post right upper and lower lobe wedge resections. Decreased, now trace right pleural effusion with resolved pleural air component. - Stable 1.8 cm hypodense tubular right lower lobe nodule, possibly chronic bronchial impaction.  CT chest on 1/24/23:  - Post op changes - 1.8 cm nodular opacity in RLL; Unchanged in its size and appearance when compared to previous exam.  - Few cluster of tiny nodules in the LLL likely representing mucoid impacted distal small airways are also unchanged.  - Below the diaphragm, visualized portions of the abdomen demonstrate left adrenal gland myelolipoma.  CT Chest on 4/25/23:  - Stable postoperative changes following right upper and lower lobe wedge resections.  - Unchanged 1.8 cm nodular opacity in the RLL (series 2, image 46). Stable clustered tree-in-bud nodularity in the posterior LLL   CT Chest on 08/01/2023: - Status post right upper and lower lobe wedge resections.  - Stable 1.7 cm right lower lobe tubular density on series 2, image 50, as well as a small cluster of nodules in the left lower lobe on images 81-83 likely due to small airways disease no new nodules.  -Unchanged mild right pleural thickening.  Pt presents today for follow up. Today, patient denies worsening SOB, chest pain, cough, hemoptysis, fever, chills, night sweats, lightheadedness or dizziness.

## 2023-08-08 NOTE — PHYSICAL EXAM
[] : no respiratory distress [Respiration, Rhythm And Depth] : normal respiratory rhythm and effort [Exaggerated Use Of Accessory Muscles For Inspiration] : no accessory muscle use [Auscultation Breath Sounds / Voice Sounds] : lungs were clear to auscultation bilaterally [Heart Rate And Rhythm] : heart rate was normal and rhythm regular [Examination Of The Chest] : the chest was normal in appearance [Chest Visual Inspection Thoracic Asymmetry] : no chest asymmetry [Diminished Respiratory Excursion] : normal chest expansion [2+] : left 2+ [Skin Color & Pigmentation] : normal skin color and pigmentation [Oriented To Time, Place, And Person] : oriented to person, place, and time

## 2023-08-08 NOTE — ASSESSMENT
[FreeTextEntry1] : Ms. HOSEA GRAY, 77 year old female, former smoker (3-4PPDx 15 years; Quit in 1980's), w/ hx of History of Johnson's esophagus; lung cancer, status post RUL resection and RLL superior segmentectomy 2/25/2014 at [facility] (Stage I lung cancer?), crest syndrome, Has been undergoing active surveillance. Referred in March, 2022 for RUL nodule  Now, s/p Flexible bronchoscopy, redo uniportal right video-assisted thoracic surgery, pneumonolysis, wedge resection of right upper lobe x1 and right lower lobe x2, and mediastinal lymph node dissection on 6/17/22. Path of RUL wedge resection revealed adenocarcinoma, acinar predominant, 0.8 x 0.8 x 0.5 cm, G2, pT1a N0. RLL wedge resection revealed nonnecrotizing granulomas and focal necrotizing. Additional Findings: Emphysema. Metallic coil identified in part # 1 Special stains: AFB stain is negative for acid fast bacilli. GMS stain is negative for fungal organisms.  postoperative course was complicated by small air leak that eventually resolved. and she was discharged home on 6/21/22 with a 10-day course of antibiotic for suspected RLL PNA. Contracted Hives on Day 5 of Augmenting. Contacted service line and was advised to d/c.  Patient was evaluated by Dr. Kirkland on 07/13/2022, there is no role for systemic adjuvant tx.   CT Chest on 08/01/2023: - Status post right upper and lower lobe wedge resections.  - Stable 1.7 cm right lower lobe tubular density on series 2, image 50, as well as a small cluster of nodules in the left lower lobe on images 81-83 likely due to small airways disease no new nodules.  -Unchanged mild right pleural thickening.  I have reviewed the patient's medical records and diagnostic images at time of this office consultation and have made the following recommendation: 1. CT reviewed; Stable findings. Discussed repeating a non contrast CT in 3 months to re-evaluate stability. She is agreeable.   Recommendations reviewed with patient during this office visit, and all questions answered; Patient instructed on the importance of follow up and verbalizes understanding.  I, FARRAH IngramIM, personally performed the evaluation and management (E/M) services for this established patient. That E/M includes conducting the examination, assessing all new/exacerbated conditions, and establishing a new plan of care. Today, My ACP, Desii Munguia, was here to observe my evaluation and management services for this patient to be followed going forward.

## 2023-08-14 ENCOUNTER — APPOINTMENT (OUTPATIENT)
Dept: PULMONOLOGY | Facility: CLINIC | Age: 78
End: 2023-08-14
Payer: MEDICARE

## 2023-08-14 VITALS — OXYGEN SATURATION: 100 % | DIASTOLIC BLOOD PRESSURE: 69 MMHG | HEART RATE: 80 BPM | SYSTOLIC BLOOD PRESSURE: 108 MMHG

## 2023-08-14 PROCEDURE — 99213 OFFICE O/P EST LOW 20 MIN: CPT | Mod: 25

## 2023-08-14 PROCEDURE — 94010 BREATHING CAPACITY TEST: CPT

## 2023-08-15 NOTE — ASSESSMENT
[FreeTextEntry1] : Ms. HOSEA GRAY is an 77 year old female, history of asthma, lung cancer. Lung function seems to be improving based on Spirometry results today. Advised patient to reduce Advair intake to once a day as she has improved significantly standpoint and current inhaler is is cost prohibitive.

## 2023-08-15 NOTE — ADDENDUM
[FreeTextEntry1] : I, Codie Gukevin, acted solely as a scribe for Dr. Jean Valdovinos M.D. on this date 08/14/2023.   All medical record entries made by the Scribe were at my, Dr. Jean Valdovinos M.D., direction and personally dictated by me on 08/14/2023. I have reviewed the chart and agree that the record accurately reflects my personal performance of the history, physical exam, assessment and plan. I have also personally directed, reviewed, and agreed with the chart.

## 2023-09-10 DIAGNOSIS — M25.561 PAIN IN RIGHT KNEE: ICD-10-CM

## 2023-09-10 DIAGNOSIS — M25.562 PAIN IN RIGHT KNEE: ICD-10-CM

## 2023-09-13 ENCOUNTER — APPOINTMENT (OUTPATIENT)
Dept: ORTHOPEDIC SURGERY | Facility: CLINIC | Age: 78
End: 2023-09-13
Payer: MEDICARE

## 2023-09-13 VITALS — HEIGHT: 58 IN | WEIGHT: 120 LBS | BODY MASS INDEX: 25.19 KG/M2

## 2023-09-13 PROCEDURE — 20610 DRAIN/INJ JOINT/BURSA W/O US: CPT | Mod: 50

## 2023-09-13 PROCEDURE — 73564 X-RAY EXAM KNEE 4 OR MORE: CPT | Mod: 50

## 2023-09-13 PROCEDURE — 99213 OFFICE O/P EST LOW 20 MIN: CPT | Mod: 25

## 2023-09-20 ENCOUNTER — APPOINTMENT (OUTPATIENT)
Dept: ORTHOPEDIC SURGERY | Facility: CLINIC | Age: 78
End: 2023-09-20
Payer: MEDICARE

## 2023-09-20 PROCEDURE — 20610 DRAIN/INJ JOINT/BURSA W/O US: CPT | Mod: 50

## 2023-09-27 ENCOUNTER — APPOINTMENT (OUTPATIENT)
Dept: ORTHOPEDIC SURGERY | Facility: CLINIC | Age: 78
End: 2023-09-27
Payer: MEDICARE

## 2023-09-27 PROCEDURE — 20610 DRAIN/INJ JOINT/BURSA W/O US: CPT | Mod: 50

## 2023-10-04 ENCOUNTER — APPOINTMENT (OUTPATIENT)
Dept: ORTHOPEDIC SURGERY | Facility: CLINIC | Age: 78
End: 2023-10-04
Payer: MEDICARE

## 2023-10-04 PROCEDURE — 20610 DRAIN/INJ JOINT/BURSA W/O US: CPT | Mod: 50

## 2023-10-18 NOTE — ED PROVIDER NOTE - ATTENDING CONTRIBUTION TO CARE
Maribell Crespo is a 28 year oldfemale here for   Chief Complaint   Patient presents with   • Office Visit   • Gyn Exam     Sti screening    • Medication Dose Change     buPROPion XL (WELLBUTRIN XL) 300 MG 24 hr tablet, pt would like to change dosage.        Here for urgent care follow up, was recently treated for trichomonas and yeast.  Vaginal burning and itching has improved since treatment.  Her sexual partner was treated also.  She is still sexually active with the same partner and would like full panel repeated.  She also reports she was told of possible UTI at urgent care and would like urine dip repeated, although no symptoms of concern currently.     Would also like to discuss increasing her bupropion dose- has found significant improvement on it and doesn't want to change medication- but still worried, anxious and irritable.  She isn't sure if it is the work or kid related stress contributing.  She did restart her propranol at her last visit as we discussed, but didn't find that helpful    Gyn Exam  Pertinent negatives include no chills, dysuria, fever, frequency, hematuria or urgency.       Review of Systems   Constitutional: Negative for chills and fever.   Respiratory: Negative for cough and shortness of breath.    Cardiovascular: Negative for chest pain and leg swelling.   Genitourinary: Negative for dysuria, frequency, hematuria and urgency.        Denies vaginal itching, odor or discharge   Psychiatric/Behavioral:        Per HPI       ALLERGIES:   Allergen Reactions   • Adhesive   (Environmental) RASH       Current Outpatient Medications   Medication Sig   • buPROPion  MG extended-release  tablet Take one tablet PO  Daily (new dose)   • propRANolol (INDERAL LA) 80 MG 24 hr capsule TAKE 1 CAPSULE BY MOUTH EVERY DAY FOR HEADACHE PREVENTION   • pantoprazole (PROTONIX) 40 MG tablet Take 1 tablet by mouth daily.   • Norgestimate-Ethinyl Estradiol (Tri-Lo-Sprintec) 0.18/0.215/0.25 MG-25 MCG tablet  Take 1 tablet by mouth daily.   • nystatin (MYCOSTATIN) 475666 UNIT/GM cream Apply 1 application. topically in the morning and 1 application. in the evening. (to irritated area under abdominal skin)   • Multiple Vitamins-Iron (Multivitamin Plus Iron Adult) Tab Take 1 tablet by mouth daily. (patient takes a bariatric specific multivitamin recommended by her surgeon)     No current facility-administered medications for this visit.       Active Ambulatory Problems     Diagnosis Date Noted   • Acid reflux 09/12/2016   • Acne 11/25/2014   • Anxiety 07/17/2017   • Bilateral hip pain 03/04/2015   • Depression 12/09/2019   • Dysmenorrhea 08/18/2020   • Menorrhagia with regular cycle 08/18/2020   • Essential hypertension 11/15/2022   • Obesity (BMI 30.0-34.9) 11/15/2022   • Anal fissure 08/21/2023   • History of gastric bypass 09/08/2023     Resolved Ambulatory Problems     Diagnosis Date Noted   • No Resolved Ambulatory Problems     No Additional Past Medical History       History reviewed. No pertinent past medical history.    Past Surgical History:   Procedure Laterality Date   • Gastric bypass Bilateral 03/20/2023   • Pelvis/hip joint surgery unlisted Bilateral        Family History   Problem Relation Age of Onset   • Patient is unaware of any medical problems Mother    • Patient is unaware of any medical problems Father    • Cancer, Esophageal Maternal Grandmother        Social History     Socioeconomic History   • Marital status: Single     Spouse name: Not on file   • Number of children: Not on file   • Years of education: Not on file   • Highest education level: Not on file   Occupational History   • Not on file   Tobacco Use   • Smoking status: Never     Passive exposure: Never   • Smokeless tobacco: Never   • Tobacco comments:     NEVER   Vaping Use   • Vaping Use: never used   Substance and Sexual Activity   • Alcohol use: Yes     Comment: pt stated social only.   • Drug use: Never     Comment: NEVER   • Sexual  activity: Yes     Partners: Male     Birth control/protection: Pill   Other Topics Concern   • Not on file   Social History Narrative   • Not on file     Social Determinants of Health     Financial Resource Strain: Not on file   Food Insecurity: Not on file   Transportation Needs: Not on file   Physical Activity: Inactive (6/8/2022)    Exercise Vital Sign    • Days of Exercise per Week: 0 days    • Minutes of Exercise per Session: 0 min   Stress: Low Risk  (6/8/2022)    Stress    • Social Determinants: Stress: Not at all   Social Connections: Not on file   Intimate Partner Violence: Not on file       Visit Vitals  /71 (BP Location: LUE - Left upper extremity, Patient Position: Sitting, Cuff Size: Large Adult)   Pulse 80   Temp 98.1 °F (36.7 °C) (Oral)   Resp 18   Ht 5' 4\" (1.626 m)   Wt 81 kg (178 lb 7.4 oz)   LMP 09/08/2023   SpO2 100%   BMI 30.63 kg/m²       Physical Exam  Vitals reviewed.   Abdominal:      Palpations: Abdomen is soft.      Tenderness: There is no abdominal tenderness.   Genitourinary:     Labia:         Right: No rash.         Left: No rash.       Vagina: Normal. No vaginal discharge or lesions.   Neurological:      Mental Status: She is alert. Mental status is at baseline.   Psychiatric:         Mood and Affect: Mood normal.         Lab Results    Latest Reference Range & Units 10/03/23 09:07   Candida spp Not Detected  Positive !   Candida glabrata Not Detected  Not Detected   CHLAMYDIA/GONORRHEA BY NUCLEIC ACID AMPLIFICATION  Rpt   TRICHOMONAS VAGINALIS Not Detected  Positive !   SWABONE VAGINITIS PANEL  Rpt !   !: Data is abnormal  Rpt: View report in Results Review for more information    POCT Urine Dip Auto  Order: [id number]   Status: Final result      Visible to patient: Yes (not seen)      Next appt: 10/23/2023 at 09:15 AM in Surgery (Shaheed Mares MD)      Dx: Urinary tract infection without hemat...      0 Result Notes      Component  Ref Range & Units 10/18/23 1520   POCT  Color Straw    POCT Appearance Clear    POCT Glucose Urine  Negative, Normal mg/dL Negative    POCT Bilirubin  Negative, About 10 Devendra/ul, 5-10 Devendra/ul, 50 Devendra/ul Negative    POCT Ketones  Negative, 100 mg/dl mg/dL Negative    POCT Specific Gravity  1.005, 1.010, 1.015, 1.020, 1.025, 1.030 1.030    POCT Occult Blood  Negative Negative    POCT pH  5.0, 6.0, 7.0, 5.5, 6.5 5.5    POCT Protein  Negative, 1000 mg/dl, 20 mg/dl, 40 mg/dl, 2000 mg/dl mg/dL Negative    POCT Urobilinogen  1.0, 0.2, Normal mg/dL 1.0    Urine Nitrite  Negative Negative    WBC (Leukocyte) Esterase POC  Negative Trace Abnormal              Assessment     Problem List Items Addressed This Visit        Mental Health    Anxiety    Relevant Medications    buPROPion  MG extended-release  tablet    Depression    Relevant Medications    buPROPion  MG extended-release  tablet   Other Visit Diagnoses     Urinary tract infection without hematuria, site unspecified    -  Primary    Relevant Orders    POCT Urine Dip Auto (Completed)    History of trichomonal vaginitis        Relevant Orders    SwabOne Vaginitis Panel    SwabOne Mycoplasma Genitalium    Chlamydia/Gonorrhea by Nucleic Acid Amplification    Routine screening for STI (sexually transmitted infection)        Relevant Orders    SwabOne Vaginitis Panel    SwabOne Mycoplasma Genitalium    Chlamydia/Gonorrhea by Nucleic Acid Amplification           Health Maintenance Summary     COVID-19 Vaccine (4 - Moderna series)  Overdue since 3/2/2022    Cervical Cancer Screening - <30 3 year (Every 3 Years)  Next due on 10/13/2024    Depression Screening (Yearly)  Next due on 10/18/2024    DTaP/Tdap/Td Vaccine (8 - Td or Tdap)  Next due on 7/25/2032    Hepatitis B Vaccine   Completed    Meningococcal Vaccine   Aged Out    HPV Vaccine   Completed    Influenza Vaccine   Completed    Pneumococcal Vaccine 0-64   Aged Out        Recently treated for yeast and trichomonas with resolution in symptoms- will  repeat swabone panel, mycoplasma and gc/chlamydia per patient concern  Urine dip today looks ok- not suggestive of UTI    Depression/anxiety- overall has noted improvement on the bupropion XL, but is wanting to increase dose.  Will increase to 450mg po daily    Schedule follow up: in 6 months or sooner if no improvement in depression/anxiety symptoms    Madina Webster PA-C   LOcurto  pt c/o head lac s/p rolling out of bed  no LOC  no weakness or numbness no nausea    exam  lac to left parietal area    no Angel signs or raccoon eyes  nl strnth and sensation b/l  no cervical tenderness   card RRR  (when standing) LOcurto  pt c/o head lac s/p rolling out of bed  no LOC  no weakness or numbness no nausea    exam  lac to left parietal area    no Angel signs or raccoon eyes  nl strenth and sensation b/l  no cervical tenderness   card RRR  (when standing)  no dizziness or balance difficulty when standing  CBC  and head CT performed  tetanus updated   lac repaited

## 2023-11-07 ENCOUNTER — APPOINTMENT (OUTPATIENT)
Dept: CT IMAGING | Facility: IMAGING CENTER | Age: 78
End: 2023-11-07
Payer: MEDICARE

## 2023-11-07 ENCOUNTER — OUTPATIENT (OUTPATIENT)
Dept: OUTPATIENT SERVICES | Facility: HOSPITAL | Age: 78
LOS: 1 days | End: 2023-11-07
Payer: MEDICARE

## 2023-11-07 DIAGNOSIS — C34.11 MALIGNANT NEOPLASM OF UPPER LOBE, RIGHT BRONCHUS OR LUNG: ICD-10-CM

## 2023-11-07 DIAGNOSIS — Z90.722 ACQUIRED ABSENCE OF OVARIES, BILATERAL: Chronic | ICD-10-CM

## 2023-11-07 DIAGNOSIS — Z98.890 OTHER SPECIFIED POSTPROCEDURAL STATES: Chronic | ICD-10-CM

## 2023-11-07 PROCEDURE — 71250 CT THORAX DX C-: CPT

## 2023-11-07 PROCEDURE — 71250 CT THORAX DX C-: CPT | Mod: 26,MH

## 2023-11-14 ENCOUNTER — APPOINTMENT (OUTPATIENT)
Dept: THORACIC SURGERY | Facility: CLINIC | Age: 78
End: 2023-11-14
Payer: MEDICARE

## 2023-11-14 ENCOUNTER — NON-APPOINTMENT (OUTPATIENT)
Age: 78
End: 2023-11-14

## 2023-11-14 VITALS
DIASTOLIC BLOOD PRESSURE: 57 MMHG | SYSTOLIC BLOOD PRESSURE: 119 MMHG | OXYGEN SATURATION: 100 % | HEIGHT: 58 IN | HEART RATE: 72 BPM | BODY MASS INDEX: 25.61 KG/M2 | WEIGHT: 122 LBS | RESPIRATION RATE: 16 BRPM

## 2023-11-14 PROCEDURE — 99213 OFFICE O/P EST LOW 20 MIN: CPT

## 2023-11-20 ENCOUNTER — APPOINTMENT (OUTPATIENT)
Dept: PULMONOLOGY | Facility: CLINIC | Age: 78
End: 2023-11-20
Payer: MEDICARE

## 2023-11-20 VITALS — HEART RATE: 78 BPM | SYSTOLIC BLOOD PRESSURE: 97 MMHG | DIASTOLIC BLOOD PRESSURE: 64 MMHG | OXYGEN SATURATION: 100 %

## 2023-11-20 PROCEDURE — 99213 OFFICE O/P EST LOW 20 MIN: CPT

## 2023-12-25 RX ORDER — FLUTICASONE PROPIONATE 50 UG/1
50 SPRAY, METERED NASAL
Qty: 1 | Refills: 0 | Status: ACTIVE | COMMUNITY
Start: 2023-12-25 | End: 1900-01-01

## 2023-12-25 RX ORDER — ALBUTEROL SULFATE 90 UG/1
108 (90 BASE) INHALANT RESPIRATORY (INHALATION)
Qty: 1 | Refills: 1 | Status: ACTIVE | COMMUNITY
Start: 2023-12-25 | End: 1900-01-01

## 2023-12-28 ENCOUNTER — APPOINTMENT (OUTPATIENT)
Dept: PULMONOLOGY | Facility: CLINIC | Age: 78
End: 2023-12-28
Payer: MEDICARE

## 2023-12-28 PROCEDURE — 99213 OFFICE O/P EST LOW 20 MIN: CPT | Mod: 95

## 2023-12-28 NOTE — ADDENDUM
[FreeTextEntry1] : I, Galomarisol Barney, acted solely as a scribe for Dr. Jean Valdovinos M.D. on this date 12/28/2023.   All medical record entries made by the Scribe were at my, Dr. Jean Valdovinos M.D., direction and personally dictated by me on 12/28/2023. I have reviewed the chart and agree that the record accurately reflects my personal performance of the history, physical exam, assessment and plan. I have also personally directed, reviewed, and agreed with the chart.

## 2023-12-28 NOTE — ASSESSMENT
[FreeTextEntry1] : Ms. HOSEA GRAY is an 78 year old female with asthma. COVID-19 symptoms significantly improved after starting course of Paxlovid. Currently on day 4 and will be taking it for 1 more day.   Discussed quarantine and the use of home COVID testing as a method to shorten necessary quarantine after COVID infection.  I told her that she has to have either 5 days of quarantine with a negative rapid COVID test or 10 days of isolation.

## 2023-12-28 NOTE — REASON FOR VISIT
[Home] : at home, [unfilled] , at the time of the visit. [Medical Office: ([facility])___] : at the medical office located in  [Patient] : the patient [Other: _____] : [unfilled] [Cough] : cough [TextBox_44] : Covid positive

## 2023-12-28 NOTE — HISTORY OF PRESENT ILLNESS
[TextBox_4] : HOSEA GRAY is a 78 year old female with history of asthma, who presents for a telehealth evaluation. Patient reports that her cough and other COVID-19 related symptoms has been improving. She states that she is currently on the fourth day of her Paxlovid course with significant improvement to her symptoms.

## 2024-01-07 ENCOUNTER — NON-APPOINTMENT (OUTPATIENT)
Age: 79
End: 2024-01-07

## 2024-02-05 ENCOUNTER — APPOINTMENT (OUTPATIENT)
Dept: ORTHOPEDIC SURGERY | Facility: CLINIC | Age: 79
End: 2024-02-05
Payer: MEDICARE

## 2024-02-05 VITALS
DIASTOLIC BLOOD PRESSURE: 74 MMHG | SYSTOLIC BLOOD PRESSURE: 112 MMHG | HEART RATE: 82 BPM | BODY MASS INDEX: 25.4 KG/M2 | WEIGHT: 121 LBS | HEIGHT: 58 IN

## 2024-02-05 DIAGNOSIS — M65.231 CALCIFIC TENDINITIS, RIGHT FOREARM: ICD-10-CM

## 2024-02-05 DIAGNOSIS — M65.312 TRIGGER THUMB, LEFT THUMB: ICD-10-CM

## 2024-02-05 PROCEDURE — 99214 OFFICE O/P EST MOD 30 MIN: CPT

## 2024-02-05 NOTE — ADDENDUM
[FreeTextEntry1] : I, Marcus Omer, acted solely as a scribe for Dr. Bhatia on this date on 02/05/2024.

## 2024-02-05 NOTE — HISTORY OF PRESENT ILLNESS
[Right] : right hand dominant [FreeTextEntry1] : She comes in today for evaluation of right hand pain that started last 6 days ago. She reports no known injury. She had it evaluated at [facility], where radiographs were obtained and she was instructed to wear a brace and take Advil. She reports that the pain has disappeared yesterday. She also complains of clicking and locking in her left thumb.

## 2024-02-05 NOTE — PHYSICAL EXAM
[de-identified] : - Constitutional: This is a female in no obvious distress.   - Psych: Patient is alert and oriented to person, place and time.  Patient has a normal mood and affect. - Cardiovascular: Normal pulses throughout the upper extremities.  No significant varicosities are noted in the upper extremities.  - Neuro: Strength and sensation are intact throughout the upper extremities.  Patient has normal coordination. - Respiratory:  Patient exhibits no evidence of shortness of breath or difficulty breathing. - Skin: No rashes, lesions, or other abnormalities are noted in the upper extremities.  ---  Examination of her right wrist and hand demonstrates no obvious swelling.  She describes pain ulnarly, but there is no localized swelling or tenderness along the FCU tendon, pisiform or other regions.  She is neurovascular intact distally.  Examination of her left hand and thumb demonstrates no obvious swelling.  There is no swelling or tenderness along the A1 pulley of the thumb.  There is very mild triggering.  She is neurovascularly intact distally. [de-identified] : I reviewed radiographs of her right wrist dated 1/30/2024 which demonstrate no acute fractures. There is arthritis of the DRUJ as well as a calcific deposit volar to the pisiform, likely consistent with FCU tendinitis.

## 2024-02-05 NOTE — END OF VISIT
[FreeTextEntry3] : This note was written by Marcus Omer on 02/05/2024 acting solely as a scribe for Dr. Miguel A Bhatia.   All medical record entries made by the Scribe were at my, Dr. Miguel A Bhatia, direction and personally dictated by me on 02/05/2024. I have personally reviewed the chart and agree that the record accurately reflects my personal performance of the history, physical exam, assessment and plan.

## 2024-02-05 NOTE — DISCUSSION/SUMMARY
[FreeTextEntry1] : She has findings consistent with resolved right wrist pain secondary to probable calcific FCU tendinitis.  She also has a very mild left trigger thumb.  I had a discussion regarding today's visit, the prognosis of this diagnosis and treatment recommendations and options.    With regard to the right wrist, at this time, as her symptoms have resolved, I recommended that she not receive any medical intervention at this time. If her right wrist symptoms return or persist, she will return to wearing her brace and taking Advil. She will follow up if her thumb symptoms persist or worsen.  With regard to her mild left trigger thumb, I recommended observation.  The patient has agreed to this plan of management and has expressed full understanding.  All questions were fully answered to the patient's satisfaction.  My cumulative time spent on this patient's visit included: Preparation for the visit, review of the medical records, review of pertinent diagnostic studies, examination and counseling of the patient on the above diagnosis, treatment plan and prognosis, orders of diagnostic tests, medications and/or appropriate procedures and documentation in the medical records of today's visit.

## 2024-02-06 ENCOUNTER — APPOINTMENT (OUTPATIENT)
Dept: CT IMAGING | Facility: IMAGING CENTER | Age: 79
End: 2024-02-06
Payer: MEDICARE

## 2024-02-06 ENCOUNTER — OUTPATIENT (OUTPATIENT)
Dept: OUTPATIENT SERVICES | Facility: HOSPITAL | Age: 79
LOS: 1 days | End: 2024-02-06
Payer: MEDICARE

## 2024-02-06 DIAGNOSIS — Z98.890 OTHER SPECIFIED POSTPROCEDURAL STATES: Chronic | ICD-10-CM

## 2024-02-06 DIAGNOSIS — C34.91 MALIGNANT NEOPLASM OF UNSPECIFIED PART OF RIGHT BRONCHUS OR LUNG: ICD-10-CM

## 2024-02-06 DIAGNOSIS — Z90.722 ACQUIRED ABSENCE OF OVARIES, BILATERAL: Chronic | ICD-10-CM

## 2024-02-06 PROCEDURE — 71250 CT THORAX DX C-: CPT

## 2024-02-06 PROCEDURE — 71250 CT THORAX DX C-: CPT | Mod: 26,MH

## 2024-02-13 ENCOUNTER — APPOINTMENT (OUTPATIENT)
Dept: THORACIC SURGERY | Facility: CLINIC | Age: 79
End: 2024-02-13
Payer: MEDICARE

## 2024-02-15 ENCOUNTER — APPOINTMENT (OUTPATIENT)
Dept: THORACIC SURGERY | Facility: CLINIC | Age: 79
End: 2024-02-15
Payer: MEDICARE

## 2024-02-15 PROCEDURE — 99442: CPT | Mod: 93

## 2024-02-17 NOTE — HISTORY OF PRESENT ILLNESS
[FreeTextEntry1] : Ms. HOSEA GRAY, 78 year old female, former smoker (3-4PPDx 15 years; Quit in 1980's), w/ hx of History of Johnson's esophagus; lung cancer, status post RUL resection and RLL superior segmentectomy 2/25/2014 at [facility] (Stage I lung cancer?), crest syndrome, Has been undergoing active surveillance. Referred in March, 2022 for RUL nodule  PFTs on 3/8/22: FVC: 1.78 (84%); FEV1: 1.25 (78%); DLCO: 10.42 (59%)  Patient was given a course of ABX on 04/05/2022 for 7 days.  Now, s/p Flexible bronchoscopy, redo uniportal right video-assisted thoracic surgery, pneumonolysis, wedge resection of right upper lobe x1 and right lower lobe x2, and mediastinal lymph node dissection on 6/17/22. Path of RUL wedge resection revealed adenocarcinoma, acinar predominant, 0.8 x 0.8 x 0.5 cm, G2, pT1a N0. RLL wedge resection revealed nonnecrotizing granulomas and focal necrotizing. Additional Findings: Emphysema. Metallic coil identified in part # 1 Special stains: AFB stain is negative for acid fast bacilli. GMS stain is negative for fungal organisms.  Postoperative course was complicated by small air leak that eventually resolved. and she was discharged home on 6/21/22 with a 10-day course of antibiotic for suspected RLL PNA. Contracted Hives on Day 5 of Augmentin. Contacted service line and was advised to d/c.  Patient was seen on 06/28/2022. CXR showed Moderate right pleural effusion, recommended to RTC in 1 week with repeat CT Chest to further evaluate as well as to discuss final path.  CT chest on 06/28/2022: - Stable post op findings.  Patient was evaluated by Dr. Kirkland on 07/13/2022, there is no role for systemic adjuvant tx.  CT chest on 09/28/2022: - Status post right upper and lower lobe wedge resections. Decreased, now trace right pleural effusion with resolved pleural air component. - Stable 1.8 cm hypodense tubular right lower lobe nodule, possibly chronic bronchial impaction.  CT chest on 1/24/23: - Post op changes - 1.8 cm nodular opacity in RLL; Unchanged in its size and appearance when compared to previous exam. - Few cluster of tiny nodules in the LLL likely representing mucoid impacted distal small airways are also unchanged. - Below the diaphragm, visualized portions of the abdomen demonstrate left adrenal gland myelolipoma.  CT Chest on 4/25/23: - Stable postoperative changes following right upper and lower lobe wedge resections. - Unchanged 1.8 cm nodular opacity in the RLL (series 2, image 46). Stable clustered tree-in-bud nodularity in the posterior LLL  CT Chest on 08/01/2023: - Status post right upper and lower lobe wedge resections. - Stable 1.7 cm right lower lobe tubular density on series 2, image 50, as well as a small cluster of nodules in the left lower lobe on images 81-83 likely due to small airways disease no new nodules. -Unchanged mild right pleural thickening.  CT on 11/7/2023:  - Unchanged scarring in the right lung from multiple wedge resections - Unchanged 1.7 cm RLL tubular nodule (2-48) - Unchanged small cluster of nodules in the LLL, likely mucus impacted bronchioles (2-79)  CT Chest on 2/6/2024: - Multiple right lung sublobar resections.  - Unchanged circumscribed tubular opacity in the right lower lobe, likely a mucous impacted airway (smaller compared to 2/2/2021).  - Unchanged small cluster of nodules in the left lower lobe, also likely due to mucus impaction.  Patient presents to office for follow up.

## 2024-02-17 NOTE — ASSESSMENT
[FreeTextEntry1] : Ms. HOSEA GRAY, 78 year old female, former smoker (3-4PPDx 15 years; Quit in 1980's), w/ hx of History of Johnson's esophagus; lung cancer, status post RUL resection and RLL superior segmentectomy 2/25/2014 at [facility] (Stage I lung cancer?), crest syndrome, Has been undergoing active surveillance. Referred in March, 2022 for RUL nodule  Now, s/p Flexible bronchoscopy, redo uniportal right video-assisted thoracic surgery, pneumonolysis, wedge resection of right upper lobe x1 and right lower lobe x2, and mediastinal lymph node dissection on 6/17/22. Path of RUL wedge resection revealed adenocarcinoma, acinar predominant, 0.8 x 0.8 x 0.5 cm, G2, pT1a N0. RLL wedge resection revealed nonnecrotizing granulomas and focal necrotizing. Additional Findings: Emphysema. Metallic coil identified in part # 1 Special stains: AFB stain is negative for acid fast bacilli. GMS stain is negative for fungal organisms.  Patient was evaluated by Dr. Kirkland on 07/13/2022, there is no role for systemic adjuvant tx.  Here today with follow up imaging.   I have independently reviewed the medical records and imaging at the time of this office consultation, and discussed the following interpretations with the patient: - CT Chest reviewed, stable findings. Discussed returning to clinic in 3 months with repeat non contrast CT Chest to re-evaluate stability. She is agreeable.   Recommendations reviewed with patient during this office visit, and all questions answered; Patient instructed on the importance of follow up and verbalizes understanding.  I, Dr. [facility], personally performed the evaluation and management (E/M) services for this established patient. That E/M includes assessing all new/exacerbated conditions, and establishing a new plan of care. Today, My ACP, Deisi Munguia, was here to observe my evaluation and management services for this patient to be followed going forward.

## 2024-02-20 ENCOUNTER — APPOINTMENT (OUTPATIENT)
Dept: PULMONOLOGY | Facility: CLINIC | Age: 79
End: 2024-02-20
Payer: MEDICARE

## 2024-02-20 VITALS — DIASTOLIC BLOOD PRESSURE: 67 MMHG | HEART RATE: 69 BPM | OXYGEN SATURATION: 100 % | SYSTOLIC BLOOD PRESSURE: 110 MMHG

## 2024-02-20 DIAGNOSIS — J45.909 UNSPECIFIED ASTHMA, UNCOMPLICATED: ICD-10-CM

## 2024-02-20 DIAGNOSIS — U07.1 COVID-19: ICD-10-CM

## 2024-02-20 LAB — POCT - HEMOGLOBIN (HGB), QUANTITATIVE, TRANSCUTANEOUS: 11.5

## 2024-02-20 PROCEDURE — 88738 HGB QUANT TRANSCUTANEOUS: CPT

## 2024-02-20 PROCEDURE — 94727 GAS DIL/WSHOT DETER LNG VOL: CPT

## 2024-02-20 PROCEDURE — 94010 BREATHING CAPACITY TEST: CPT

## 2024-02-20 PROCEDURE — ZZZZZ: CPT

## 2024-02-20 PROCEDURE — 99213 OFFICE O/P EST LOW 20 MIN: CPT | Mod: 25

## 2024-02-20 PROCEDURE — 95012 NITRIC OXIDE EXP GAS DETER: CPT

## 2024-02-20 PROCEDURE — 94729 DIFFUSING CAPACITY: CPT

## 2024-02-20 RX ORDER — NIRMATRELVIR AND RITONAVIR 300-100 MG
20 X 150 MG & KIT ORAL
Qty: 1 | Refills: 0 | Status: COMPLETED | COMMUNITY
Start: 2023-12-25 | End: 2024-02-20

## 2024-02-20 RX ORDER — SUCRALFATE 1 G/1
1 TABLET ORAL
Qty: 90 | Refills: 1 | Status: COMPLETED | COMMUNITY
Start: 2020-11-23 | End: 2024-02-20

## 2024-02-20 RX ORDER — OFLOXACIN OTIC 3 MG/ML
0.3 SOLUTION AURICULAR (OTIC) TWICE DAILY
Qty: 1 | Refills: 0 | Status: COMPLETED | COMMUNITY
Start: 2023-06-09 | End: 2024-02-20

## 2024-02-20 RX ORDER — MUPIROCIN 20 MG/G
2 OINTMENT TOPICAL TWICE DAILY
Qty: 1 | Refills: 1 | Status: COMPLETED | COMMUNITY
Start: 2023-06-09 | End: 2024-02-20

## 2024-02-20 RX ORDER — TRIAMCINOLONE ACETONIDE 1 MG/G
0.1 CREAM TOPICAL
Qty: 80 | Refills: 0 | Status: COMPLETED | COMMUNITY
Start: 2020-08-05 | End: 2024-02-20

## 2024-02-20 RX ORDER — DIPHENHYDRAMINE HYDROCHLORIDE AND LIDOCAINE HYDROCHLORIDE AND ALUMINUM HYDROXIDE AND MAGNESIUM HYDRO
KIT EVERY 8 HOURS
Qty: 1 | Refills: 3 | Status: COMPLETED | COMMUNITY
Start: 2020-11-23 | End: 2024-02-20

## 2024-02-20 RX ORDER — AMLODIPINE BESYLATE 5 MG/1
5 TABLET ORAL
Qty: 7 | Refills: 3 | Status: COMPLETED | COMMUNITY
Start: 2023-12-25 | End: 2024-02-20

## 2024-02-20 RX ORDER — AMLODIPINE BESYLATE AND BENAZEPRIL HYDROCHLORIDE 5; 10 MG/1; MG/1
5-10 CAPSULE ORAL
Refills: 0 | Status: COMPLETED | COMMUNITY
End: 2024-02-20

## 2024-02-20 NOTE — HISTORY OF PRESENT ILLNESS
[TextBox_4] : F/u asthma and pulmonary nodules   Overall feeling well; no respiratory complaints reported at this time   Had CT scan on Feb. 6th, 2024; had stopped taking Advair when she had COVID and was only using Albuterol, and is currently now started taking Advair again about one month ago

## 2024-02-20 NOTE — ASSESSMENT
[FreeTextEntry1] : Pulmonary status stable.  Resume Advair once daily pending interval follow-up.  Follow-up chest CT scheduled for June patient's to see me afterwards

## 2024-03-03 ENCOUNTER — NON-APPOINTMENT (OUTPATIENT)
Age: 79
End: 2024-03-03

## 2024-03-12 RX ORDER — FLUTICASONE PROPIONATE AND SALMETEROL XINAFOATE 115; 21 UG/1; UG/1
115-21 AEROSOL, METERED RESPIRATORY (INHALATION)
Qty: 1 | Refills: 5 | Status: ACTIVE | COMMUNITY
Start: 2022-11-01 | End: 1900-01-01

## 2024-04-10 ENCOUNTER — APPOINTMENT (OUTPATIENT)
Dept: ORTHOPEDIC SURGERY | Facility: CLINIC | Age: 79
End: 2024-04-10
Payer: MEDICARE

## 2024-04-10 VITALS — HEIGHT: 58 IN | WEIGHT: 120 LBS | BODY MASS INDEX: 25.19 KG/M2

## 2024-04-10 PROCEDURE — G2211 COMPLEX E/M VISIT ADD ON: CPT

## 2024-04-10 PROCEDURE — 73564 X-RAY EXAM KNEE 4 OR MORE: CPT | Mod: 50

## 2024-04-10 PROCEDURE — 99213 OFFICE O/P EST LOW 20 MIN: CPT

## 2024-04-10 NOTE — PHYSICAL EXAM
[de-identified] : General appearance: well-nourished and hydrated, pleasant, alert and oriented x 3, cooperative. HEENT: Normocephalic, EOM intact, Nasal septum midline, Oral cavity clear, External auditory canal clear. Cardiovascular: no apparent abnormalities, no lower leg edema, no varicosities, pedal pulses are palpable. Lymphatics Lymph nodes: none palpated, Lymphedema: not present. Neurologic: sensation is normal, no muscle weakness in upper or lower extremities, patella tendon reflexes intact. Dermatologic no apparent skin lesions, moist, warm, no rash. Spine: cervical spine appears normal and moves freely, thoracic spine appears normal and moves freely, lumbosacral spine appears normal and moves freely. Gait: nonantalgic.   Left Knee Inspection: trace effusion. Wounds: none.  Alignment: normal. Palpation: no specific tenderness on palpation. ROM: Active (in degrees): 5-100 with crepitus and discomfort Ligamentous laxity (neg): all ligaments appear stable, negative ant. drawer test, negative post. drawer test, stable to varus stress test, stable to valgus stress test, negative Lachman's test, negative pivot shift test, Meniscal Test: negative McMurrays, negative Rasheed. Patellofemoral Alignment Test: Q angle-, normal. Muscle Test: good quad strength. Leg examination: calf is soft and non-tender.   Right Knee Inspection: mild effusion  Wounds: none.  Alignment: normal. Palpation: no specific tenderness on palpation. ROM: Active (in degrees): 5-100 with crepitus and discomfort Ligamentous laxity (neg): all ligaments appear stable, negative ant. drawer test, negative post. drawer test, stable to varus stress test, stable to valgus stress test, negative Lachman's test, negative pivot shift test, Meniscal Test: negative McMurrays, negative Rasheed. Patellofemoral Alignment Test: Q angle-, normal. Muscle Test: good quad strength. Leg examination: calf is soft and non-tender. [de-identified] : Right knee x-rays, standing AP/Lateral and Merchant films, and 45-degree PA standing view, taken at the office today shows diffuse tricompartmental degenerative arthritis, medial joint space narrowing, marginal osteophytes, bone on bone sclerosis, patellofemoral joint space narrowing, peripheral osteophytes, Kellgren and Emile grade 3 with severe patellofemoral arthritis.   Left knee x-rays, standing AP/Lateral and Merchant films, and 45-degree PA standing view, taken at the office today shows diffuse tricompartmental degenerative arthritis, medial joint space narrowing, marginal osteophytes, bone on bone sclerosis, patellofemoral joint space narrowing, peripheral osteophytes, Kellgren and Emile grade 3 with severe patellofemoral arthritis.

## 2024-04-10 NOTE — HISTORY OF PRESENT ILLNESS
[de-identified] : HOSEA GRAY  78 year old female presents for evaluation of B/L knee arthritis. She has pain with stairs up> down but does not use any asistive devices. She has a hsotry of gel injections with her last injection lasting for 6 months which were 50% helpful. She would like to continue viscosupplementation. Patient has no other complaints and does not take any pain medications.

## 2024-04-10 NOTE — DISCUSSION/SUMMARY
[de-identified] : Discussed at length the nature of the patient's condition. Their Bilateral knee symptoms appear secondary to degenerative arthritis. We reviewed operative and nonoperative treatment. While I believe she would eventually benefit from a bilateral TKR, she wants to avoid surgery at this time. Therefore, we will continue nonoperatively. We will seek authorization for Bilateral knee viscosupplementation injections. Once we receive authorization, we will proceed accordingly. I also suggested home exercise, weight management, and Tylenol  prn.

## 2024-04-10 NOTE — CONSULT LETTER
[Dear  ___] : Dear  [unfilled], [Consult Letter:] : I had the pleasure of evaluating your patient, [unfilled]. [Please see my note below.] : Please see my note below. [Consult Closing:] : Thank you very much for allowing me to participate in the care of this patient.  If you have any questions, please do not hesitate to contact me. [Sincerely,] : Sincerely, [FreeTextEntry3] : Dr. Bolivar Caraballo

## 2024-04-10 NOTE — ADDENDUM
[FreeTextEntry1] : This note was written by Miguel Ángel Roche on 04/10/2024 acting as scribe for Dr. Bolivar Caraballo M.D.   I, Dr. Bolivar Caraballo, have read and attest that all the information, medical decision making and discharge instructions within are true and accurate.

## 2024-05-01 ENCOUNTER — APPOINTMENT (OUTPATIENT)
Dept: ORTHOPEDIC SURGERY | Facility: CLINIC | Age: 79
End: 2024-05-01
Payer: MEDICARE

## 2024-05-01 PROCEDURE — 20610 DRAIN/INJ JOINT/BURSA W/O US: CPT | Mod: 50

## 2024-05-01 NOTE — PROCEDURE
[de-identified] : Euflexxa # 1 Bilateral knee Discussed at length with the patient the planned Euflexxa injection. The risks, benefits, convalescence and alternatives were reviewed. The possible side effects discussed included but were not limited to: pain, swelling, heat and redness. There symptoms are generally mild but if they are extensive then contact the office. Giving pain relievers by mouth such as NSAIDs or Tylenol can generally treat the reactions to Euflexxa. Rare cases of infection have been noted. Rash, hives and itching may occur post injection. If you have muscle pain or cramps, flushing and or swelling of the face, rapid heart beat, nausea, dizziness, fever, chills, headache, difficulty breathing, swelling in the arms or legs, or have a prickly feeling of your skin, contact a health care provider immediately.  Following this discussion, the knee was prepped with betadine and under sterile condition the Euflexxa injection was performed with a 22 gauge needle. The needle was introduced into the joint, aspiration was performed to ensure intra-articular placement and the medication was injected. Upon withdrawal of the needle the site was cleaned with alcohol and a bandaid applied. The patient tolerated the injection well and there were no adverse effects. Post injection instructions included no strenuous activity for 24 hours, cryotherapy and if there are any adverse effects to contact the office.

## 2024-05-08 ENCOUNTER — APPOINTMENT (OUTPATIENT)
Dept: ORTHOPEDIC SURGERY | Facility: CLINIC | Age: 79
End: 2024-05-08
Payer: MEDICARE

## 2024-05-08 PROCEDURE — 20610 DRAIN/INJ JOINT/BURSA W/O US: CPT | Mod: 50

## 2024-05-08 NOTE — PROCEDURE
[de-identified] : Euflexxa # 2 Bilateral knee Discussed at length with the patient the planned Euflexxa injection. The risks, benefits, convalescence and alternatives were reviewed. The possible side effects discussed included but were not limited to: pain, swelling, heat and redness. There symptoms are generally mild but if they are extensive then contact the office. Giving pain relievers by mouth such as NSAIDs or Tylenol can generally treat the reactions to Euflexxa. Rare cases of infection have been noted. Rash, hives and itching may occur post injection. If you have muscle pain or cramps, flushing and or swelling of the face, rapid heart beat, nausea, dizziness, fever, chills, headache, difficulty breathing, swelling in the arms or legs, or have a prickly feeling of your skin, contact a health care provider immediately.  Following this discussion, the knee was prepped with betadine and under sterile condition the Euflexxa injection was performed with a 22 gauge needle. The needle was introduced into the joint, aspiration was performed to ensure intra-articular placement and the medication was injected. Upon withdrawal of the needle the site was cleaned with alcohol and a bandaid applied. The patient tolerated the injection well and there were no adverse effects. Post injection instructions included no strenuous activity for 24 hours, cryotherapy and if there are any adverse effects to contact the office.

## 2024-05-15 ENCOUNTER — APPOINTMENT (OUTPATIENT)
Dept: ORTHOPEDIC SURGERY | Facility: CLINIC | Age: 79
End: 2024-05-15
Payer: MEDICARE

## 2024-05-15 DIAGNOSIS — M17.0 BILATERAL PRIMARY OSTEOARTHRITIS OF KNEE: ICD-10-CM

## 2024-05-15 PROCEDURE — 20610 DRAIN/INJ JOINT/BURSA W/O US: CPT | Mod: 50

## 2024-05-17 NOTE — PROCEDURE
[de-identified] : Euflexxa # 3 Bilateral knee Discussed at length with the patient the planned Euflexxa injection. The risks, benefits, convalescence and alternatives were reviewed. The possible side effects discussed included but were not limited to: pain, swelling, heat and redness. There symptoms are generally mild but if they are extensive then contact the office. Giving pain relievers by mouth such as NSAIDs or Tylenol can generally treat the reactions to Euflexxa. Rare cases of infection have been noted. Rash, hives and itching may occur post injection. If you have muscle pain or cramps, flushing and or swelling of the face, rapid heart beat, nausea, dizziness, fever, chills, headache, difficulty breathing, swelling in the arms or legs, or have a prickly feeling of your skin, contact a health care provider immediately.  Following this discussion, the knee was prepped with betadine and under sterile condition the Euflexxa injection was performed with a 22 gauge needle. The needle was introduced into the joint, aspiration was performed to ensure intra-articular placement and the medication was injected. Upon withdrawal of the needle the site was cleaned with alcohol and a bandaid applied. The patient tolerated the injection well and there were no adverse effects. Post injection instructions included no strenuous activity for 24 hours, cryotherapy and if there are any adverse effects to contact the office.

## 2024-05-31 ENCOUNTER — NON-APPOINTMENT (OUTPATIENT)
Age: 79
End: 2024-05-31

## 2024-06-04 ENCOUNTER — OUTPATIENT (OUTPATIENT)
Dept: OUTPATIENT SERVICES | Facility: HOSPITAL | Age: 79
LOS: 1 days | End: 2024-06-04
Payer: MEDICARE

## 2024-06-04 ENCOUNTER — APPOINTMENT (OUTPATIENT)
Dept: CT IMAGING | Facility: IMAGING CENTER | Age: 79
End: 2024-06-04
Payer: MEDICARE

## 2024-06-04 DIAGNOSIS — Z90.722 ACQUIRED ABSENCE OF OVARIES, BILATERAL: Chronic | ICD-10-CM

## 2024-06-04 DIAGNOSIS — Z98.890 OTHER SPECIFIED POSTPROCEDURAL STATES: Chronic | ICD-10-CM

## 2024-06-04 DIAGNOSIS — C34.91 MALIGNANT NEOPLASM OF UNSPECIFIED PART OF RIGHT BRONCHUS OR LUNG: ICD-10-CM

## 2024-06-04 PROCEDURE — 71250 CT THORAX DX C-: CPT | Mod: 26,MH

## 2024-06-04 PROCEDURE — 71250 CT THORAX DX C-: CPT

## 2024-06-11 ENCOUNTER — APPOINTMENT (OUTPATIENT)
Dept: THORACIC SURGERY | Facility: CLINIC | Age: 79
End: 2024-06-11
Payer: MEDICARE

## 2024-06-11 VITALS
HEIGHT: 58 IN | OXYGEN SATURATION: 97 % | SYSTOLIC BLOOD PRESSURE: 127 MMHG | HEART RATE: 78 BPM | RESPIRATION RATE: 17 BRPM | BODY MASS INDEX: 24.77 KG/M2 | WEIGHT: 118 LBS | DIASTOLIC BLOOD PRESSURE: 62 MMHG

## 2024-06-11 DIAGNOSIS — C34.91 MALIGNANT NEOPLASM OF UNSPECIFIED PART OF RIGHT BRONCHUS OR LUNG: ICD-10-CM

## 2024-06-11 DIAGNOSIS — R91.8 OTHER NONSPECIFIC ABNORMAL FINDING OF LUNG FIELD: ICD-10-CM

## 2024-06-11 DIAGNOSIS — R91.1 SOLITARY PULMONARY NODULE: ICD-10-CM

## 2024-06-11 PROCEDURE — 99215 OFFICE O/P EST HI 40 MIN: CPT

## 2024-06-11 NOTE — ASSESSMENT
[FreeTextEntry1] : Ms. HOSEA GRAY, 78 year old female, former smoker (3-4PPDx 15 years; Quit in 1980's), w/ hx of History of Johnson's esophagus; lung cancer, status post RUL resection and RLL superior segmentectomy 2/25/2014 at [facility] (Stage I lung cancer?), crest syndrome, Has been undergoing active surveillance. Referred in March, 2022 for RUL nodule  Now, s/p Flexible bronchoscopy, redo uniportal right video-assisted thoracic surgery, pneumonolysis, wedge resection of right upper lobe x1 and right lower lobe x2, and mediastinal lymph node dissection on 6/17/22. Path of RUL wedge resection revealed adenocarcinoma, acinar predominant, 0.8 x 0.8 x 0.5 cm, G2, pT1a N0. RLL wedge resection revealed nonnecrotizing granulomas and focal necrotizing. Additional Findings: Emphysema. Metallic coil identified in part # 1 Special stains: AFB stain is negative for acid fast bacilli. GMS stain is negative for fungal organisms.  Patient was evaluated by Dr. Kirkland on 07/13/2022, there is no role for systemic adjuvant tx.  Here today with follow up imaging.   I have independently reviewed the medical records and imaging at the time of this office consultation, and discussed the following interpretations with the patient: - CT Chest reviewed and compared back to 2021, LLL nodule increased in size, suspicious for malignancy. Surgical approach reviewed with patient. Discussed risks in details, patient is agreeable to proceed if needed. Will schedule for FB, Left VATS, lung resection. - She will need cardiac clearance prior to procedure. - Confirmed with patient she is not on any anticoagulation therapy.   Recommendations reviewed with patient during this office visit, and all questions answered; Patient instructed on the importance of follow up and verbalizes understanding.   I, BREANNE Ingram, personally performed the evaluation and management (E/M) services for this established patient. That E/M includes conducting the examination, assessing all new/exacerbated conditions, and establishing a new plan of care. Today, my ACP, Griffin Quigley NP, was here to observe my evaluation and management services for this new problem/exacerbated condition to be followed going forward.

## 2024-06-11 NOTE — HISTORY OF PRESENT ILLNESS
[FreeTextEntry1] : Ms. HOSEA GRAY, 78 year old female, former smoker (3-4PPDx 15 years; Quit in 1980's), w/ hx of History of Johnson's esophagus; lung cancer, status post RUL resection and RLL superior segmentectomy 2/25/2014 at [facility] (Stage I lung cancer?), crest syndrome, Has been undergoing active surveillance. Referred in March, 2022 for RUL nodule  PFTs on 3/8/22: FVC: 1.78 (84%); FEV1: 1.25 (78%); DLCO: 10.42 (59%)  Patient was given a course of ABX on 04/05/2022 for 7 days.  Now, s/p Flexible bronchoscopy, redo uniportal right video-assisted thoracic surgery, pneumonolysis, wedge resection of right upper lobe x1 and right lower lobe x2, and mediastinal lymph node dissection on 6/17/22. Path of RUL wedge resection revealed adenocarcinoma, acinar predominant, 0.8 x 0.8 x 0.5 cm, G2, pT1a N0. RLL wedge resection revealed nonnecrotizing granulomas and focal necrotizing. Additional Findings: Emphysema. Metallic coil identified in part # 1 Special stains: AFB stain is negative for acid fast bacilli. GMS stain is negative for fungal organisms.  Postoperative course was complicated by small air leak that eventually resolved. and she was discharged home on 6/21/22 with a 10-day course of antibiotic for suspected RLL PNA. Contracted Hives on Day 5 of Augmentin. Contacted service line and was advised to d/c.  Patient was seen on 06/28/2022. CXR showed Moderate right pleural effusion, recommended to RTC in 1 week with repeat CT Chest to further evaluate as well as to discuss final path.  CT chest on 06/28/2022: - Stable post op findings.  Patient was evaluated by Dr. Kirkland on 07/13/2022, there is no role for systemic adjuvant tx.  CT chest on 09/28/2022: - Status post right upper and lower lobe wedge resections. Decreased, now trace right pleural effusion with resolved pleural air component. - Stable 1.8 cm hypodense tubular right lower lobe nodule, possibly chronic bronchial impaction.  CT chest on 1/24/23: - Post op changes - 1.8 cm nodular opacity in RLL; Unchanged in its size and appearance when compared to previous exam. - Few cluster of tiny nodules in the LLL likely representing mucoid impacted distal small airways are also unchanged. - Below the diaphragm, visualized portions of the abdomen demonstrate left adrenal gland myelolipoma.  CT Chest on 4/25/23: - Stable postoperative changes following right upper and lower lobe wedge resections. - Unchanged 1.8 cm nodular opacity in the RLL (series 2, image 46). Stable clustered tree-in-bud nodularity in the posterior LLL  CT Chest on 08/01/2023: - Status post right upper and lower lobe wedge resections. - Stable 1.7 cm right lower lobe tubular density on series 2, image 50, as well as a small cluster of nodules in the left lower lobe on images 81-83 likely due to small airways disease no new nodules. -Unchanged mild right pleural thickening.  CT on 11/7/2023:  - Unchanged scarring in the right lung from multiple wedge resections - Unchanged 1.7 cm RLL tubular nodule (2-48) - Unchanged small cluster of nodules in the LLL, likely mucus impacted bronchioles (2-79)  CT Chest on 2/6/2024: - Multiple right lung sublobar resections.  - Unchanged circumscribed tubular opacity in the right lower lobe, likely a mucous impacted airway (smaller compared to 2/2/2021).  - Unchanged small cluster of nodules in the left lower lobe, also likely due to mucus impaction.  PFTs on 2/20/24: FVC: 1.7 (87%); FEV1: 1.3 (88%); DLCO: 9.46 (55%)  CT Chest on 6/4/2024:  - 1.2 cm groundglass opacity in superior segment of left lower lobe with interval increase in density. The increased density can be related to an adjacent impacted distal airway.  - Postsurgical changes related to right lung wedge resections.  - Stable 1.9 cm tubular opacity in right lower lobe likely mucoid impacted airway   Patient presents to office for follow up. Overall, she reports to be feeling well. Denies any chest pain, shortness of breath, cough, or hemoptysis.

## 2024-06-13 ENCOUNTER — APPOINTMENT (OUTPATIENT)
Dept: INTERVENTIONAL RADIOLOGY/VASCULAR | Facility: CLINIC | Age: 79
End: 2024-06-13

## 2024-06-13 VITALS — WEIGHT: 117 LBS | BODY MASS INDEX: 23.59 KG/M2 | HEIGHT: 59 IN

## 2024-06-13 DIAGNOSIS — R91.8 OTHER NONSPECIFIC ABNORMAL FINDING OF LUNG FIELD: ICD-10-CM

## 2024-06-13 PROCEDURE — XXXXX: CPT

## 2024-06-13 NOTE — REASON FOR VISIT
[Consultation] : a consultation visit [Home] : at home, [unfilled] , at the time of the visit. [Medical Office: ([facility])___] : at the medical office located in  [Patient] : the patient [Self] : self [Family Member] : family member [FreeTextEntry1] : Lung marking

## 2024-06-13 NOTE — PHYSICAL EXAM
[Alert] : alert [Well Nourished] : well nourished [Oriented x3] : oriented to person, place, and time [Normal Affect] : the affect was normal

## 2024-06-13 NOTE — HISTORY OF PRESENT ILLNESS
[FreeTextEntry1] : Patient is a 78 year old female with a past medical history of former smoker (3-4PPDx 15 years; Quit in 1980's), negro's esophagus; lung cancer, status post RUL resection and RLL superior segmentectomy 2/25/2014 at [facility], crest syndrome,  s/p Flexible bronchoscopy, redo uniportal right video-assisted thoracic surgery, pneumonolysis, wedge resection of right upper lobe x1 and right lower lobe x2, and mediastinal lymph node dissection on 6/17/22 and it was decided by [facility] there was no role for systemic therapy. She has now been referred to IR by Dr. Bravo for consultation regarding lung marking procedure prior to Dr. Bravo's Left VATS, lung resection.   Patient denies recent fever, chills, shortness of breath, chest pain, nausea, vomiting or diarrhea.   CT chest 6/4/2 "1.2 cm groundglass opacity in superior segment of left lower lobe with interval increase in density. The increased density can be related to an adjacent impacted distal airway. However recurrence of malignancy cannot be excluded and short-term follow-up in 2-3 months is recommended. Postsurgical changes related to right lung wedge resections. Stable 1.9 cm tubular opacity in right lower lobe likely mucoid impacted airway"

## 2024-06-13 NOTE — ASSESSMENT
[FreeTextEntry1] : 78 year old female with history of lung cancer status post multiple thoracic surgeries found to have a ground glass nodule in the superior segment of the left lower lobe that is increasing in density, concerning for malignancy. She is referred from Dr. Bravo for needle localization prior to surgical resection o.   Imaging and chart reviewed.  Patient is appropriate candidate for preoperative needle localization of left lower lobe nodule. Will plan for procedure with sedation.  Preprocedure instructions given.   Discussed procedure details in length. Informed consent obtained. All questions answered.  Modality: CT Position: Prone Anesthesia: Sedation Imaging: CT 6/4/2024 series 2 image 24 - superior segment of the left lower lobe.

## 2024-06-14 ENCOUNTER — OUTPATIENT (OUTPATIENT)
Dept: OUTPATIENT SERVICES | Facility: HOSPITAL | Age: 79
LOS: 1 days | End: 2024-06-14

## 2024-06-14 VITALS
HEART RATE: 73 BPM | TEMPERATURE: 98 F | HEIGHT: 56.5 IN | RESPIRATION RATE: 18 BRPM | SYSTOLIC BLOOD PRESSURE: 113 MMHG | WEIGHT: 117.95 LBS | DIASTOLIC BLOOD PRESSURE: 66 MMHG | OXYGEN SATURATION: 99 %

## 2024-06-14 DIAGNOSIS — I10 ESSENTIAL (PRIMARY) HYPERTENSION: ICD-10-CM

## 2024-06-14 DIAGNOSIS — Z98.890 OTHER SPECIFIED POSTPROCEDURAL STATES: Chronic | ICD-10-CM

## 2024-06-14 DIAGNOSIS — Z90.722 ACQUIRED ABSENCE OF OVARIES, BILATERAL: Chronic | ICD-10-CM

## 2024-06-14 DIAGNOSIS — R91.1 SOLITARY PULMONARY NODULE: ICD-10-CM

## 2024-06-14 LAB
ANION GAP SERPL CALC-SCNC: 13 MMOL/L — SIGNIFICANT CHANGE UP (ref 7–14)
BLD GP AB SCN SERPL QL: NEGATIVE — SIGNIFICANT CHANGE UP
BUN SERPL-MCNC: 22 MG/DL — SIGNIFICANT CHANGE UP (ref 7–23)
CALCIUM SERPL-MCNC: 9 MG/DL — SIGNIFICANT CHANGE UP (ref 8.4–10.5)
CHLORIDE SERPL-SCNC: 102 MMOL/L — SIGNIFICANT CHANGE UP (ref 98–107)
CO2 SERPL-SCNC: 23 MMOL/L — SIGNIFICANT CHANGE UP (ref 22–31)
CREAT SERPL-MCNC: 0.75 MG/DL — SIGNIFICANT CHANGE UP (ref 0.5–1.3)
EGFR: 81 ML/MIN/1.73M2 — SIGNIFICANT CHANGE UP
GLUCOSE SERPL-MCNC: 83 MG/DL — SIGNIFICANT CHANGE UP (ref 70–99)
HCT VFR BLD CALC: 34.1 % — LOW (ref 34.5–45)
HGB BLD-MCNC: 12.2 G/DL — SIGNIFICANT CHANGE UP (ref 11.5–15.5)
MCHC RBC-ENTMCNC: 31.9 PG — SIGNIFICANT CHANGE UP (ref 27–34)
MCHC RBC-ENTMCNC: 35.8 GM/DL — SIGNIFICANT CHANGE UP (ref 32–36)
MCV RBC AUTO: 89 FL — SIGNIFICANT CHANGE UP (ref 80–100)
NRBC # BLD: 0 /100 WBCS — SIGNIFICANT CHANGE UP (ref 0–0)
NRBC # FLD: 0 K/UL — SIGNIFICANT CHANGE UP (ref 0–0)
PLATELET # BLD AUTO: 220 K/UL — SIGNIFICANT CHANGE UP (ref 150–400)
POTASSIUM SERPL-MCNC: 4.2 MMOL/L — SIGNIFICANT CHANGE UP (ref 3.5–5.3)
POTASSIUM SERPL-SCNC: 4.2 MMOL/L — SIGNIFICANT CHANGE UP (ref 3.5–5.3)
RBC # BLD: 3.83 M/UL — SIGNIFICANT CHANGE UP (ref 3.8–5.2)
RBC # FLD: 15.1 % — HIGH (ref 10.3–14.5)
RH IG SCN BLD-IMP: POSITIVE — SIGNIFICANT CHANGE UP
SODIUM SERPL-SCNC: 138 MMOL/L — SIGNIFICANT CHANGE UP (ref 135–145)
WBC # BLD: 7.91 K/UL — SIGNIFICANT CHANGE UP (ref 3.8–10.5)
WBC # FLD AUTO: 7.91 K/UL — SIGNIFICANT CHANGE UP (ref 3.8–10.5)

## 2024-06-14 RX ORDER — AMLODIPINE BESYLATE AND BENAZEPRIL HYDROCHLORIDE 10; 20 MG/1; MG/1
1 CAPSULE ORAL
Qty: 0 | Refills: 0 | DISCHARGE

## 2024-06-14 RX ORDER — PREGABALIN 225 MG/1
1 CAPSULE ORAL
Qty: 0 | Refills: 0 | DISCHARGE

## 2024-06-14 RX ORDER — CHOLECALCIFEROL (VITAMIN D3) 125 MCG
0 CAPSULE ORAL
Qty: 0 | Refills: 0 | DISCHARGE

## 2024-06-14 RX ORDER — DEXTROSE MONOHYDRATE AND SODIUM CHLORIDE 5; .3 G/100ML; G/100ML
1000 INJECTION, SOLUTION INTRAVENOUS
Refills: 0 | Status: DISCONTINUED | OUTPATIENT
Start: 2024-06-19 | End: 2024-06-20

## 2024-06-14 RX ORDER — FAMOTIDINE 10 MG/ML
1 INJECTION INTRAVENOUS
Qty: 0 | Refills: 0 | DISCHARGE

## 2024-06-14 NOTE — H&P PST ADULT - PROBLEM SELECTOR PLAN 1
Scheduled for Flexible Bronchoscopy, Left Video Assisted Thorascopic Surgery, Lung Resection with IR Marking of Left Loser Lobe Nodule on 06/19/24.  Preop instructions provided and patient verbalizes understanding.  Labs done and results pending.  Patient instructed to take own Omeprazole. Hibiclens provided with instructions and was signed by patient. Teach-back method was utilized to assess patient's understanding. Patient verbalized understanding.  Patient instructed to take Advair on the morning of procedure.

## 2024-06-14 NOTE — H&P PST ADULT - HISTORY OF PRESENT ILLNESS
78 year old former smoker , Crest Syndrome, negro esophagus, HTN, HLD,  lung cancer stage 1 s/p RUL resection in 2014 ([facility]) patient  had abnormal routine CT of the chest now presents to PST with pre op dx solitary pulmonary nodule. Patient is scheduled for flexible bronchoscopy , Left video assisted thoracoscopy, Lung Resection with IR markings of Left Lower Lobe Nodule tentatively on 06/19/24.  Of note patient had right  thoracotomy with epidural, right upper possible lower lobe wedge resection with interventional radiology marking on 6/15/22.   78 year old with medical hx of former smoker , Crest Syndrome, negro esophagus, osteoporosis,  HTN, HLD,  lung cancer stage 1 s/p RUL resection in 2014 ([facility]) patient  had abnormal routine CT of the chest now presents to PST with pre op dx solitary pulmonary nodule. Patient is scheduled for flexible bronchoscopy , Left video assisted thoracoscopy, Lung Resection with IR markings of Left Lower Lobe Nodule tentatively on 06/19/24.  Of note patient had right  thoracotomy with epidural, right upper possible lower lobe wedge resection with interventional radiology marking on 6/15/22.

## 2024-06-14 NOTE — H&P PST ADULT - NSICDXPASTMEDICALHX_GEN_ALL_CORE_FT
PAST MEDICAL HISTORY:  Acid Reflux     Johnson's esophagus     CREST syndrome     Dyslipidemia     HTN (Hypertension)     Lumbar Spinal Stenosis     Lung cancer     Lung nodules     Obesity     Ovarian Cyst      PAST MEDICAL HISTORY:  Acid Reflux     Johnson's esophagus     CREST syndrome     Dyslipidemia     [place] (hard of hearing)     HTN (Hypertension)     Lumbar Spinal Stenosis     Lung cancer     Lung nodules     Obesity     Ovarian Cyst

## 2024-06-14 NOTE — H&P PST ADULT - MUSCULOSKELETAL
details… ROM intact/no joint swelling/no joint erythema ROM intact/no joint swelling/strength 5/5 bilateral upper extremities/strength 5/5 bilateral lower extremities

## 2024-06-14 NOTE — H&P PST ADULT - NSICDXPASTSURGICALHX_GEN_ALL_CORE_FT
PAST SURGICAL HISTORY:  Benign Cyst of Breast left    H/O bilateral oophorectomy     History of lung surgery RUL resection in [facility] 2014    S/P Arthroscopy of Right Knee

## 2024-06-18 ENCOUNTER — TRANSCRIPTION ENCOUNTER (OUTPATIENT)
Age: 79
End: 2024-06-18

## 2024-06-18 NOTE — ASU PATIENT PROFILE, ADULT - FALL HARM RISK - UNIVERSAL INTERVENTIONS
Bed in lowest position, wheels locked, appropriate side rails in place/Call bell, personal items and telephone in reach/Instruct patient to call for assistance before getting out of bed or chair/Non-slip footwear when patient is out of bed/[place] to call system/Physically safe environment - no spills, clutter or unnecessary equipment/Purposeful Proactive Rounding/Room/bathroom lighting operational, light cord in reach

## 2024-06-18 NOTE — ASU PATIENT PROFILE, ADULT - NSICDXPASTMEDICALHX_GEN_ALL_CORE_FT
PAST MEDICAL HISTORY:  Acid Reflux     Johnson's esophagus     CREST syndrome     Dyslipidemia     [place] (hard of hearing)     HTN (Hypertension)     Lumbar Spinal Stenosis     Lung cancer     Lung nodules     Obesity     Ovarian Cyst

## 2024-06-19 ENCOUNTER — TRANSCRIPTION ENCOUNTER (OUTPATIENT)
Age: 79
End: 2024-06-19

## 2024-06-19 ENCOUNTER — APPOINTMENT (OUTPATIENT)
Dept: THORACIC SURGERY | Facility: HOSPITAL | Age: 79
End: 2024-06-19

## 2024-06-19 ENCOUNTER — INPATIENT (INPATIENT)
Facility: HOSPITAL | Age: 79
LOS: 0 days | Discharge: HOME | End: 2024-06-20
Attending: THORACIC SURGERY (CARDIOTHORACIC VASCULAR SURGERY) | Admitting: THORACIC SURGERY (CARDIOTHORACIC VASCULAR SURGERY)
Payer: MEDICARE

## 2024-06-19 ENCOUNTER — RESULT REVIEW (OUTPATIENT)
Age: 79
End: 2024-06-19

## 2024-06-19 VITALS
HEART RATE: 65 BPM | HEIGHT: 56.5 IN | TEMPERATURE: 98 F | OXYGEN SATURATION: 100 % | WEIGHT: 117.95 LBS | RESPIRATION RATE: 14 BRPM | SYSTOLIC BLOOD PRESSURE: 98 MMHG | DIASTOLIC BLOOD PRESSURE: 52 MMHG

## 2024-06-19 DIAGNOSIS — Z98.890 OTHER SPECIFIED POSTPROCEDURAL STATES: Chronic | ICD-10-CM

## 2024-06-19 DIAGNOSIS — Z90.722 ACQUIRED ABSENCE OF OVARIES, BILATERAL: Chronic | ICD-10-CM

## 2024-06-19 DIAGNOSIS — R91.1 SOLITARY PULMONARY NODULE: ICD-10-CM

## 2024-06-19 PROCEDURE — 71045 X-RAY EXAM CHEST 1 VIEW: CPT | Mod: 26

## 2024-06-19 PROCEDURE — 77012 CT SCAN FOR NEEDLE BIOPSY: CPT | Mod: 26

## 2024-06-19 PROCEDURE — 32608 THORACOSCOPY W/BX NODULE: CPT | Mod: LT

## 2024-06-19 PROCEDURE — 32674 THORACOSCOPY LYMPH NODE EXC: CPT

## 2024-06-19 PROCEDURE — 88305 TISSUE EXAM BY PATHOLOGIST: CPT | Mod: 26

## 2024-06-19 PROCEDURE — 32553 INS MARK THOR FOR RT PERQ: CPT

## 2024-06-19 PROCEDURE — 88307 TISSUE EXAM BY PATHOLOGIST: CPT | Mod: 26

## 2024-06-19 DEVICE — CHEST DRAIN THORACIC ARGYLE PVC 20FR STRAIGHT: Type: IMPLANTABLE DEVICE | Site: LEFT | Status: FUNCTIONAL

## 2024-06-19 DEVICE — STAPLER ETHICON GST ECHELON 45MM GOLD RELOAD: Type: IMPLANTABLE DEVICE | Site: LEFT | Status: FUNCTIONAL

## 2024-06-19 DEVICE — STAPLER ETHICON GST ECHELON 45MM GREEN RELOAD: Type: IMPLANTABLE DEVICE | Site: LEFT | Status: FUNCTIONAL

## 2024-06-19 RX ORDER — ATORVASTATIN CALCIUM 20 MG/1
10 TABLET, FILM COATED ORAL AT BEDTIME
Refills: 0 | Status: DISCONTINUED | OUTPATIENT
Start: 2024-06-19 | End: 2024-06-20

## 2024-06-19 RX ORDER — AMLODIPINE BESYLATE 2.5 MG/1
1 TABLET ORAL
Refills: 0 | DISCHARGE

## 2024-06-19 RX ORDER — HYDROMORPHONE HCL 0.2 MG/ML
0.5 INJECTION, SOLUTION INTRAVENOUS
Refills: 0 | Status: DISCONTINUED | OUTPATIENT
Start: 2024-06-19 | End: 2024-06-19

## 2024-06-19 RX ORDER — ATORVASTATIN CALCIUM 80 MG/1
1 TABLET, FILM COATED ORAL
Qty: 0 | Refills: 0 | DISCHARGE

## 2024-06-19 RX ORDER — POLYETHYLENE GLYCOL 3350 1 G/G
17 POWDER ORAL DAILY
Refills: 0 | Status: DISCONTINUED | OUTPATIENT
Start: 2024-06-19 | End: 2024-06-20

## 2024-06-19 RX ORDER — FAMOTIDINE 40 MG
20 TABLET ORAL DAILY
Refills: 0 | Status: DISCONTINUED | OUTPATIENT
Start: 2024-06-19 | End: 2024-06-20

## 2024-06-19 RX ORDER — SENNOSIDES 8.6 MG
2 TABLET ORAL AT BEDTIME
Refills: 0 | Status: DISCONTINUED | OUTPATIENT
Start: 2024-06-19 | End: 2024-06-20

## 2024-06-19 RX ORDER — BUDESONIDE/FORMOTEROL FUMARATE 160-4.5MCG
2 HFA AEROSOL WITH ADAPTER (GRAM) INHALATION
Refills: 0 | Status: DISCONTINUED | OUTPATIENT
Start: 2024-06-19 | End: 2024-06-20

## 2024-06-19 RX ORDER — HEPARIN SODIUM 50 [USP'U]/ML
5000 INJECTION, SOLUTION INTRAVENOUS ONCE
Refills: 0 | Status: COMPLETED | OUTPATIENT
Start: 2024-06-19 | End: 2024-06-19

## 2024-06-19 RX ORDER — ONDANSETRON HYDROCHLORIDE 2 MG/ML
4 INJECTION INTRAMUSCULAR; INTRAVENOUS EVERY 6 HOURS
Refills: 0 | Status: DISCONTINUED | OUTPATIENT
Start: 2024-06-19 | End: 2024-06-20

## 2024-06-19 RX ORDER — OXYCODONE HYDROCHLORIDE 100 MG/5ML
10 SOLUTION ORAL EVERY 6 HOURS
Refills: 0 | Status: DISCONTINUED | OUTPATIENT
Start: 2024-06-19 | End: 2024-06-20

## 2024-06-19 RX ORDER — FAMOTIDINE 10 MG/ML
1 INJECTION INTRAVENOUS
Refills: 0 | DISCHARGE

## 2024-06-19 RX ORDER — OMEPRAZOLE 10 MG/1
1 CAPSULE, DELAYED RELEASE ORAL
Qty: 0 | Refills: 0 | DISCHARGE

## 2024-06-19 RX ORDER — HEPARIN SODIUM 50 [USP'U]/ML
5000 INJECTION, SOLUTION INTRAVENOUS EVERY 8 HOURS
Refills: 0 | Status: DISCONTINUED | OUTPATIENT
Start: 2024-06-19 | End: 2024-06-20

## 2024-06-19 RX ORDER — PANTOPRAZOLE SODIUM 40 MG/10ML
40 INJECTION, POWDER, FOR SOLUTION INTRAVENOUS
Refills: 0 | Status: DISCONTINUED | OUTPATIENT
Start: 2024-06-19 | End: 2024-06-20

## 2024-06-19 RX ORDER — ACETAMINOPHEN 325 MG
975 TABLET ORAL ONCE
Refills: 0 | Status: COMPLETED | OUTPATIENT
Start: 2024-06-19 | End: 2024-06-19

## 2024-06-19 RX ORDER — OXYCODONE HYDROCHLORIDE 100 MG/5ML
5 SOLUTION ORAL EVERY 6 HOURS
Refills: 0 | Status: DISCONTINUED | OUTPATIENT
Start: 2024-06-19 | End: 2024-06-20

## 2024-06-19 RX ORDER — FLUTICASONE PROPIONATE AND SALMETEROL 50; 250 UG/1; UG/1
2 POWDER ORAL; RESPIRATORY (INHALATION)
Refills: 0 | DISCHARGE

## 2024-06-19 RX ORDER — HYDROMORPHONE HCL 0.2 MG/ML
30 INJECTION, SOLUTION INTRAVENOUS
Refills: 0 | Status: DISCONTINUED | OUTPATIENT
Start: 2024-06-19 | End: 2024-06-19

## 2024-06-19 RX ORDER — NALOXONE HYDROCHLORIDE 1 MG/ML
0.1 INJECTION PARENTERAL
Refills: 0 | Status: DISCONTINUED | OUTPATIENT
Start: 2024-06-19 | End: 2024-06-20

## 2024-06-19 RX ORDER — DENOSUMAB 60 MG/ML
1 INJECTION SUBCUTANEOUS
Refills: 0 | DISCHARGE

## 2024-06-19 RX ORDER — ACETAMINOPHEN 325 MG
1000 TABLET ORAL EVERY 6 HOURS
Refills: 0 | Status: COMPLETED | OUTPATIENT
Start: 2024-06-19 | End: 2024-06-20

## 2024-06-19 RX ADMIN — Medication 975 MILLIGRAM(S): at 11:45

## 2024-06-19 RX ADMIN — HEPARIN SODIUM 5000 UNIT(S): 50 INJECTION, SOLUTION INTRAVENOUS at 22:42

## 2024-06-19 RX ADMIN — HEPARIN SODIUM 5000 UNIT(S): 50 INJECTION, SOLUTION INTRAVENOUS at 11:42

## 2024-06-19 RX ADMIN — DEXTROSE MONOHYDRATE AND SODIUM CHLORIDE 30 MILLILITER(S): 5; .3 INJECTION, SOLUTION INTRAVENOUS at 15:51

## 2024-06-19 RX ADMIN — DEXTROSE MONOHYDRATE AND SODIUM CHLORIDE 30 MILLILITER(S): 5; .3 INJECTION, SOLUTION INTRAVENOUS at 22:42

## 2024-06-19 RX ADMIN — ATORVASTATIN CALCIUM 10 MILLIGRAM(S): 20 TABLET, FILM COATED ORAL at 22:43

## 2024-06-19 RX ADMIN — HYDROMORPHONE HCL 0.5 MILLIGRAM(S): 0.2 INJECTION, SOLUTION INTRAVENOUS at 16:06

## 2024-06-19 RX ADMIN — Medication 1000 MILLIGRAM(S): at 17:41

## 2024-06-19 RX ADMIN — Medication 975 MILLIGRAM(S): at 09:55

## 2024-06-19 RX ADMIN — HYDROMORPHONE HCL 0.5 MILLIGRAM(S): 0.2 INJECTION, SOLUTION INTRAVENOUS at 15:51

## 2024-06-19 RX ADMIN — Medication 400 MILLIGRAM(S): at 17:26

## 2024-06-20 ENCOUNTER — TRANSCRIPTION ENCOUNTER (OUTPATIENT)
Age: 79
End: 2024-06-20

## 2024-06-20 VITALS
SYSTOLIC BLOOD PRESSURE: 113 MMHG | RESPIRATION RATE: 17 BRPM | HEART RATE: 65 BPM | TEMPERATURE: 98 F | OXYGEN SATURATION: 98 % | DIASTOLIC BLOOD PRESSURE: 40 MMHG

## 2024-06-20 LAB
ANION GAP SERPL CALC-SCNC: 12 MMOL/L — SIGNIFICANT CHANGE UP (ref 7–14)
BUN SERPL-MCNC: 13 MG/DL — SIGNIFICANT CHANGE UP (ref 7–23)
CALCIUM SERPL-MCNC: 7.9 MG/DL — LOW (ref 8.4–10.5)
CHLORIDE SERPL-SCNC: 106 MMOL/L — SIGNIFICANT CHANGE UP (ref 98–107)
CO2 SERPL-SCNC: 21 MMOL/L — LOW (ref 22–31)
CREAT SERPL-MCNC: 0.62 MG/DL — SIGNIFICANT CHANGE UP (ref 0.5–1.3)
EGFR: 91 ML/MIN/1.73M2 — SIGNIFICANT CHANGE UP
GLUCOSE SERPL-MCNC: 73 MG/DL — SIGNIFICANT CHANGE UP (ref 70–99)
HCT VFR BLD CALC: 33.2 % — LOW (ref 34.5–45)
HGB BLD-MCNC: 11 G/DL — LOW (ref 11.5–15.5)
MCHC RBC-ENTMCNC: 29 PG — SIGNIFICANT CHANGE UP (ref 27–34)
MCHC RBC-ENTMCNC: 33.1 GM/DL — SIGNIFICANT CHANGE UP (ref 32–36)
MCV RBC AUTO: 87.6 FL — SIGNIFICANT CHANGE UP (ref 80–100)
NRBC # BLD: 0 /100 WBCS — SIGNIFICANT CHANGE UP (ref 0–0)
NRBC # FLD: 0 K/UL — SIGNIFICANT CHANGE UP (ref 0–0)
PLATELET # BLD AUTO: 202 K/UL — SIGNIFICANT CHANGE UP (ref 150–400)
POTASSIUM SERPL-MCNC: 3.8 MMOL/L — SIGNIFICANT CHANGE UP (ref 3.5–5.3)
POTASSIUM SERPL-SCNC: 3.8 MMOL/L — SIGNIFICANT CHANGE UP (ref 3.5–5.3)
RBC # BLD: 3.79 M/UL — LOW (ref 3.8–5.2)
RBC # FLD: 14.2 % — SIGNIFICANT CHANGE UP (ref 10.3–14.5)
SODIUM SERPL-SCNC: 139 MMOL/L — SIGNIFICANT CHANGE UP (ref 135–145)
WBC # BLD: 8.36 K/UL — SIGNIFICANT CHANGE UP (ref 3.8–10.5)
WBC # FLD AUTO: 8.36 K/UL — SIGNIFICANT CHANGE UP (ref 3.8–10.5)

## 2024-06-20 PROCEDURE — 71045 X-RAY EXAM CHEST 1 VIEW: CPT | Mod: 26

## 2024-06-20 RX ORDER — OXYCODONE HYDROCHLORIDE 100 MG/5ML
1 SOLUTION ORAL
Qty: 30 | Refills: 0
Start: 2024-06-20 | End: 2024-06-24

## 2024-06-20 RX ORDER — FUROSEMIDE 10 MG/ML
10 INJECTION, SOLUTION INTRAMUSCULAR; INTRAVENOUS ONCE
Refills: 0 | Status: COMPLETED | OUTPATIENT
Start: 2024-06-20 | End: 2024-06-20

## 2024-06-20 RX ORDER — POLYETHYLENE GLYCOL 3350 1 G/G
17 POWDER ORAL
Qty: 0 | Refills: 0 | DISCHARGE
Start: 2024-06-20

## 2024-06-20 RX ORDER — ACETAMINOPHEN 325 MG
2 TABLET ORAL
Qty: 0 | Refills: 0 | DISCHARGE

## 2024-06-20 RX ORDER — SENNOSIDES 8.6 MG
2 TABLET ORAL
Qty: 0 | Refills: 0 | DISCHARGE
Start: 2024-06-20

## 2024-06-20 RX ADMIN — HEPARIN SODIUM 5000 UNIT(S): 50 INJECTION, SOLUTION INTRAVENOUS at 06:34

## 2024-06-20 RX ADMIN — Medication 20 MILLIGRAM(S): at 11:59

## 2024-06-20 RX ADMIN — Medication 1000 MILLIGRAM(S): at 12:31

## 2024-06-20 RX ADMIN — Medication 1000 MILLIGRAM(S): at 01:31

## 2024-06-20 RX ADMIN — OXYCODONE HYDROCHLORIDE 10 MILLIGRAM(S): 100 SOLUTION ORAL at 02:50

## 2024-06-20 RX ADMIN — OXYCODONE HYDROCHLORIDE 10 MILLIGRAM(S): 100 SOLUTION ORAL at 02:16

## 2024-06-20 RX ADMIN — Medication 2 PUFF(S): at 10:10

## 2024-06-20 RX ADMIN — Medication 400 MILLIGRAM(S): at 11:57

## 2024-06-20 RX ADMIN — Medication 1000 MILLIGRAM(S): at 07:00

## 2024-06-20 RX ADMIN — DEXTROSE MONOHYDRATE AND SODIUM CHLORIDE 30 MILLILITER(S): 5; .3 INJECTION, SOLUTION INTRAVENOUS at 07:50

## 2024-06-20 RX ADMIN — Medication 400 MILLIGRAM(S): at 06:33

## 2024-06-20 RX ADMIN — PANTOPRAZOLE SODIUM 40 MILLIGRAM(S): 40 INJECTION, POWDER, FOR SOLUTION INTRAVENOUS at 06:34

## 2024-06-20 RX ADMIN — Medication 400 MILLIGRAM(S): at 00:53

## 2024-06-20 RX ADMIN — FUROSEMIDE 10 MILLIGRAM(S): 10 INJECTION, SOLUTION INTRAMUSCULAR; INTRAVENOUS at 11:57

## 2024-06-24 PROBLEM — H91.90 UNSPECIFIED HEARING LOSS, UNSPECIFIED EAR: Chronic | Status: ACTIVE | Noted: 2024-06-14

## 2024-06-24 PROBLEM — C34.90 MALIGNANT NEOPLASM OF UNSPECIFIED PART OF UNSPECIFIED BRONCHUS OR LUNG: Chronic | Status: ACTIVE | Noted: 2024-06-14

## 2024-06-24 LAB — SURGICAL PATHOLOGY STUDY: SIGNIFICANT CHANGE UP

## 2024-06-26 DIAGNOSIS — T14.8XXD OTHER INJURY OF UNSPECIFIED BODY REGION, SUBSEQUENT ENCOUNTER: ICD-10-CM

## 2024-06-26 RX ORDER — CEPHALEXIN 500 MG/1
500 TABLET ORAL
Qty: 14 | Refills: 0 | Status: DISCONTINUED | COMMUNITY
Start: 2024-06-26 | End: 2024-06-26

## 2024-06-26 RX ORDER — DOXYCYCLINE 100 MG/1
100 TABLET, FILM COATED ORAL DAILY
Qty: 7 | Refills: 0 | Status: ACTIVE | COMMUNITY
Start: 2024-06-26 | End: 1900-01-01

## 2024-06-27 NOTE — ASU PATIENT PROFILE, ADULT - NS PRO ABUSE SCREEN SUSPICION NEGLECT YN
71-year-old with stage Ib grade 1 endometrial cancer, COPD on chronic oxygen therapy.  MRD testing has been negative x 4 most recently 6/13/2024.  She declined adjuvant vaginal brachytherapy.  She is clinically without evidence of disease recurrence.  Her performance status is improving and is a 2.  1.  Continue MRD testing  2.  Return in 3 months for endometrial cancer surveillance.  
no

## 2024-07-02 ENCOUNTER — APPOINTMENT (OUTPATIENT)
Dept: RADIOLOGY | Facility: HOSPITAL | Age: 79
End: 2024-07-02

## 2024-07-02 ENCOUNTER — APPOINTMENT (OUTPATIENT)
Dept: THORACIC SURGERY | Facility: CLINIC | Age: 79
End: 2024-07-02
Payer: MEDICARE

## 2024-07-02 ENCOUNTER — OUTPATIENT (OUTPATIENT)
Dept: OUTPATIENT SERVICES | Facility: HOSPITAL | Age: 79
LOS: 1 days | End: 2024-07-02
Payer: MEDICARE

## 2024-07-02 ENCOUNTER — RESULT REVIEW (OUTPATIENT)
Age: 79
End: 2024-07-02

## 2024-07-02 VITALS
BODY MASS INDEX: 23.79 KG/M2 | RESPIRATION RATE: 17 BRPM | SYSTOLIC BLOOD PRESSURE: 110 MMHG | HEIGHT: 59 IN | HEART RATE: 72 BPM | OXYGEN SATURATION: 99 % | WEIGHT: 118 LBS | DIASTOLIC BLOOD PRESSURE: 72 MMHG

## 2024-07-02 DIAGNOSIS — Z90.722 ACQUIRED ABSENCE OF OVARIES, BILATERAL: Chronic | ICD-10-CM

## 2024-07-02 DIAGNOSIS — C34.91 MALIGNANT NEOPLASM OF UNSPECIFIED PART OF RIGHT BRONCHUS OR LUNG: ICD-10-CM

## 2024-07-02 DIAGNOSIS — C34.92 MALIGNANT NEOPLASM OF UNSPECIFIED PART OF LEFT BRONCHUS OR LUNG: ICD-10-CM

## 2024-07-02 DIAGNOSIS — Z98.890 OTHER SPECIFIED POSTPROCEDURAL STATES: Chronic | ICD-10-CM

## 2024-07-02 PROCEDURE — 99212 OFFICE O/P EST SF 10 MIN: CPT

## 2024-07-02 PROCEDURE — 71046 X-RAY EXAM CHEST 2 VIEWS: CPT | Mod: 26

## 2024-07-29 ENCOUNTER — APPOINTMENT (OUTPATIENT)
Dept: PULMONOLOGY | Facility: CLINIC | Age: 79
End: 2024-07-29
Payer: MEDICARE

## 2024-07-29 VITALS — DIASTOLIC BLOOD PRESSURE: 61 MMHG | OXYGEN SATURATION: 98 % | SYSTOLIC BLOOD PRESSURE: 103 MMHG | HEART RATE: 77 BPM

## 2024-07-29 PROCEDURE — 99213 OFFICE O/P EST LOW 20 MIN: CPT

## 2024-07-29 NOTE — HISTORY OF PRESENT ILLNESS
[TextBox_4] : Follow-up after recent wedge resection for pulmonary nodule.  No respiratory complaints continues on Advair inhaler.  Had follow-up chest x-ray done by surgeon.

## 2024-07-29 NOTE — ASSESSMENT
[FreeTextEntry1] : 78-year-old woman with a history of multiple pulmonary nodules has had several different procedures and has had several lesions diagnosis cancer.  All appear to be stage I lesions and no additional treatment appears to be required however I do suggest she follow-up with oncology

## 2024-08-06 ENCOUNTER — OUTPATIENT (OUTPATIENT)
Dept: OUTPATIENT SERVICES | Facility: HOSPITAL | Age: 79
LOS: 1 days | Discharge: HOME | End: 2024-08-06

## 2024-08-06 DIAGNOSIS — Z98.890 OTHER SPECIFIED POSTPROCEDURAL STATES: Chronic | ICD-10-CM

## 2024-08-06 DIAGNOSIS — C34.90 MALIGNANT NEOPLASM OF UNSPECIFIED PART OF UNSPECIFIED BRONCHUS OR LUNG: ICD-10-CM

## 2024-08-06 DIAGNOSIS — Z90.722 ACQUIRED ABSENCE OF OVARIES, BILATERAL: Chronic | ICD-10-CM

## 2024-08-06 NOTE — ASU PATIENT PROFILE, ADULT - NS PREOP UNDERSTANDS INFO
yes
Expected Date Of Service: 08/06/2024
Performing Laboratory: 0
Billing Type: Third-Party Bill
Bill For Surgical Tray: no

## 2024-08-08 ENCOUNTER — APPOINTMENT (OUTPATIENT)
Dept: HEMATOLOGY ONCOLOGY | Facility: CLINIC | Age: 79
End: 2024-08-08

## 2024-08-08 PROCEDURE — 99214 OFFICE O/P EST MOD 30 MIN: CPT

## 2024-08-08 NOTE — HISTORY OF PRESENT ILLNESS
[Disease: _____________________] : Disease: [unfilled] [T: ___] : T[unfilled] [N: ___] : N[unfilled] [M: ___] : M[unfilled] [AJCC Stage: ____] : AJCC Stage: [unfilled] [de-identified] : adeno ca [de-identified] : Ms. 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 Most recent imaging with New, right lung nodules. Referred to Dr. Bravo. Recent work up as below:  July, 2020: New finding in right lower lobe possible endobronchial lesion versus mucoid impaction. MRI did not demonstrate any conclusive findings.8/2020: s/p BAL. Path negative   PFTs on 3/8/22: FVC: 1.78 (84%); FEV1: 1.25 (78%); DLCO: 10.42 (59%)   CT Chest on 2/28/22: ([facility]) - Stable post op changes within the right hemithorax - Evolving, irregular, noncalcified semisolid RUL nodule: 9mm (Series 11, Image 134). Solid component measures 5mm. Solid component not present on CT from 9/4/15 and now solid component is more prominent with compared to CT from 8/24/21. The overall size of the lesion has increased from 9/4/15.  - Additional subcentimeter noncalcified solid and groundglass pulmonary nodules are redemonstrated and unchanged.  - Tubular branching soft tissue is again demonstrate within the posterior RLL likely representing mucoid impaction, unchanged.  - Stable, tubular branching soft tissue density is present within the periphery of the left lower lobe as well, unchanged, again, favor mucoid impaction.  - Left adrenal myolipoma   PET/CT on 3/11/22: ([facility]) - Non - FDG avid 9 x 7 mm irregular nodule in RUL (image 33) - New 1.2 x 1 cm nodular opacity in the RLL at the postsurgical site (Series 4, image 26) SUV 1.3  - Tubular branching soft tissue in the RUL without hypermetabolic uptake, likely mucoid impaction; 4 mm Peripheral nodule in the LL (Image 48) - No evidence of FDG avid lymphadenopathy   Patient was given a course of ABX on 04/05/2022 for 7 days.   CT chest on 05/23/2022: - Along the superior aspect of the right lower lobe suture is a 1.2 x 1.0 cm solid nodule series 10 image 123 which is unchanged from prior PET/CT but new from 2/28/2022. This was FDG avid. - Clustered nodules and tubular presumed airway impaction inferior to this nodule in the right lower lobe is unchanged. - 9 x 7 mm part solid nodule in the right upper lobe on series 6 image 135 is unchanged since 2/28/2022 but increased in size and density since 2/2/2021. - Other small solid and some solid nodules are stable, annotated series 10. Airway impaction and centrilobular nodules in the basilar left lower lobe are unchanged, series 10 image 241.  Now, s/p Flexible bronchoscopy, redo uniportal right video-assisted thoracic surgery, pneumonolysis, wedge resection of right upper lobe x1 and right lower lobe x2, and mediastinal lymph node dissection. Path of RUL wedge resection revealed adenocarcinoma, acinar predominant, 0.8 x 0.8 x 0.5 cm, G2, pT1a N0. RLL wedge resection revealed nonnecrotizing granulomas and focal necrotizing. Additional Findings: Emphysema. Metallic coil identified in part # 1 Special stains: AFB stain is negative for acid fast bacilli. GMS stain is negative for fungal organisms. Postoperative course was complicated by small air leak that eventually resolved. She was discharged home on 6/21/22 with a 10-day course of antibiotic for suspected RLL PNA. Contracted Hives on Day 5 of Augmenting. Contacted service line and was advised to d/c.  Patient was seen on 06/28/2022. CXR showed Moderate right pleural effusion, recommended to RTC in 1 week with repeat CT Chest to further evaluate as well as to discuss final path.   Patient presents today for follow up. Today, endorses good pain control. No worsening shortness of breath upon exertion. Today, patient denies chest pain, cough, hemoptysis, fever, chills, night sweats, lightheadedness or dizziness.  CT chest on 09/28/2022: - Status post right upper and lower lobe wedge resections. Decreased, now trace right pleural effusion with resolved pleural air component. - Stable 1.8 cm hypodense tubular right lower lobe nodule, possibly chronic bronchial impaction.  CT chest on 1/24/23: - Post op changes - 1.8 cm nodular opacity in RLL; Unchanged in its size and appearance when compared to previous exam. - Few cluster of tiny nodules in the LLL likely representing mucoid impacted distal small airways are also unchanged. - Below the diaphragm, visualized portions of the abdomen demonstrate left adrenal gland myelolipoma.  CT Chest on 4/25/23: - Stable postoperative changes following right upper and lower lobe wedge resections. - Unchanged 1.8 cm nodular opacity in the RLL (series 2, image 46). Stable clustered tree-in-bud nodularity in the posterior LLL  CT Chest on 08/01/2023: - Status post right upper and lower lobe wedge resections. - Stable 1.7 cm right lower lobe tubular density on series 2, image 50, as well as a small cluster of nodules in the left lower lobe on images 81-83 likely due to small airways disease no new nodules. -Unchanged mild right pleural thickening.  CT on 11/7/2023: - Unchanged scarring in the right lung from multiple wedge resections - Unchanged 1.7 cm RLL tubular nodule (2-48) - Unchanged small cluster of nodules in the LLL, likely mucus impacted bronchioles (2-79)  CT Chest on 2/6/2024: - Multiple right lung sublobar resections. - Unchanged circumscribed tubular opacity in the right lower lobe, likely a mucous impacted airway (smaller compared to 2/2/2021). - Unchanged small cluster of nodules in the left lower lobe, also likely due to mucus impaction.  PFTs on 2/20/24: FVC: 1.7 (87%); FEV1: 1.3 (88%); DLCO: 9.46 (55%)  CT Chest on 6/4/2024: - 1.2 cm groundglass opacity in superior segment of left lower lobe with interval increase in density. The increased density can be related to an adjacent impacted distal airway. - Postsurgical changes related to right lung wedge resections. - Stable 1.9 cm tubular opacity in right lower lobe likely mucoid impacted airway  Now, s/p Flex bronc, uniportal Left VATS, wedge resection of left lower lobe, intercostal nerve block, and MLND on 6/19/24. Path of LLL wedge: Minimally invasive adenocarcinoma, non-mucinous pattern, spanning 0.9 cm with 0.1 cm of invasion, acinar pattern. No pleural invasion is seen. No vascular invasion is seen. Tumor is 1.2 cm from the stapled margin. pT1mi N0  Post op c/b cellulitis around surgical site. Treated with abx.   Patient presents today for post op evaluation. Overall, she reports to be feeling well. Denies any chest pain, shortness of breath, cough, hemoptysis, fever, or chills.

## 2024-08-08 NOTE — ASSESSMENT
[FreeTextEntry1] : 75 yo w with remote heavy smoking hx, with chronic lung nodules, multiple primary lung cancers, stage I resected in 2014 and most recently Stage IA of RUL, s/p wedge resection of RUl as well as RLL nodules and mediastinal LN sampling. Tumor in RUL was stage IA and RLL ws benign- granuloma (non-necrotizng with necrotizing component).  I reviewed the chart in its entirety, as well as images, path, labs including [id number] I had seen pt 2 years ago Given stage IA disease, there is no role for systemic adjuvant tx and it was not recommended New microscopic lung ca in LLL, resected No indication for any systemic tx Pt has had 3 primary lung cancers resected and remains at a risk for developing another due to field cancerization likely related to prior smoking or chronic lung disease. However, at this time, there are no agents that have been shown to benefit as prophylactic agents. I also discussed optimal surveillance plan  Pt will continue follow up with Hemant Valdovinos and Shelly I will see her again as needed

## 2024-08-13 VITALS
SYSTOLIC BLOOD PRESSURE: 120 MMHG | HEIGHT: 59 IN | WEIGHT: 120 LBS | RESPIRATION RATE: 16 BRPM | BODY MASS INDEX: 24.19 KG/M2 | OXYGEN SATURATION: 100 % | DIASTOLIC BLOOD PRESSURE: 55 MMHG | HEART RATE: 79 BPM

## 2024-09-03 NOTE — HISTORY OF PRESENT ILLNESS
[Never] : never [TextBox_4] : HOSEA GRAY is a 77 year old female, with history of asthma, lung cancer, who presents to the office for follow up evaluation. Patient reports that she is feeling well overall with no respiratory complaints. She states that she continues to take Advair daily for asthma treatment.  She states that she recently had a fall accident and injured her chin which required stitches.  Had recent follow-up chest CT No

## 2024-09-24 ENCOUNTER — OUTPATIENT (OUTPATIENT)
Dept: OUTPATIENT SERVICES | Facility: HOSPITAL | Age: 79
LOS: 1 days | End: 2024-09-24
Payer: MEDICARE

## 2024-09-24 ENCOUNTER — APPOINTMENT (OUTPATIENT)
Dept: CT IMAGING | Facility: IMAGING CENTER | Age: 79
End: 2024-09-24
Payer: MEDICARE

## 2024-09-24 DIAGNOSIS — Z90.722 ACQUIRED ABSENCE OF OVARIES, BILATERAL: Chronic | ICD-10-CM

## 2024-09-24 DIAGNOSIS — C34.92 MALIGNANT NEOPLASM OF UNSPECIFIED PART OF LEFT BRONCHUS OR LUNG: ICD-10-CM

## 2024-09-24 DIAGNOSIS — Z98.890 OTHER SPECIFIED POSTPROCEDURAL STATES: Chronic | ICD-10-CM

## 2024-09-24 PROCEDURE — 71250 CT THORAX DX C-: CPT

## 2024-09-24 PROCEDURE — 71250 CT THORAX DX C-: CPT | Mod: 26,MH

## 2024-10-01 ENCOUNTER — APPOINTMENT (OUTPATIENT)
Dept: THORACIC SURGERY | Facility: CLINIC | Age: 79
End: 2024-10-01
Payer: MEDICARE

## 2024-10-01 DIAGNOSIS — C34.91 MALIGNANT NEOPLASM OF UNSPECIFIED PART OF RIGHT BRONCHUS OR LUNG: ICD-10-CM

## 2024-10-01 DIAGNOSIS — C34.92 MALIGNANT NEOPLASM OF UNSPECIFIED PART OF LEFT BRONCHUS OR LUNG: ICD-10-CM

## 2024-10-01 PROCEDURE — 99214 OFFICE O/P EST MOD 30 MIN: CPT

## 2024-10-01 NOTE — HISTORY OF PRESENT ILLNESS
[FreeTextEntry1] : Ms. HOSEA GRAY, 78 year old female, former smoker (3-4PPDx 15 years; Quit in 1980's), w/ hx of History of Johnson's esophagus; lung cancer, status post RUL resection and RLL superior segmentectomy 2/25/2014 at [facility] (Stage I lung cancer?), crest syndrome, Has been undergoing active surveillance. Referred in March, 2022 for RUL nodule  PFTs on 3/8/22: FVC: 1.78 (84%); FEV1: 1.25 (78%); DLCO: 10.42 (59%)  Patient was given a course of ABX on 04/05/2022 for 7 days.  Now, s/p Flexible bronchoscopy, redo uniportal right video-assisted thoracic surgery, pneumonolysis, wedge resection of right upper lobe x1 and right lower lobe x2, and mediastinal lymph node dissection on 6/17/22. Path of RUL wedge resection revealed adenocarcinoma, acinar predominant, 0.8 x 0.8 x 0.5 cm, G2, pT1a N0. RLL wedge resection revealed nonnecrotizing granulomas and focal necrotizing. Additional Findings: Emphysema. Metallic coil identified in part # 1 Special stains: AFB stain is negative for acid fast bacilli. GMS stain is negative for fungal organisms.  Postoperative course was complicated by small air leak that eventually resolved. and she was discharged home on 6/21/22 with a 10-day course of antibiotic for suspected RLL PNA. Contracted Hives on Day 5 of Augmentin. Contacted service line and was advised to d/c.  Patient was seen on 06/28/2022. CXR showed Moderate right pleural effusion, recommended to RTC in 1 week with repeat CT Chest to further evaluate as well as to discuss final path.  CT chest on 06/28/2022: - Stable post op findings.  Patient was evaluated by Dr. Kirkland on 07/13/2022, there is no role for systemic adjuvant tx.  CT chest on 09/28/2022: - Status post right upper and lower lobe wedge resections. Decreased, now trace right pleural effusion with resolved pleural air component. - Stable 1.8 cm hypodense tubular right lower lobe nodule, possibly chronic bronchial impaction.  CT chest on 1/24/23: - Post op changes - 1.8 cm nodular opacity in RLL; Unchanged in its size and appearance when compared to previous exam. - Few cluster of tiny nodules in the LLL likely representing mucoid impacted distal small airways are also unchanged. - Below the diaphragm, visualized portions of the abdomen demonstrate left adrenal gland myelolipoma.  CT Chest on 4/25/23: - Stable postoperative changes following right upper and lower lobe wedge resections. - Unchanged 1.8 cm nodular opacity in the RLL (series 2, image 46). Stable clustered tree-in-bud nodularity in the posterior LLL  CT Chest on 08/01/2023: - Status post right upper and lower lobe wedge resections. - Stable 1.7 cm right lower lobe tubular density on series 2, image 50, as well as a small cluster of nodules in the left lower lobe on images 81-83 likely due to small airways disease no new nodules. -Unchanged mild right pleural thickening.  CT on 11/7/2023: - Unchanged scarring in the right lung from multiple wedge resections - Unchanged 1.7 cm RLL tubular nodule (2-48) - Unchanged small cluster of nodules in the LLL, likely mucus impacted bronchioles (2-79)  CT Chest on 2/6/2024: - Multiple right lung sublobar resections. - Unchanged circumscribed tubular opacity in the right lower lobe, likely a mucous impacted airway (smaller compared to 2/2/2021). - Unchanged small cluster of nodules in the left lower lobe, also likely due to mucus impaction.  PFTs on 2/20/24: FVC: 1.7 (87%); FEV1: 1.3 (88%); DLCO: 9.46 (55%)  CT Chest on 6/4/2024: - 1.2 cm groundglass opacity in superior segment of left lower lobe with interval increase in density. The increased density can be related to an adjacent impacted distal airway. - Postsurgical changes related to right lung wedge resections. - Stable 1.9 cm tubular opacity in right lower lobe likely mucoid impacted airway  Now, s/p Flex bronch, uniportal Left VATS, wedge resection of left lower lobe, intercostal nerve block, and MLND on 6/19/24. Path of LLL wedge: Minimally invasive adenocarcinoma, non-mucinous pattern, spanning 0.9 cm with 0.1 cm of invasion, acinar pattern. No pleural invasion is seen. No vascular invasion is seen. Tumor is 1.2 cm from the stapled margin. pT1mi N0  Post op CXR with no obvious ptx but small left pleural effusion. Lasix IV x 1 given prior to discharge.  06/26/24: Patient contacted office with sight bleeding to incision site, picture of site reviewed, Irritation noted from stitch as well as moisture to surrounding skin. Rash with Amoxicillin. Trial of Doxy 100 mg QD x 7 days given.  Depression screening completed today (7/2/24)- negative.  CXR on 7/2/24:  -  Status post right thoracotomy with clear lungs  CT Chest on 9/24/24:  -  Multiple right lung wedge resections with stable postoperative change. Interval left lower lobe superior segment wedge resection. - Tubular RLL opacity measuring 1.6 x 0.9 cm (image 52, series 2) is unchanged compared to prior exams, for example 11/7/2023 CT chest. - Unchanged mucoid impacted airways at the left base. Couple of left upper lobe 0.3 cm nodules are unchanged (images 37 and 39, series 2). - Trace left pleural effusion.  Patient presents today for follow up.    **PHQ-2 completed on 7/2/24.

## 2024-10-01 NOTE — ASSESSMENT
[FreeTextEntry1] : Ms. HOSEA GRAY, 78 year old female, former smoker (3-4PPDx 15 years; Quit in 1980's), w/ hx of History of Johnson's esophagus; lung cancer, status post RUL resection and RLL superior segmentectomy 2/25/2014 at [facility] (Stage I lung cancer?), crest syndrome, Has been undergoing active surveillance. Referred in March, 2022 for RUL nodule  Now, s/p Flexible bronchoscopy, redo uniportal right video-assisted thoracic surgery, pneumonolysis, wedge resection of right upper lobe x1 and right lower lobe x2, and mediastinal lymph node dissection on 6/17/22. Path of RUL wedge resection revealed adenocarcinoma, acinar predominant, 0.8 x 0.8 x 0.5 cm, G2, pT1a N0. RLL wedge resection revealed nonnecrotizing granulomas and focal necrotizing. Additional Findings: Emphysema. Metallic coil identified in part # 1 Special stains: AFB stain is negative for acid fast bacilli. GMS stain is negative for fungal organisms.  Now, s/p Flex bronch, uniportal Left VATS, wedge resection of left lower lobe, intercostal nerve block, and MLND on 6/19/24. Path of LLL wedge: Minimally invasive adenocarcinoma, non-mucinous pattern, spanning 0.9 cm with 0.1 cm of invasion, acinar pattern. No pleural invasion is seen. No vascular invasion is seen. Tumor is 1.2 cm from the stapled margin. pT1mi N0  CT Chest on 9/24/24:  -  Multiple right lung wedge resections with stable postoperative change. Interval left lower lobe superior segment wedge resection. - Tubular RLL opacity measuring 1.6 x 0.9 cm (image 52, series 2) is unchanged compared to prior exams, for example 11/7/2023 CT chest. - Unchanged mucoid impacted airways at the left base. Couple of left upper lobe 0.3 cm nodules are unchanged (images 37 and 39, series 2). - Trace left pleural effusion.  I have independently reviewed the medical records and imaging at the time of this office consultation, and discussed the following interpretations with the patient: 1. CT scan showed no evidence of recurrence, recommended patient to return to office in 3mo w/ CT Chest w/o contrast.   I, Dr. HAHN [facility], personally performed the evaluation and management (E/M) services for this established patient who presents today with (a) new problem(s)/exacerbation of (an) existing condition(s). That E/M includes conducting the examination, assessing all new/exacerbated conditions, and establishing a new plan of care. Today, my ACP, Noy Watkins NP was here to observe my evaluation and management services for this new problem/exacerbated condition to be followed going forward.

## 2024-10-01 NOTE — HISTORY OF PRESENT ILLNESS
[FreeTextEntry1] : Ms. HOSEA GRAY, 78 year old female, former smoker (3-4PPDx 15 years; Quit in 1980's), w/ hx of History of Johnson's esophagus; lung cancer, status post RUL resection and RLL superior segmentectomy 2/25/2014 at [facility] (Stage I lung cancer?), crest syndrome, Has been undergoing active surveillance. Referred in March, 2022 for RUL nodule  PFTs on 3/8/22: FVC: 1.78 (84%); FEV1: 1.25 (78%); DLCO: 10.42 (59%)  Patient was given a course of ABX on 04/05/2022 for 7 days.  Now, s/p Flexible bronchoscopy, redo uniportal right video-assisted thoracic surgery, pneumonolysis, wedge resection of right upper lobe x1 and right lower lobe x2, and mediastinal lymph node dissection on 6/17/22. Path of RUL wedge resection revealed adenocarcinoma, acinar predominant, 0.8 x 0.8 x 0.5 cm, G2, pT1a N0. RLL wedge resection revealed nonnecrotizing granulomas and focal necrotizing. Additional Findings: Emphysema. Metallic coil identified in part # 1 Special stains: AFB stain is negative for acid fast bacilli. GMS stain is negative for fungal organisms.  Postoperative course was complicated by small air leak that eventually resolved. and she was discharged home on 6/21/22 with a 10-day course of antibiotic for suspected RLL PNA. Contracted Hives on Day 5 of Augmentin. Contacted service line and was advised to d/c.  Patient was seen on 06/28/2022. CXR showed Moderate right pleural effusion, recommended to RTC in 1 week with repeat CT Chest to further evaluate as well as to discuss final path.  CT chest on 06/28/2022: - Stable post op findings.  Patient was evaluated by Dr. Kirkland on 07/13/2022, there is no role for systemic adjuvant tx.  CT chest on 09/28/2022: - Status post right upper and lower lobe wedge resections. Decreased, now trace right pleural effusion with resolved pleural air component. - Stable 1.8 cm hypodense tubular right lower lobe nodule, possibly chronic bronchial impaction.  CT chest on 1/24/23: - Post op changes - 1.8 cm nodular opacity in RLL; Unchanged in its size and appearance when compared to previous exam. - Few cluster of tiny nodules in the LLL likely representing mucoid impacted distal small airways are also unchanged. - Below the diaphragm, visualized portions of the abdomen demonstrate left adrenal gland myelolipoma.  CT Chest on 4/25/23: - Stable postoperative changes following right upper and lower lobe wedge resections. - Unchanged 1.8 cm nodular opacity in the RLL (series 2, image 46). Stable clustered tree-in-bud nodularity in the posterior LLL  CT Chest on 08/01/2023: - Status post right upper and lower lobe wedge resections. - Stable 1.7 cm right lower lobe tubular density on series 2, image 50, as well as a small cluster of nodules in the left lower lobe on images 81-83 likely due to small airways disease no new nodules. -Unchanged mild right pleural thickening.  CT on 11/7/2023: - Unchanged scarring in the right lung from multiple wedge resections - Unchanged 1.7 cm RLL tubular nodule (2-48) - Unchanged small cluster of nodules in the LLL, likely mucus impacted bronchioles (2-79)  CT Chest on 2/6/2024: - Multiple right lung sublobar resections. - Unchanged circumscribed tubular opacity in the right lower lobe, likely a mucous impacted airway (smaller compared to 2/2/2021). - Unchanged small cluster of nodules in the left lower lobe, also likely due to mucus impaction.  PFTs on 2/20/24: FVC: 1.7 (87%); FEV1: 1.3 (88%); DLCO: 9.46 (55%)  CT Chest on 6/4/2024: - 1.2 cm groundglass opacity in superior segment of left lower lobe with interval increase in density. The increased density can be related to an adjacent impacted distal airway. - Postsurgical changes related to right lung wedge resections. - Stable 1.9 cm tubular opacity in right lower lobe likely mucoid impacted airway  Now, s/p Flex bronch, uniportal Left VATS, wedge resection of left lower lobe, intercostal nerve block, and MLND on 6/19/24. Path of LLL wedge: Minimally invasive adenocarcinoma, non-mucinous pattern, spanning 0.9 cm with 0.1 cm of invasion, acinar pattern. No pleural invasion is seen. No vascular invasion is seen. Tumor is 1.2 cm from the stapled margin. pT1mi N0  Post op CXR with no obvious ptx but small left pleural effusion. Lasix IV x 1 given prior to discharge.  06/26/24: Patient contacted office with sight bleeding to incision site, picture of site reviewed, Irritation noted from stitch as well as moisture to surrounding skin. Rash with Amoxicillin. Trial of Doxy 100 mg QD x 7 days given.  Depression screening completed today (7/2/24)- negative.  CXR on 7/2/24:  -  Status post right thoracotomy with clear lungs  CT Chest on 9/24/24:  -  Multiple right lung wedge resections with stable postoperative change. Interval left lower lobe superior segment wedge resection. - Tubular RLL opacity measuring 1.6 x 0.9 cm (image 52, series 2) is unchanged compared to prior exams, for example 11/7/2023 CT chest. - Unchanged mucoid impacted airways at the left base. Couple of left upper lobe 0.3 cm nodules are unchanged (images 37 and 39, series 2). - Trace left pleural effusion.  Patient presents today for follow up.    **PHQ-2 completed on 7/2/24.

## 2024-10-01 NOTE — ASSESSMENT
[FreeTextEntry1] : Ms. HOSEA GRAY, 78 year old female, former smoker (3-4PPDx 15 years; Quit in 1980's), w/ hx of History of Johnson's esophagus; lung cancer, status post RUL resection and RLL superior segmentectomy 2/25/2014 at [facility] (Stage I lung cancer?), crest syndrome, Has been undergoing active surveillance. Referred in March, 2022 for RUL nodule  Now, s/p Flexible bronchoscopy, redo uniportal right video-assisted thoracic surgery, pneumonolysis, wedge resection of right upper lobe x1 and right lower lobe x2, and mediastinal lymph node dissection on 6/17/22. Path of RUL wedge resection revealed adenocarcinoma, acinar predominant, 0.8 x 0.8 x 0.5 cm, G2, pT1a N0. RLL wedge resection revealed nonnecrotizing granulomas and focal necrotizing. Additional Findings: Emphysema. Metallic coil identified in part # 1 Special stains: AFB stain is negative for acid fast bacilli. GMS stain is negative for fungal organisms.  Now, s/p Flex bronch, uniportal Left VATS, wedge resection of left lower lobe, intercostal nerve block, and MLND on 6/19/24. Path of LLL wedge: Minimally invasive adenocarcinoma, non-mucinous pattern, spanning 0.9 cm with 0.1 cm of invasion, acinar pattern. No pleural invasion is seen. No vascular invasion is seen. Tumor is 1.2 cm from the stapled margin. pT1mi N0  CT Chest on 9/24/24:  -  Multiple right lung wedge resections with stable postoperative change. Interval left lower lobe superior segment wedge resection. - Tubular RLL opacity measuring 1.6 x 0.9 cm (image 52, series 2) is unchanged compared to prior exams, for example 11/7/2023 CT chest. - Unchanged mucoid impacted airways at the left base. Couple of left upper lobe 0.3 cm nodules are unchanged (images 37 and 39, series 2). - Trace left pleural effusion.  I have independently reviewed the medical records and imaging at the time of this office consultation, and discussed the following interpretations with the patient: 1. CT scan showed no evidence of recurrence, recommended patient to return to office in 3mo w/ CT Chest w/o contrast.   I, Dr. HAHN [facility], personally performed the evaluation and management (E/M) services for this established patient who presents today with (a) new problem(s)/exacerbation of (an) existing condition(s). That E/M includes conducting the examination, assessing all new/exacerbated conditions, and establishing a new plan of care. Today, my ACP, Noy Watkins NP was here to observe my evaluation and management services for this new problem/exacerbated condition to be followed going forward.

## 2024-10-29 ENCOUNTER — APPOINTMENT (OUTPATIENT)
Dept: PULMONOLOGY | Facility: CLINIC | Age: 79
End: 2024-10-29
Payer: MEDICARE

## 2024-10-29 ENCOUNTER — APPOINTMENT (OUTPATIENT)
Dept: PULMONOLOGY | Facility: CLINIC | Age: 79
End: 2024-10-29

## 2024-10-29 VITALS — HEART RATE: 81 BPM | DIASTOLIC BLOOD PRESSURE: 71 MMHG | SYSTOLIC BLOOD PRESSURE: 114 MMHG | OXYGEN SATURATION: 97 %

## 2024-10-29 DIAGNOSIS — J45.909 UNSPECIFIED ASTHMA, UNCOMPLICATED: ICD-10-CM

## 2024-10-29 DIAGNOSIS — C34.92 MALIGNANT NEOPLASM OF UNSPECIFIED PART OF LEFT BRONCHUS OR LUNG: ICD-10-CM

## 2024-10-29 DIAGNOSIS — C34.91 MALIGNANT NEOPLASM OF UNSPECIFIED PART OF RIGHT BRONCHUS OR LUNG: ICD-10-CM

## 2024-10-29 LAB — POCT - HEMOGLOBIN (HGB), QUANTITATIVE, TRANSCUTANEOUS: 10.2

## 2024-10-29 PROCEDURE — 94010 BREATHING CAPACITY TEST: CPT

## 2024-10-29 PROCEDURE — 88738 HGB QUANT TRANSCUTANEOUS: CPT

## 2024-10-29 PROCEDURE — 94729 DIFFUSING CAPACITY: CPT

## 2024-10-29 PROCEDURE — 99213 OFFICE O/P EST LOW 20 MIN: CPT | Mod: 25

## 2024-10-29 PROCEDURE — 94727 GAS DIL/WSHOT DETER LNG VOL: CPT

## 2024-11-15 ENCOUNTER — APPOINTMENT (OUTPATIENT)
Dept: ORTHOPEDIC SURGERY | Facility: CLINIC | Age: 79
End: 2024-11-15
Payer: MEDICARE

## 2024-11-15 VITALS — HEIGHT: 59 IN | WEIGHT: 116 LBS | BODY MASS INDEX: 23.39 KG/M2

## 2024-11-15 DIAGNOSIS — M17.0 BILATERAL PRIMARY OSTEOARTHRITIS OF KNEE: ICD-10-CM

## 2024-11-15 PROCEDURE — G2211 COMPLEX E/M VISIT ADD ON: CPT

## 2024-11-15 PROCEDURE — 73564 X-RAY EXAM KNEE 4 OR MORE: CPT | Mod: 50

## 2024-11-15 PROCEDURE — 99213 OFFICE O/P EST LOW 20 MIN: CPT

## 2024-11-20 ENCOUNTER — APPOINTMENT (OUTPATIENT)
Dept: ORTHOPEDIC SURGERY | Facility: CLINIC | Age: 79
End: 2024-11-20
Payer: MEDICARE

## 2024-11-20 DIAGNOSIS — M65.231 CALCIFIC TENDINITIS, RIGHT FOREARM: ICD-10-CM

## 2024-11-20 DIAGNOSIS — M15.1 HEBERDEN'S NODES (WITH ARTHROPATHY): ICD-10-CM

## 2024-11-20 DIAGNOSIS — M79.644 PAIN IN RIGHT FINGER(S): ICD-10-CM

## 2024-11-20 DIAGNOSIS — M65.312 TRIGGER THUMB, LEFT THUMB: ICD-10-CM

## 2024-11-20 PROCEDURE — 99213 OFFICE O/P EST LOW 20 MIN: CPT

## 2024-11-20 PROCEDURE — 73140 X-RAY EXAM OF FINGER(S): CPT | Mod: F7

## 2024-11-25 ENCOUNTER — APPOINTMENT (OUTPATIENT)
Dept: ORTHOPEDIC SURGERY | Facility: CLINIC | Age: 79
End: 2024-11-25

## 2024-11-25 PROCEDURE — 20610 DRAIN/INJ JOINT/BURSA W/O US: CPT | Mod: 50

## 2024-12-02 ENCOUNTER — APPOINTMENT (OUTPATIENT)
Dept: ORTHOPEDIC SURGERY | Facility: CLINIC | Age: 79
End: 2024-12-02
Payer: MEDICARE

## 2024-12-02 PROCEDURE — 20610 DRAIN/INJ JOINT/BURSA W/O US: CPT | Mod: 50

## 2024-12-03 ENCOUNTER — APPOINTMENT (OUTPATIENT)
Dept: ORTHOPEDIC SURGERY | Facility: CLINIC | Age: 79
End: 2024-12-03
Payer: MEDICARE

## 2024-12-03 DIAGNOSIS — M25.552 PAIN IN LEFT HIP: ICD-10-CM

## 2024-12-03 DIAGNOSIS — G89.29 PAIN IN LEFT HIP: ICD-10-CM

## 2024-12-03 PROCEDURE — 73502 X-RAY EXAM HIP UNI 2-3 VIEWS: CPT

## 2024-12-03 PROCEDURE — 99213 OFFICE O/P EST LOW 20 MIN: CPT

## 2024-12-04 ENCOUNTER — APPOINTMENT (OUTPATIENT)
Dept: ORTHOPEDIC SURGERY | Facility: CLINIC | Age: 79
End: 2024-12-04
Payer: MEDICARE

## 2024-12-04 DIAGNOSIS — M65.312 TRIGGER THUMB, LEFT THUMB: ICD-10-CM

## 2024-12-04 DIAGNOSIS — M15.1 HEBERDEN'S NODES (WITH ARTHROPATHY): ICD-10-CM

## 2024-12-04 DIAGNOSIS — M65.231 CALCIFIC TENDINITIS, RIGHT FOREARM: ICD-10-CM

## 2024-12-04 PROBLEM — S63.502A LEFT WRIST SPRAIN: Status: ACTIVE | Noted: 2024-12-04

## 2024-12-04 PROCEDURE — 73110 X-RAY EXAM OF WRIST: CPT | Mod: LT

## 2024-12-04 PROCEDURE — 99214 OFFICE O/P EST MOD 30 MIN: CPT

## 2024-12-04 PROCEDURE — 73130 X-RAY EXAM OF HAND: CPT | Mod: LT

## 2024-12-06 RX ORDER — CELECOXIB 200 MG/1
200 CAPSULE ORAL TWICE DAILY
Qty: 30 | Refills: 0 | Status: ACTIVE | COMMUNITY
Start: 2024-12-06 | End: 1900-01-01

## 2024-12-09 ENCOUNTER — APPOINTMENT (OUTPATIENT)
Dept: ORTHOPEDIC SURGERY | Facility: CLINIC | Age: 79
End: 2024-12-09
Payer: MEDICARE

## 2024-12-09 DIAGNOSIS — M17.0 BILATERAL PRIMARY OSTEOARTHRITIS OF KNEE: ICD-10-CM

## 2024-12-09 PROCEDURE — 20610 DRAIN/INJ JOINT/BURSA W/O US: CPT | Mod: 50

## 2024-12-16 ENCOUNTER — APPOINTMENT (OUTPATIENT)
Dept: ORTHOPEDIC SURGERY | Facility: CLINIC | Age: 79
End: 2024-12-16
Payer: MEDICARE

## 2024-12-16 VITALS — BODY MASS INDEX: 24.19 KG/M2 | HEIGHT: 59 IN | WEIGHT: 120 LBS

## 2024-12-16 DIAGNOSIS — M41.50 OTHER SECONDARY SCOLIOSIS, SITE UNSPECIFIED: ICD-10-CM

## 2024-12-16 DIAGNOSIS — M54.16 RADICULOPATHY, LUMBAR REGION: ICD-10-CM

## 2024-12-16 DIAGNOSIS — M43.10 SPONDYLOLISTHESIS, SITE UNSPECIFIED: ICD-10-CM

## 2024-12-16 PROCEDURE — 99214 OFFICE O/P EST MOD 30 MIN: CPT

## 2024-12-16 PROCEDURE — 72170 X-RAY EXAM OF PELVIS: CPT

## 2024-12-16 PROCEDURE — 72110 X-RAY EXAM L-2 SPINE 4/>VWS: CPT

## 2024-12-16 RX ORDER — METHYLPREDNISOLONE 4 MG/1
4 TABLET ORAL
Qty: 1 | Refills: 0 | Status: ACTIVE | COMMUNITY
Start: 2024-12-16 | End: 1900-01-01

## 2024-12-16 RX ORDER — GABAPENTIN 100 MG/1
100 CAPSULE ORAL
Qty: 30 | Refills: 2 | Status: ACTIVE | COMMUNITY
Start: 2024-12-16 | End: 1900-01-01

## 2024-12-17 ENCOUNTER — OUTPATIENT (OUTPATIENT)
Dept: OUTPATIENT SERVICES | Facility: HOSPITAL | Age: 79
LOS: 1 days | End: 2024-12-17
Payer: MEDICARE

## 2024-12-17 ENCOUNTER — APPOINTMENT (OUTPATIENT)
Dept: MRI IMAGING | Facility: IMAGING CENTER | Age: 79
End: 2024-12-17
Payer: MEDICARE

## 2024-12-17 DIAGNOSIS — C34.91 MALIGNANT NEOPLASM OF UNSPECIFIED PART OF RIGHT BRONCHUS OR LUNG: ICD-10-CM

## 2024-12-17 DIAGNOSIS — M54.16 RADICULOPATHY, LUMBAR REGION: ICD-10-CM

## 2024-12-17 DIAGNOSIS — M41.50 OTHER SECONDARY SCOLIOSIS, SITE UNSPECIFIED: ICD-10-CM

## 2024-12-17 DIAGNOSIS — Z90.722 ACQUIRED ABSENCE OF OVARIES, BILATERAL: Chronic | ICD-10-CM

## 2024-12-17 DIAGNOSIS — Z98.890 OTHER SPECIFIED POSTPROCEDURAL STATES: Chronic | ICD-10-CM

## 2024-12-17 DIAGNOSIS — M43.10 SPONDYLOLISTHESIS, SITE UNSPECIFIED: ICD-10-CM

## 2024-12-17 PROCEDURE — 72148 MRI LUMBAR SPINE W/O DYE: CPT | Mod: 26,MH

## 2024-12-18 ENCOUNTER — APPOINTMENT (OUTPATIENT)
Dept: ORTHOPEDIC SURGERY | Facility: CLINIC | Age: 79
End: 2024-12-18
Payer: MEDICARE

## 2024-12-18 DIAGNOSIS — S52.202A UNSPECIFIED FRACTURE OF SHAFT OF LEFT ULNA, INITIAL ENCOUNTER FOR CLOSED FRACTURE: ICD-10-CM

## 2024-12-18 DIAGNOSIS — S63.502A UNSPECIFIED SPRAIN OF LEFT WRIST, INITIAL ENCOUNTER: ICD-10-CM

## 2024-12-18 PROCEDURE — 73110 X-RAY EXAM OF WRIST: CPT | Mod: LT

## 2024-12-18 PROCEDURE — 73130 X-RAY EXAM OF HAND: CPT | Mod: LT

## 2024-12-18 PROCEDURE — 73080 X-RAY EXAM OF ELBOW: CPT | Mod: LT

## 2024-12-18 PROCEDURE — 99214 OFFICE O/P EST MOD 30 MIN: CPT

## 2024-12-20 ENCOUNTER — NON-APPOINTMENT (OUTPATIENT)
Age: 79
End: 2024-12-20

## 2024-12-23 RX ORDER — DICLOFENAC SODIUM 50 MG/1
50 TABLET, DELAYED RELEASE ORAL
Qty: 30 | Refills: 0 | Status: ACTIVE | COMMUNITY
Start: 2024-12-23 | End: 1900-01-01

## 2024-12-23 RX ORDER — LIDOCAINE 5% 700 MG/1
5 PATCH TOPICAL
Qty: 30 | Refills: 0 | Status: ACTIVE | COMMUNITY
Start: 2024-12-23 | End: 1900-01-01

## 2024-12-30 ENCOUNTER — APPOINTMENT (OUTPATIENT)
Dept: ORTHOPEDIC SURGERY | Facility: CLINIC | Age: 79
End: 2024-12-30

## 2025-01-02 ENCOUNTER — APPOINTMENT (OUTPATIENT)
Dept: CT IMAGING | Facility: IMAGING CENTER | Age: 80
End: 2025-01-02

## 2025-01-02 PROCEDURE — 71250 CT THORAX DX C-: CPT | Mod: 26,MH

## 2025-01-02 PROCEDURE — 72148 MRI LUMBAR SPINE W/O DYE: CPT

## 2025-01-02 PROCEDURE — 71250 CT THORAX DX C-: CPT

## 2025-01-06 ENCOUNTER — APPOINTMENT (OUTPATIENT)
Dept: ORTHOPEDIC SURGERY | Facility: CLINIC | Age: 80
End: 2025-01-06
Payer: MEDICARE

## 2025-01-06 DIAGNOSIS — M43.10 SPONDYLOLISTHESIS, SITE UNSPECIFIED: ICD-10-CM

## 2025-01-06 DIAGNOSIS — M80.00XS AGE-RELATED OSTEOPOROSIS WITH CURRENT PATHOLOGICAL FRACTURE, UNSPECIFIED SITE, SEQUELA: ICD-10-CM

## 2025-01-06 DIAGNOSIS — M51.26 OTHER INTERVERTEBRAL DISC DISPLACEMENT, LUMBAR REGION: ICD-10-CM

## 2025-01-06 DIAGNOSIS — M41.50 OTHER SECONDARY SCOLIOSIS, SITE UNSPECIFIED: ICD-10-CM

## 2025-01-06 PROCEDURE — 99214 OFFICE O/P EST MOD 30 MIN: CPT

## 2025-01-08 ENCOUNTER — APPOINTMENT (OUTPATIENT)
Dept: ORTHOPEDIC SURGERY | Facility: CLINIC | Age: 80
End: 2025-01-08
Payer: MEDICARE

## 2025-01-08 DIAGNOSIS — M25.532 PAIN IN LEFT WRIST: ICD-10-CM

## 2025-01-08 DIAGNOSIS — M15.1 HEBERDEN'S NODES (WITH ARTHROPATHY): ICD-10-CM

## 2025-01-08 PROCEDURE — 73110 X-RAY EXAM OF WRIST: CPT | Mod: LT

## 2025-01-08 PROCEDURE — 29075 APPL CST ELBW FNGR SHORT ARM: CPT | Mod: LT

## 2025-01-08 PROCEDURE — 99214 OFFICE O/P EST MOD 30 MIN: CPT | Mod: 25

## 2025-01-10 ENCOUNTER — APPOINTMENT (OUTPATIENT)
Dept: ORTHOPEDIC SURGERY | Facility: CLINIC | Age: 80
End: 2025-01-10
Payer: MEDICARE

## 2025-01-10 DIAGNOSIS — S52.202A UNSPECIFIED FRACTURE OF SHAFT OF LEFT ULNA, INITIAL ENCOUNTER FOR CLOSED FRACTURE: ICD-10-CM

## 2025-01-10 PROCEDURE — 99213 OFFICE O/P EST LOW 20 MIN: CPT

## 2025-01-14 ENCOUNTER — APPOINTMENT (OUTPATIENT)
Dept: THORACIC SURGERY | Facility: CLINIC | Age: 80
End: 2025-01-14

## 2025-01-14 DIAGNOSIS — C34.91 MALIGNANT NEOPLASM OF UNSPECIFIED PART OF RIGHT BRONCHUS OR LUNG: ICD-10-CM

## 2025-01-14 DIAGNOSIS — C34.92 MALIGNANT NEOPLASM OF UNSPECIFIED PART OF LEFT BRONCHUS OR LUNG: ICD-10-CM

## 2025-01-14 PROCEDURE — 99213 OFFICE O/P EST LOW 20 MIN: CPT | Mod: 93

## 2025-01-20 NOTE — ASU PATIENT PROFILE, ADULT - ABLE TO REACH PT
Plan:  Increase Coreg (carvedilol) to 12.5 mg in the morning and evening.   You can take 2 tablets in the morning and evening of the dose you have at home until you  your new script.   Stop metoprolol succinate (Toprol XL).   Monitor your blood pressure at home, if the top number is persistently over 140, notify our office.   No further cardiac testing warranted today.   Follow up with me in 6 months. Call [phone] to schedule.    
yes

## 2025-01-24 ENCOUNTER — NON-APPOINTMENT (OUTPATIENT)
Age: 80
End: 2025-01-24

## 2025-01-29 ENCOUNTER — NON-APPOINTMENT (OUTPATIENT)
Age: 80
End: 2025-01-29

## 2025-01-29 ENCOUNTER — APPOINTMENT (OUTPATIENT)
Dept: OTOLARYNGOLOGY | Facility: CLINIC | Age: 80
End: 2025-01-29
Payer: MEDICARE

## 2025-01-29 VITALS
DIASTOLIC BLOOD PRESSURE: 62 MMHG | SYSTOLIC BLOOD PRESSURE: 129 MMHG | HEIGHT: 59 IN | HEART RATE: 73 BPM | TEMPERATURE: 98 F

## 2025-01-29 DIAGNOSIS — J32.0 CHRONIC MAXILLARY SINUSITIS: ICD-10-CM

## 2025-01-29 PROBLEM — J31.0 CHRONIC RHINITIS: Status: ACTIVE | Noted: 2025-01-29

## 2025-01-29 PROCEDURE — 31231 NASAL ENDOSCOPY DX: CPT

## 2025-01-29 PROCEDURE — 99214 OFFICE O/P EST MOD 30 MIN: CPT | Mod: 25

## 2025-01-29 RX ORDER — SODIUM CHLORIDE 2.65%
2.65 AEROSOL, SPRAY (ML) NASAL
Qty: 1 | Refills: 10 | Status: ACTIVE | COMMUNITY
Start: 2025-01-29 | End: 1900-01-01

## 2025-02-02 NOTE — H&P PST ADULT - MS GEN HX ROS MEA POS PC
"Chief Complaint   Patient presents with    Shortness of Breath    Dizziness       Initial BP 90/60   Pulse 106   Temp 98.6  F (37  C) (Tympanic)   Resp 24   Ht 1.715 m (5' 7.5\")   Wt 55.3 kg (122 lb)   SpO2 96%   BMI 18.83 kg/m   Estimated body mass index is 18.83 kg/m  as calculated from the following:    Height as of this encounter: 1.715 m (5' 7.5\").    Weight as of this encounter: 55.3 kg (122 lb).  Medication Review: complete    The next two questions are to help us understand your food security.  If you are feeling you need any assistance in this area, we have resources available to support you today.          6/7/2024   SDOH- Food Insecurity   Within the past 12 months, did you worry that your food would run out before you got money to buy more? N   Within the past 12 months, did the food you bought just not last and you didn t have money to get more? N         Health Care Directive:  Patient does not have a Health Care Directive: Discussed advance care planning with patient; however, patient declined at this time.    Norma J. Gosselin, LPN      " bilateral knees ->gel injections/joint pain

## 2025-02-05 ENCOUNTER — APPOINTMENT (OUTPATIENT)
Dept: ORTHOPEDIC SURGERY | Facility: CLINIC | Age: 80
End: 2025-02-05
Payer: MEDICARE

## 2025-02-05 ENCOUNTER — NON-APPOINTMENT (OUTPATIENT)
Age: 80
End: 2025-02-05

## 2025-02-05 DIAGNOSIS — M65.231 CALCIFIC TENDINITIS, RIGHT FOREARM: ICD-10-CM

## 2025-02-05 DIAGNOSIS — M15.1 HEBERDEN'S NODES (WITH ARTHROPATHY): ICD-10-CM

## 2025-02-05 PROCEDURE — 99213 OFFICE O/P EST LOW 20 MIN: CPT

## 2025-02-05 PROCEDURE — 73110 X-RAY EXAM OF WRIST: CPT | Mod: LT

## 2025-02-06 ENCOUNTER — APPOINTMENT (OUTPATIENT)
Dept: OTOLARYNGOLOGY | Facility: CLINIC | Age: 80
End: 2025-02-06
Payer: MEDICARE

## 2025-02-06 VITALS — BODY MASS INDEX: 24.19 KG/M2 | HEIGHT: 59 IN | WEIGHT: 120 LBS

## 2025-02-06 DIAGNOSIS — J34.2 DEVIATED NASAL SEPTUM: ICD-10-CM

## 2025-02-06 DIAGNOSIS — J31.0 CHRONIC RHINITIS: ICD-10-CM

## 2025-02-06 PROCEDURE — G2211 COMPLEX E/M VISIT ADD ON: CPT

## 2025-02-06 PROCEDURE — 99213 OFFICE O/P EST LOW 20 MIN: CPT

## 2025-02-07 ENCOUNTER — APPOINTMENT (OUTPATIENT)
Dept: ORTHOPEDIC SURGERY | Facility: HOSPITAL | Age: 80
End: 2025-02-07

## 2025-02-19 ENCOUNTER — APPOINTMENT (OUTPATIENT)
Dept: ORTHOPEDIC SURGERY | Facility: CLINIC | Age: 80
End: 2025-02-19
Payer: MEDICARE

## 2025-02-19 DIAGNOSIS — S52.202A UNSPECIFIED FRACTURE OF SHAFT OF LEFT ULNA, INITIAL ENCOUNTER FOR CLOSED FRACTURE: ICD-10-CM

## 2025-02-19 PROCEDURE — 99213 OFFICE O/P EST LOW 20 MIN: CPT

## 2025-02-19 PROCEDURE — 73110 X-RAY EXAM OF WRIST: CPT | Mod: LT

## 2025-02-25 ENCOUNTER — APPOINTMENT (OUTPATIENT)
Dept: PULMONOLOGY | Facility: CLINIC | Age: 80
End: 2025-02-25
Payer: MEDICARE

## 2025-02-25 VITALS — SYSTOLIC BLOOD PRESSURE: 113 MMHG | HEART RATE: 72 BPM | DIASTOLIC BLOOD PRESSURE: 68 MMHG | OXYGEN SATURATION: 100 %

## 2025-02-25 DIAGNOSIS — J45.909 UNSPECIFIED ASTHMA, UNCOMPLICATED: ICD-10-CM

## 2025-02-25 DIAGNOSIS — C34.92 MALIGNANT NEOPLASM OF UNSPECIFIED PART OF LEFT BRONCHUS OR LUNG: ICD-10-CM

## 2025-02-25 DIAGNOSIS — C34.91 MALIGNANT NEOPLASM OF UNSPECIFIED PART OF RIGHT BRONCHUS OR LUNG: ICD-10-CM

## 2025-02-25 PROCEDURE — 99213 OFFICE O/P EST LOW 20 MIN: CPT | Mod: 25

## 2025-02-25 PROCEDURE — 94010 BREATHING CAPACITY TEST: CPT

## 2025-03-03 ENCOUNTER — APPOINTMENT (OUTPATIENT)
Dept: CT IMAGING | Facility: IMAGING CENTER | Age: 80
End: 2025-03-03
Payer: MEDICARE

## 2025-03-03 ENCOUNTER — OUTPATIENT (OUTPATIENT)
Dept: OUTPATIENT SERVICES | Facility: HOSPITAL | Age: 80
LOS: 1 days | End: 2025-03-03
Payer: MEDICARE

## 2025-03-03 DIAGNOSIS — Z98.890 OTHER SPECIFIED POSTPROCEDURAL STATES: Chronic | ICD-10-CM

## 2025-03-03 PROCEDURE — 70486 CT MAXILLOFACIAL W/O DYE: CPT | Mod: 26

## 2025-03-03 PROCEDURE — 70486 CT MAXILLOFACIAL W/O DYE: CPT

## 2025-03-06 ENCOUNTER — NON-APPOINTMENT (OUTPATIENT)
Age: 80
End: 2025-03-06

## 2025-03-12 ENCOUNTER — APPOINTMENT (OUTPATIENT)
Dept: OTOLARYNGOLOGY | Facility: CLINIC | Age: 80
End: 2025-03-12
Payer: MEDICARE

## 2025-03-12 VITALS
BODY MASS INDEX: 23.99 KG/M2 | HEIGHT: 59 IN | WEIGHT: 119 LBS | HEART RATE: 76 BPM | OXYGEN SATURATION: 98 % | DIASTOLIC BLOOD PRESSURE: 69 MMHG | SYSTOLIC BLOOD PRESSURE: 108 MMHG

## 2025-03-12 DIAGNOSIS — J34.2 DEVIATED NASAL SEPTUM: ICD-10-CM

## 2025-03-12 DIAGNOSIS — J32.0 CHRONIC MAXILLARY SINUSITIS: ICD-10-CM

## 2025-03-12 PROCEDURE — 99213 OFFICE O/P EST LOW 20 MIN: CPT | Mod: 25

## 2025-03-12 PROCEDURE — 31231 NASAL ENDOSCOPY DX: CPT

## 2025-03-12 RX ORDER — LEVOFLOXACIN 750 MG/1
750 TABLET, FILM COATED ORAL
Qty: 7 | Refills: 0 | Status: ACTIVE | COMMUNITY
Start: 2025-03-12 | End: 1900-01-01

## 2025-04-15 ENCOUNTER — OUTPATIENT (OUTPATIENT)
Dept: OUTPATIENT SERVICES | Facility: HOSPITAL | Age: 80
LOS: 1 days | End: 2025-04-15
Payer: MEDICARE

## 2025-04-15 ENCOUNTER — APPOINTMENT (OUTPATIENT)
Dept: CT IMAGING | Facility: IMAGING CENTER | Age: 80
End: 2025-04-15
Payer: MEDICARE

## 2025-04-15 DIAGNOSIS — Z98.890 OTHER SPECIFIED POSTPROCEDURAL STATES: Chronic | ICD-10-CM

## 2025-04-15 DIAGNOSIS — C34.91 MALIGNANT NEOPLASM OF UNSPECIFIED PART OF RIGHT BRONCHUS OR LUNG: ICD-10-CM

## 2025-04-15 DIAGNOSIS — Z90.722 ACQUIRED ABSENCE OF OVARIES, BILATERAL: Chronic | ICD-10-CM

## 2025-04-15 PROCEDURE — 71250 CT THORAX DX C-: CPT

## 2025-04-15 PROCEDURE — 71250 CT THORAX DX C-: CPT | Mod: 26

## 2025-04-17 ENCOUNTER — APPOINTMENT (OUTPATIENT)
Dept: OTOLARYNGOLOGY | Facility: CLINIC | Age: 80
End: 2025-04-17
Payer: MEDICARE

## 2025-04-17 VITALS — TEMPERATURE: 98 F | SYSTOLIC BLOOD PRESSURE: 119 MMHG | DIASTOLIC BLOOD PRESSURE: 63 MMHG | HEART RATE: 77 BPM

## 2025-04-17 DIAGNOSIS — R93.0 ABNORMAL FINDINGS ON DIAGNOSTIC IMAGING OF SKULL AND HEAD, NOT ELSEWHERE CLASSIFIED: ICD-10-CM

## 2025-04-17 PROCEDURE — 31231 NASAL ENDOSCOPY DX: CPT

## 2025-04-17 PROCEDURE — 99213 OFFICE O/P EST LOW 20 MIN: CPT | Mod: 25

## 2025-04-22 ENCOUNTER — APPOINTMENT (OUTPATIENT)
Dept: THORACIC SURGERY | Facility: CLINIC | Age: 80
End: 2025-04-22
Payer: MEDICARE

## 2025-04-22 VITALS
OXYGEN SATURATION: 97 % | BODY MASS INDEX: 25.89 KG/M2 | WEIGHT: 120 LBS | DIASTOLIC BLOOD PRESSURE: 62 MMHG | SYSTOLIC BLOOD PRESSURE: 124 MMHG | RESPIRATION RATE: 16 BRPM | HEIGHT: 57 IN | HEART RATE: 79 BPM

## 2025-04-22 DIAGNOSIS — C34.92 MALIGNANT NEOPLASM OF UNSPECIFIED PART OF LEFT BRONCHUS OR LUNG: ICD-10-CM

## 2025-04-22 DIAGNOSIS — C34.91 MALIGNANT NEOPLASM OF UNSPECIFIED PART OF RIGHT BRONCHUS OR LUNG: ICD-10-CM

## 2025-04-22 PROCEDURE — 99214 OFFICE O/P EST MOD 30 MIN: CPT

## 2025-05-27 ENCOUNTER — APPOINTMENT (OUTPATIENT)
Dept: PULMONOLOGY | Facility: CLINIC | Age: 80
End: 2025-05-27
Payer: MEDICARE

## 2025-05-27 VITALS — DIASTOLIC BLOOD PRESSURE: 60 MMHG | HEART RATE: 67 BPM | SYSTOLIC BLOOD PRESSURE: 106 MMHG | OXYGEN SATURATION: 100 %

## 2025-05-27 DIAGNOSIS — C34.91 MALIGNANT NEOPLASM OF UNSPECIFIED PART OF RIGHT BRONCHUS OR LUNG: ICD-10-CM

## 2025-05-27 DIAGNOSIS — C34.92 MALIGNANT NEOPLASM OF UNSPECIFIED PART OF LEFT BRONCHUS OR LUNG: ICD-10-CM

## 2025-05-27 DIAGNOSIS — J45.909 UNSPECIFIED ASTHMA, UNCOMPLICATED: ICD-10-CM

## 2025-05-27 PROCEDURE — 94010 BREATHING CAPACITY TEST: CPT

## 2025-05-27 PROCEDURE — 99213 OFFICE O/P EST LOW 20 MIN: CPT | Mod: 25

## 2025-06-10 ENCOUNTER — APPOINTMENT (OUTPATIENT)
Dept: OTOLARYNGOLOGY | Facility: CLINIC | Age: 80
End: 2025-06-10
Payer: MEDICARE

## 2025-06-10 PROCEDURE — 31231 NASAL ENDOSCOPY DX: CPT

## 2025-06-10 PROCEDURE — 99213 OFFICE O/P EST LOW 20 MIN: CPT | Mod: 25

## 2025-06-11 ENCOUNTER — APPOINTMENT (OUTPATIENT)
Dept: ORTHOPEDIC SURGERY | Facility: CLINIC | Age: 80
End: 2025-06-11
Payer: MEDICARE

## 2025-06-11 VITALS — BODY MASS INDEX: 23.99 KG/M2 | WEIGHT: 119 LBS | HEIGHT: 59 IN

## 2025-06-11 PROCEDURE — 20610 DRAIN/INJ JOINT/BURSA W/O US: CPT | Mod: 50

## 2025-06-11 PROCEDURE — 73564 X-RAY EXAM KNEE 4 OR MORE: CPT | Mod: 50

## 2025-06-11 PROCEDURE — 99214 OFFICE O/P EST MOD 30 MIN: CPT | Mod: 25

## 2025-06-18 ENCOUNTER — APPOINTMENT (OUTPATIENT)
Dept: ORTHOPEDIC SURGERY | Facility: CLINIC | Age: 80
End: 2025-06-18

## 2025-06-18 PROCEDURE — 20610 DRAIN/INJ JOINT/BURSA W/O US: CPT | Mod: 50

## 2025-06-19 ENCOUNTER — APPOINTMENT (OUTPATIENT)
Dept: ULTRASOUND IMAGING | Facility: IMAGING CENTER | Age: 80
End: 2025-06-19
Payer: MEDICARE

## 2025-06-19 ENCOUNTER — OUTPATIENT (OUTPATIENT)
Dept: OUTPATIENT SERVICES | Facility: HOSPITAL | Age: 80
LOS: 1 days | End: 2025-06-19
Payer: MEDICARE

## 2025-06-19 DIAGNOSIS — Z00.8 ENCOUNTER FOR OTHER GENERAL EXAMINATION: ICD-10-CM

## 2025-06-19 DIAGNOSIS — Z98.890 OTHER SPECIFIED POSTPROCEDURAL STATES: Chronic | ICD-10-CM

## 2025-06-19 DIAGNOSIS — Z90.722 ACQUIRED ABSENCE OF OVARIES, BILATERAL: Chronic | ICD-10-CM

## 2025-06-19 PROCEDURE — 76700 US EXAM ABDOM COMPLETE: CPT | Mod: 26

## 2025-06-19 PROCEDURE — 76700 US EXAM ABDOM COMPLETE: CPT

## 2025-06-25 ENCOUNTER — APPOINTMENT (OUTPATIENT)
Dept: ORTHOPEDIC SURGERY | Facility: CLINIC | Age: 80
End: 2025-06-25
Payer: MEDICARE

## 2025-06-25 PROCEDURE — 20610 DRAIN/INJ JOINT/BURSA W/O US: CPT | Mod: 50

## 2025-07-02 ENCOUNTER — APPOINTMENT (OUTPATIENT)
Dept: ORTHOPEDIC SURGERY | Facility: CLINIC | Age: 80
End: 2025-07-02
Payer: MEDICARE

## 2025-07-02 PROCEDURE — 20610 DRAIN/INJ JOINT/BURSA W/O US: CPT | Mod: 50

## 2025-07-17 ENCOUNTER — APPOINTMENT (OUTPATIENT)
Dept: OTOLARYNGOLOGY | Facility: CLINIC | Age: 80
End: 2025-07-17
Payer: MEDICARE

## 2025-07-17 PROCEDURE — 31295Z: CUSTOM | Mod: LT

## 2025-07-17 PROCEDURE — 61782 SCAN PROC CRANIAL EXTRA: CPT

## 2025-07-17 RX ORDER — AZITHROMYCIN 250 MG/1
250 TABLET, FILM COATED ORAL
Qty: 1 | Refills: 0 | Status: ACTIVE | COMMUNITY
Start: 2025-07-17 | End: 1900-01-01

## 2025-07-21 NOTE — ASU PATIENT PROFILE, ADULT - MEDICATION HERBAL REMEDIES, PROFILE
Problem: Safety - Medical Restraint  Goal: Remains free of injury from restraints (Restraint for Interference with Medical Device)  Description: INTERVENTIONS:  1. Determine that other, less restrictive measures have been tried or would not be effective before applying the restraint  2. Evaluate the patient's condition at the time of restraint application  3. Inform patient/family regarding the reason for restraint  4. Q2H: Monitor safety, psychosocial status, comfort, nutrition and hydration  7/21/2025 0126 by Vane Montanez RN  Outcome: Progressing  Flowsheets (Taken 7/21/2025 0126)  Remains free of injury from restraints (restraint for interference with medical device):   Determine that other, less restrictive measures have been tried or would not be effective before applying the restraint   Evaluate the patient's condition at the time of restraint application   Every 2 hours: Monitor safety, psychosocial status, comfort, nutrition and hydration   Inform patient/family rega  Problem: Safety - Adult  Goal: Free from fall injury  7/21/2025 0126 by Vane Montanez RN  Outcome: Progressing  Flowsheets (Taken 7/21/2025 0126)  Free From Fall Injury: Instruct family/caregiver on patient safety     Problem: Chronic Conditions and Co-morbidities  Goal: Patient's chronic conditions and co-morbidity symptoms are monitored and maintained or improved  7/21/2025 0126 by Vane Montanez RN  Outcome: Progressing  Flowsheets (Taken 7/21/2025 0126)  Care Plan - Patient's Chronic Conditions and Co-Morbidity Symptoms are Monitored and Maintained or Improved:   Monitor and assess patient's chronic conditions and comorbid symptoms for stability, deterioration, or improvement   Collaborate with multidisciplinary team to address chronic and comorbid conditions and prevent exacerbation or deterioration   Update acute care plan with appropriate goals if chronic or comorbid symptoms are exacerbated and prevent overall  no

## 2025-07-22 ENCOUNTER — APPOINTMENT (OUTPATIENT)
Dept: THORACIC SURGERY | Facility: CLINIC | Age: 80
End: 2025-07-22
Payer: MEDICARE

## 2025-07-22 ENCOUNTER — APPOINTMENT (OUTPATIENT)
Dept: RADIOLOGY | Facility: HOSPITAL | Age: 80
End: 2025-07-22

## 2025-07-22 ENCOUNTER — OUTPATIENT (OUTPATIENT)
Dept: OUTPATIENT SERVICES | Facility: HOSPITAL | Age: 80
LOS: 1 days | End: 2025-07-22
Payer: MEDICARE

## 2025-07-22 VITALS
OXYGEN SATURATION: 99 % | HEART RATE: 73 BPM | RESPIRATION RATE: 17 BRPM | SYSTOLIC BLOOD PRESSURE: 119 MMHG | BODY MASS INDEX: 23.79 KG/M2 | DIASTOLIC BLOOD PRESSURE: 68 MMHG | WEIGHT: 118 LBS | HEIGHT: 59 IN

## 2025-07-22 DIAGNOSIS — C34.91 MALIGNANT NEOPLASM OF UNSPECIFIED PART OF RIGHT BRONCHUS OR LUNG: ICD-10-CM

## 2025-07-22 DIAGNOSIS — C34.92 MALIGNANT NEOPLASM OF UNSPECIFIED PART OF LEFT BRONCHUS OR LUNG: ICD-10-CM

## 2025-07-22 DIAGNOSIS — Z98.890 OTHER SPECIFIED POSTPROCEDURAL STATES: Chronic | ICD-10-CM

## 2025-07-22 PROCEDURE — 71048 X-RAY EXAM CHEST 4+ VIEWS: CPT | Mod: 26

## 2025-07-22 PROCEDURE — 99213 OFFICE O/P EST LOW 20 MIN: CPT

## 2025-07-31 ENCOUNTER — APPOINTMENT (OUTPATIENT)
Dept: OTOLARYNGOLOGY | Facility: CLINIC | Age: 80
End: 2025-07-31
Payer: MEDICARE

## 2025-07-31 VITALS — TEMPERATURE: 98 F | DIASTOLIC BLOOD PRESSURE: 59 MMHG | SYSTOLIC BLOOD PRESSURE: 102 MMHG | HEART RATE: 78 BPM

## 2025-07-31 DIAGNOSIS — J32.0 CHRONIC MAXILLARY SINUSITIS: ICD-10-CM

## 2025-07-31 DIAGNOSIS — H61.92 DISORDER OF LEFT EXTERNAL EAR, UNSPECIFIED: ICD-10-CM

## 2025-07-31 PROCEDURE — 31231 NASAL ENDOSCOPY DX: CPT

## 2025-07-31 PROCEDURE — 99213 OFFICE O/P EST LOW 20 MIN: CPT | Mod: 25

## (undated) DEVICE — DENTURE CUP PINK

## (undated) DEVICE — BIOPSY FORCEP COLD DISP

## (undated) DEVICE — CONTAINER FORMALIN 80ML YELLOW

## (undated) DEVICE — SYR LUER LOK 10CC

## (undated) DEVICE — DRSG 2X2

## (undated) DEVICE — DRSG TELFA 3 X 8

## (undated) DEVICE — BITE BLOCK ADULT 20 X 27MM (GREEN)

## (undated) DEVICE — SALIVA EJECTOR (BLUE)

## (undated) DEVICE — SUT MONOCRYL 4-0 27" PS-2 UNDYED

## (undated) DEVICE — SPECIMEN CONTAINER 100ML

## (undated) DEVICE — ELCTR GROUNDING PAD ADULT COVIDIEN

## (undated) DEVICE — STAPLER ECHELON FLEX POWERED ADV PLCMT TIP

## (undated) DEVICE — STAPLER ETHICON ECHELON 3000 STANDARD 45MM X 340MM

## (undated) DEVICE — TUBING MEDI-VAC W MAXIGRIP CONNECTORS 1/4"X6'

## (undated) DEVICE — BASIN EMESIS 10IN GRADUATED MAUVE

## (undated) DEVICE — TRAP SPECIMEN SPUTUM 40CC

## (undated) DEVICE — DURABLE MEDICAL EQUIPMENT: Type: DURABLE MEDICAL EQUIPMENT

## (undated) DEVICE — ELCTR EXTENSION STRAIGHT

## (undated) DEVICE — SUT VICRYL 0 27" CT-1 UNDYED

## (undated) DEVICE — CONNECTOR REDUCING STRAIGHT 3/8X0.25"

## (undated) DEVICE — TUBING SUCTION NONCONDUCTIVE 6MM X 12FT

## (undated) DEVICE — STAPLER ETHICON ECHELON FLEX 45MM X 340MM

## (undated) DEVICE — ELCTR ECG CONDUCTIVE ADHESIVE

## (undated) DEVICE — CLAMP BX HOT RAD JAW 3

## (undated) DEVICE — FOLEY TRAY 16FR 5CC LF UMETER CLOSED

## (undated) DEVICE — SUT VICRYL 2-0 27" UR-6

## (undated) DEVICE — SUT SILK 0 18" TIES

## (undated) DEVICE — DRSG TEGADERM 4X4.75"

## (undated) DEVICE — NDL HYPO REGULAR BEVEL 25G X 1.5" (BLUE)

## (undated) DEVICE — PACK MAJOR ABDOMINAL W ENDO DRAPE

## (undated) DEVICE — DISSECTOR ENDOSCOPIC KITTNER SINGLE TIP

## (undated) DEVICE — DRSG STERISTRIPS 0.5 X 4"

## (undated) DEVICE — CHEST DRAIN OASIS DRY SUCTION WATER SEAL

## (undated) DEVICE — PROTECTOR HEEL / ELBOW FLUFFY

## (undated) DEVICE — ADAPTER FIBEROPTIC BRONCHOSCOPE DUAL AXIS SWIVEL

## (undated) DEVICE — POSITIONER STRAP ARMBOARD VELCRO TS-30

## (undated) DEVICE — SOL ANTI FOG

## (undated) DEVICE — ELCTR BOVIE TIP BLADE INSULATED 2.75" EDGE

## (undated) DEVICE — SYR SLIP 10CC

## (undated) DEVICE — SUT VICRYL 3-0 27" SH UNDYED

## (undated) DEVICE — DRSG BENZOIN 0.6CC

## (undated) DEVICE — PACK IV START WITH CHG

## (undated) DEVICE — VALVE BIOPSY BRONCHOVIDEOSCOPE

## (undated) DEVICE — LINE BREATHE SAMPLNG

## (undated) DEVICE — DRSG CURITY GAUZE SPONGE 4 X 4" 12-PLY

## (undated) DEVICE — VENODYNE/SCD SLEEVE CALF MEDIUM

## (undated) DEVICE — WARMING BLANKET LOWER ADULT

## (undated) DEVICE — SUT SILK 2-0 30" TIES

## (undated) DEVICE — DRAPE MAYO STAND 23"

## (undated) DEVICE — DRAPE IOBAN 33" X 23"

## (undated) DEVICE — ENDOCATCH GENERAL 15MM (PURPLE)

## (undated) DEVICE — DRAPE 3/4 SHEET 52X76"

## (undated) DEVICE — PREP CHLORAPREP HI-LITE ORANGE 26ML

## (undated) DEVICE — DRSG CURITY GAUZE SPONGE 4 X 4" 12-PLY NON-STERILE

## (undated) DEVICE — LUBRICATING JELLY HR ONE SHOT 3G

## (undated) DEVICE — ELCTR BOVIE TIP BLADE INSULATED 6.5" EDGE

## (undated) DEVICE — BIOPSY FORCEP RADIAL JAW 4 STANDARD WITH NEEDLE

## (undated) DEVICE — DRSG BANDAID 0.75X3"

## (undated) DEVICE — GOWN LG

## (undated) DEVICE — FACESHIELD FULL VISOR

## (undated) DEVICE — TUBING IV SET GRAVITY 3Y 100" MACRO

## (undated) DEVICE — VALVE SUCTION EVIS 160/200/240

## (undated) DEVICE — TAPE SILK 3"

## (undated) DEVICE — DRAPE MAGNETIC INSTRUMENT MEDIUM

## (undated) DEVICE — VISITEC 4X4

## (undated) DEVICE — SOL IRR POUR NS 0.9% 500ML

## (undated) DEVICE — UNDERPAD LINEN SAVER 17 X 24"

## (undated) DEVICE — SUT PROLENE 0 30" CT-1

## (undated) DEVICE — ATTACH DISTAL DISP

## (undated) DEVICE — CHEST DRAIN PLEUR-EVAC DRY/WET ADULT-PEDS SINGLE (QUICK)

## (undated) DEVICE — CARTRIDGE C2 CRYOBALLOON LG

## (undated) DEVICE — CONTAINER FORMALIN 10% 20ML

## (undated) DEVICE — CATH IV SAFE BC 22G X 1" (BLUE)

## (undated) DEVICE — APPLICATOR FOR PROGEL EXTENDED SPRAY TIP 29CM

## (undated) DEVICE — ENDOCATCH GENERAL 10MM (PURPLE)

## (undated) DEVICE — STAPLER ECHELON FLEX POWERED PLUS 340MM